# Patient Record
Sex: MALE | Race: WHITE | Employment: OTHER | URBAN - METROPOLITAN AREA
[De-identification: names, ages, dates, MRNs, and addresses within clinical notes are randomized per-mention and may not be internally consistent; named-entity substitution may affect disease eponyms.]

---

## 2021-08-01 ENCOUNTER — HOSPITAL ENCOUNTER (INPATIENT)
Age: 86
LOS: 9 days | Discharge: NURSING, REHAB OR LONG-TERM CARE | DRG: 056 | End: 2021-08-10
Attending: EMERGENCY MEDICINE | Admitting: INTERNAL MEDICINE
Payer: MEDICARE

## 2021-08-01 ENCOUNTER — APPOINTMENT (OUTPATIENT)
Dept: GENERAL RADIOLOGY | Age: 86
DRG: 056 | End: 2021-08-01
Attending: INTERNAL MEDICINE
Payer: MEDICARE

## 2021-08-01 ENCOUNTER — APPOINTMENT (OUTPATIENT)
Dept: GENERAL RADIOLOGY | Age: 86
DRG: 056 | End: 2021-08-01
Attending: EMERGENCY MEDICINE
Payer: MEDICARE

## 2021-08-01 ENCOUNTER — APPOINTMENT (OUTPATIENT)
Dept: CT IMAGING | Age: 86
DRG: 056 | End: 2021-08-01
Attending: EMERGENCY MEDICINE
Payer: MEDICARE

## 2021-08-01 DIAGNOSIS — J96.01 ACUTE RESPIRATORY FAILURE WITH HYPOXIA (HCC): Primary | ICD-10-CM

## 2021-08-01 DIAGNOSIS — R05.9 COUGH: ICD-10-CM

## 2021-08-01 DIAGNOSIS — R63.0 ANOREXIA: ICD-10-CM

## 2021-08-01 DIAGNOSIS — R25.1 TREMOR OF FACE AND HANDS: ICD-10-CM

## 2021-08-01 DIAGNOSIS — G21.0 NMS (NEUROLEPTIC MALIGNANT SYNDROME): ICD-10-CM

## 2021-08-01 DIAGNOSIS — R40.1 STUPOR: ICD-10-CM

## 2021-08-01 DIAGNOSIS — R41.82 ALTERED MENTAL STATUS, UNSPECIFIED ALTERED MENTAL STATUS TYPE: ICD-10-CM

## 2021-08-01 DIAGNOSIS — G20 ATYPICAL PARKINSONISM (HCC): ICD-10-CM

## 2021-08-01 PROBLEM — J96.91 RESPIRATORY FAILURE WITH HYPOXIA (HCC): Status: ACTIVE | Noted: 2021-08-01

## 2021-08-01 PROBLEM — G25.0 BENIGN ESSENTIAL TREMOR: Status: ACTIVE | Noted: 2018-01-30

## 2021-08-01 PROBLEM — Z86.73 HISTORY OF TIA (TRANSIENT ISCHEMIC ATTACK): Status: ACTIVE | Noted: 2017-08-28

## 2021-08-01 LAB
ALBUMIN SERPL-MCNC: 3.3 G/DL (ref 3.2–4.6)
ALBUMIN/GLOB SERPL: 1 {RATIO} (ref 1.2–3.5)
ALP SERPL-CCNC: 73 U/L (ref 50–136)
ALT SERPL-CCNC: 15 U/L (ref 12–65)
ANION GAP SERPL CALC-SCNC: 6 MMOL/L (ref 7–16)
ANION GAP SERPL CALC-SCNC: 7 MMOL/L (ref 7–16)
APPEARANCE UR: CLEAR
ARTERIAL PATENCY WRIST A: POSITIVE
ARTERIAL PATENCY WRIST A: POSITIVE
AST SERPL-CCNC: 83 U/L (ref 15–37)
BASE EXCESS BLD CALC-SCNC: 0.7 MMOL/L
BASE EXCESS BLD CALC-SCNC: 0.9 MMOL/L
BASOPHILS # BLD: 0 K/UL (ref 0–0.2)
BASOPHILS NFR BLD: 0 % (ref 0–2)
BDY SITE: ABNORMAL
BDY SITE: NORMAL
BILIRUB SERPL-MCNC: 0.9 MG/DL (ref 0.2–1.1)
BILIRUB UR QL: ABNORMAL
BUN SERPL-MCNC: 22 MG/DL (ref 8–23)
BUN SERPL-MCNC: 24 MG/DL (ref 8–23)
CALCIUM SERPL-MCNC: 8.5 MG/DL (ref 8.3–10.4)
CALCIUM SERPL-MCNC: 8.6 MG/DL (ref 8.3–10.4)
CHLORIDE SERPL-SCNC: 103 MMOL/L (ref 98–107)
CHLORIDE SERPL-SCNC: 105 MMOL/L (ref 98–107)
CO2 BLD-SCNC: 26 MMOL/L (ref 13–23)
CO2 SERPL-SCNC: 28 MMOL/L (ref 21–32)
CO2 SERPL-SCNC: 29 MMOL/L (ref 21–32)
COLOR UR: ABNORMAL
COVID-19 RAPID TEST, COVR: NOT DETECTED
CREAT SERPL-MCNC: 0.83 MG/DL (ref 0.8–1.5)
CREAT SERPL-MCNC: 0.9 MG/DL (ref 0.8–1.5)
DIFFERENTIAL METHOD BLD: ABNORMAL
EOSINOPHIL # BLD: 0 K/UL (ref 0–0.8)
EOSINOPHIL NFR BLD: 0 % (ref 0.5–7.8)
ERYTHROCYTE [DISTWIDTH] IN BLOOD BY AUTOMATED COUNT: 13.2 % (ref 11.9–14.6)
GAS FLOW.O2 O2 DELIVERY SYS: ABNORMAL L/MIN
GAS FLOW.O2 O2 DELIVERY SYS: NORMAL L/MIN
GLOBULIN SER CALC-MCNC: 3.4 G/DL (ref 2.3–3.5)
GLUCOSE SERPL-MCNC: 122 MG/DL (ref 65–100)
GLUCOSE SERPL-MCNC: 138 MG/DL (ref 65–100)
GLUCOSE UR STRIP.AUTO-MCNC: NEGATIVE MG/DL
HCO3 BLD-SCNC: 25.6 MMOL/L (ref 22–26)
HCO3 BLD-SCNC: 25.8 MMOL/L (ref 22–26)
HCT VFR BLD AUTO: 32.3 % (ref 41.1–50.3)
HGB BLD-MCNC: 11 G/DL (ref 13.6–17.2)
HGB UR QL STRIP: NEGATIVE
IMM GRANULOCYTES # BLD AUTO: 0 K/UL (ref 0–0.5)
IMM GRANULOCYTES NFR BLD AUTO: 1 % (ref 0–5)
INSPIRATION.DURATION SETTING TIME VENT: 0.9 SEC
KETONES UR QL STRIP.AUTO: 15 MG/DL
LACTATE SERPL-SCNC: 1.6 MMOL/L (ref 0.4–2)
LACTATE SERPL-SCNC: 1.6 MMOL/L (ref 0.4–2)
LEUKOCYTE ESTERASE UR QL STRIP.AUTO: NEGATIVE
LYMPHOCYTES # BLD: 0.6 K/UL (ref 0.5–4.6)
LYMPHOCYTES NFR BLD: 9 % (ref 13–44)
MAGNESIUM SERPL-MCNC: 2 MG/DL (ref 1.8–2.4)
MCH RBC QN AUTO: 32.4 PG (ref 26.1–32.9)
MCHC RBC AUTO-ENTMCNC: 34.1 G/DL (ref 31.4–35)
MCV RBC AUTO: 95.3 FL (ref 79.6–97.8)
MONOCYTES # BLD: 0.4 K/UL (ref 0.1–1.3)
MONOCYTES NFR BLD: 6 % (ref 4–12)
NEUTS SEG # BLD: 5.9 K/UL (ref 1.7–8.2)
NEUTS SEG NFR BLD: 85 % (ref 43–78)
NITRITE UR QL STRIP.AUTO: NEGATIVE
NRBC # BLD: 0 K/UL (ref 0–0.2)
O2/TOTAL GAS SETTING VFR VENT: 35 %
O2/TOTAL GAS SETTING VFR VENT: 60 %
PAW @ MEAN EXP FLOW ON VENT: 11 CMH2O
PCO2 BLD: 40.5 MMHG (ref 35–45)
PCO2 BLD: 42.5 MMHG (ref 35–45)
PEEP RESPIRATORY: 8 CMH2O
PEEP RESPIRATORY: 8 CMH2O
PH BLD: 7.39 [PH] (ref 7.35–7.45)
PH BLD: 7.41 [PH] (ref 7.35–7.45)
PH UR STRIP: 6.5 [PH] (ref 5–9)
PIP ISTAT,IPIP: 21
PLATELET # BLD AUTO: 219 K/UL (ref 150–450)
PMV BLD AUTO: 8.9 FL (ref 9.4–12.3)
PO2 BLD: 323 MMHG (ref 75–100)
PO2 BLD: 95 MMHG (ref 75–100)
POTASSIUM SERPL-SCNC: 3.8 MMOL/L (ref 3.5–5.1)
POTASSIUM SERPL-SCNC: 4.3 MMOL/L (ref 3.5–5.1)
PROCALCITONIN SERPL-MCNC: 0.06 NG/ML
PROT SERPL-MCNC: 6.7 G/DL (ref 6.3–8.2)
PROT UR STRIP-MCNC: NEGATIVE MG/DL
RBC # BLD AUTO: 3.39 M/UL (ref 4.23–5.6)
SAO2 % BLD: 97.5 % (ref 95–98)
SAO2 % BLD: 99.9 % (ref 95–98)
SARS-COV-2, COV2: NORMAL
SERVICE CMNT-IMP: ABNORMAL
SERVICE CMNT-IMP: ABNORMAL
SERVICE CMNT-IMP: NORMAL
SODIUM SERPL-SCNC: 139 MMOL/L (ref 138–145)
SODIUM SERPL-SCNC: 139 MMOL/L (ref 138–145)
SOURCE, COVRS: NORMAL
SP GR UR REFRACTOMETRY: 1.02 (ref 1–1.02)
SPECIMEN TYPE: ABNORMAL
SPECIMEN TYPE: NORMAL
TOTAL RESP. RATE, ITRR: 18
TROPONIN-HIGH SENSITIVITY: 39.2 PG/ML (ref 0–14)
TROPONIN-HIGH SENSITIVITY: 42.4 PG/ML (ref 0–14)
UROBILINOGEN UR QL STRIP.AUTO: 1 EU/DL (ref 0.2–1)
VENTILATION MODE VENT: ABNORMAL
VENTILATION MODE VENT: NORMAL
VT SETTING VENT: 450 ML
VT SETTING VENT: 450 ML
WBC # BLD AUTO: 7 K/UL (ref 4.3–11.1)

## 2021-08-01 PROCEDURE — 36600 WITHDRAWAL OF ARTERIAL BLOOD: CPT

## 2021-08-01 PROCEDURE — 83735 ASSAY OF MAGNESIUM: CPT

## 2021-08-01 PROCEDURE — 74011250636 HC RX REV CODE- 250/636: Performed by: NURSE PRACTITIONER

## 2021-08-01 PROCEDURE — 82803 BLOOD GASES ANY COMBINATION: CPT

## 2021-08-01 PROCEDURE — 74011250636 HC RX REV CODE- 250/636: Performed by: EMERGENCY MEDICINE

## 2021-08-01 PROCEDURE — 80053 COMPREHEN METABOLIC PANEL: CPT

## 2021-08-01 PROCEDURE — 84484 ASSAY OF TROPONIN QUANT: CPT

## 2021-08-01 PROCEDURE — 5A1945Z RESPIRATORY VENTILATION, 24-96 CONSECUTIVE HOURS: ICD-10-PCS | Performed by: INTERNAL MEDICINE

## 2021-08-01 PROCEDURE — 65610000001 HC ROOM ICU GENERAL

## 2021-08-01 PROCEDURE — 71045 X-RAY EXAM CHEST 1 VIEW: CPT

## 2021-08-01 PROCEDURE — 74011250636 HC RX REV CODE- 250/636: Performed by: INTERNAL MEDICINE

## 2021-08-01 PROCEDURE — 0DH67UZ INSERTION OF FEEDING DEVICE INTO STOMACH, VIA NATURAL OR ARTIFICIAL OPENING: ICD-10-PCS | Performed by: INTERNAL MEDICINE

## 2021-08-01 PROCEDURE — 51702 INSERT TEMP BLADDER CATH: CPT

## 2021-08-01 PROCEDURE — 83605 ASSAY OF LACTIC ACID: CPT

## 2021-08-01 PROCEDURE — 84145 PROCALCITONIN (PCT): CPT

## 2021-08-01 PROCEDURE — 87040 BLOOD CULTURE FOR BACTERIA: CPT

## 2021-08-01 PROCEDURE — 87635 SARS-COV-2 COVID-19 AMP PRB: CPT

## 2021-08-01 PROCEDURE — 74011000258 HC RX REV CODE- 258: Performed by: INTERNAL MEDICINE

## 2021-08-01 PROCEDURE — 94002 VENT MGMT INPAT INIT DAY: CPT

## 2021-08-01 PROCEDURE — 74018 RADEX ABDOMEN 1 VIEW: CPT

## 2021-08-01 PROCEDURE — 74011000250 HC RX REV CODE- 250: Performed by: NURSE PRACTITIONER

## 2021-08-01 PROCEDURE — 96374 THER/PROPH/DIAG INJ IV PUSH: CPT

## 2021-08-01 PROCEDURE — 99223 1ST HOSP IP/OBS HIGH 75: CPT | Performed by: INTERNAL MEDICINE

## 2021-08-01 PROCEDURE — 85025 COMPLETE CBC W/AUTO DIFF WBC: CPT

## 2021-08-01 PROCEDURE — 99285 EMERGENCY DEPT VISIT HI MDM: CPT

## 2021-08-01 PROCEDURE — 36415 COLL VENOUS BLD VENIPUNCTURE: CPT

## 2021-08-01 PROCEDURE — 70450 CT HEAD/BRAIN W/O DYE: CPT

## 2021-08-01 PROCEDURE — 81003 URINALYSIS AUTO W/O SCOPE: CPT

## 2021-08-01 PROCEDURE — 3E0G76Z INTRODUCTION OF NUTRITIONAL SUBSTANCE INTO UPPER GI, VIA NATURAL OR ARTIFICIAL OPENING: ICD-10-PCS | Performed by: INTERNAL MEDICINE

## 2021-08-01 RX ORDER — SODIUM CHLORIDE 0.9 % (FLUSH) 0.9 %
5-40 SYRINGE (ML) INJECTION AS NEEDED
Status: DISCONTINUED | OUTPATIENT
Start: 2021-08-01 | End: 2021-08-10 | Stop reason: HOSPADM

## 2021-08-01 RX ORDER — ACETAMINOPHEN 325 MG/1
650 TABLET ORAL
Status: DISCONTINUED | OUTPATIENT
Start: 2021-08-01 | End: 2021-08-10 | Stop reason: HOSPADM

## 2021-08-01 RX ORDER — ONDANSETRON 2 MG/ML
4 INJECTION INTRAMUSCULAR; INTRAVENOUS
Status: DISCONTINUED | OUTPATIENT
Start: 2021-08-01 | End: 2021-08-10 | Stop reason: HOSPADM

## 2021-08-01 RX ORDER — SODIUM CHLORIDE 0.9 % (FLUSH) 0.9 %
5-40 SYRINGE (ML) INJECTION EVERY 8 HOURS
Status: DISCONTINUED | OUTPATIENT
Start: 2021-08-01 | End: 2021-08-10 | Stop reason: HOSPADM

## 2021-08-01 RX ORDER — ACETAMINOPHEN 650 MG/1
650 SUPPOSITORY RECTAL
Status: DISCONTINUED | OUTPATIENT
Start: 2021-08-01 | End: 2021-08-10 | Stop reason: HOSPADM

## 2021-08-01 RX ORDER — ONDANSETRON 4 MG/1
4 TABLET, ORALLY DISINTEGRATING ORAL
Status: DISCONTINUED | OUTPATIENT
Start: 2021-08-01 | End: 2021-08-10 | Stop reason: HOSPADM

## 2021-08-01 RX ORDER — POLYETHYLENE GLYCOL 3350 17 G/17G
17 POWDER, FOR SOLUTION ORAL DAILY PRN
Status: DISCONTINUED | OUTPATIENT
Start: 2021-08-01 | End: 2021-08-10 | Stop reason: HOSPADM

## 2021-08-01 RX ORDER — PROPOFOL 10 MG/ML
0-50 INJECTION, EMULSION INTRAVENOUS
Status: DISCONTINUED | OUTPATIENT
Start: 2021-08-01 | End: 2021-08-02

## 2021-08-01 RX ORDER — ENOXAPARIN SODIUM 100 MG/ML
40 INJECTION SUBCUTANEOUS DAILY
Status: DISCONTINUED | OUTPATIENT
Start: 2021-08-02 | End: 2021-08-10 | Stop reason: HOSPADM

## 2021-08-01 RX ORDER — FENTANYL CITRATE-0.9 % NACL/PF 25 MCG/ML
0-200 PLASTIC BAG, INJECTION (ML) INJECTION
Status: DISCONTINUED | OUTPATIENT
Start: 2021-08-01 | End: 2021-08-02

## 2021-08-01 RX ADMIN — PROPOFOL 5 MCG/KG/MIN: 10 INJECTION, EMULSION INTRAVENOUS at 11:21

## 2021-08-01 RX ADMIN — CEFTRIAXONE 1 G: 1 INJECTION, POWDER, FOR SOLUTION INTRAMUSCULAR; INTRAVENOUS at 15:20

## 2021-08-01 RX ADMIN — Medication 10 ML: at 21:08

## 2021-08-01 RX ADMIN — Medication 10 ML: at 16:02

## 2021-08-01 RX ADMIN — AZITHROMYCIN MONOHYDRATE 500 MG: 500 INJECTION, POWDER, LYOPHILIZED, FOR SOLUTION INTRAVENOUS at 16:01

## 2021-08-01 RX ADMIN — PROPOFOL 10 MCG/KG/MIN: 10 INJECTION, EMULSION INTRAVENOUS at 12:38

## 2021-08-01 RX ADMIN — FAMOTIDINE 20 MG: 10 INJECTION INTRAVENOUS at 15:25

## 2021-08-01 RX ADMIN — FENTANYL CITRATE 75 MCG/HR: 0.05 INJECTION, SOLUTION INTRAMUSCULAR; INTRAVENOUS at 13:56

## 2021-08-01 RX ADMIN — FENTANYL CITRATE 50 MCG/HR: 0.05 INJECTION, SOLUTION INTRAMUSCULAR; INTRAVENOUS at 13:29

## 2021-08-01 RX ADMIN — FAMOTIDINE 20 MG: 10 INJECTION INTRAVENOUS at 21:08

## 2021-08-01 NOTE — PROGRESS NOTES
TRANSFER - IN REPORT:    Verbal report received from Jennifer RN (name) on Ricky Echeverria  being received from ED BED 7 (unit) for routine progression of care      Report consisted of patients Situation, Background, Assessment and   Recommendations(SBAR). Information from the following report(s) SBAR, Kardex, ED Summary, Intake/Output, MAR, Recent Results, Med Rec Status, Cardiac Rhythm NSR, Alarm Parameters  and Quality Measures was reviewed with the receiving nurse. Opportunity for questions and clarification was provided. Assessment completed upon patients arrival to unit and care assumed.

## 2021-08-01 NOTE — ED PROVIDER NOTES
Patient is an 71-year-old male who is currently at [facility] with Parkinson's dementia. He was admitted to the facility on 7/27/2021. I spoke with the physician at the facility this morning who saw him yesterday and said he was not conversational but got a call from the staff that he was increasingly combative and agitated this morning. He did not get to see the patient prior to speaking with me. According to EMS when they arrived the patient was having some gurgling respirations and decreased oxygen saturation. This continued to decline and they intubated the patient in route.            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    Murmur 1/18/2013    Nonspecific (abnormal) findings on radiological and other examination of other intrathoracic organs 9/4/2012    Osteopenia 7/11/2013    Peptic ulcer, unspecified site, unspecified as acute or chronic, without mention of hemorrhage, perforation, or obstruction 9/4/2012    Pulmonary infiltrate 1/18/2013    Tremor 7/11/2013    Vertigo 7/11/2013    Vitamin D deficiency 7/11/2013    Weight loss 1/18/2013       Past Surgical History:   Procedure Laterality Date    HX BACK SURGERY  1990    herniated disc    HX CHOLECYSTECTOMY  2000    HX GASTRECTOMY  1960    due to DU    HX HEENT  1994    sinus    HX HERNIA REPAIR  1992    right inguinal    HX ORTHOPAEDIC  rt shoulder    1999         Family History:   Problem Relation Age of Onset    Arthritis-osteo Mother     Heart Disease Mother     Alzheimer Mother     Heart Disease Father     Herminia Gentle Brother     Other Other         General Fam Hx. of aneurysms    Heart Disease Sister         Valve replacement       Social History     Socioeconomic History    Marital status:      Spouse name: Not on file    Number of children: Not on file    Years of education: Not on file    Highest education level: Not on file   Occupational History    Not on file   Tobacco Use    Smoking status: Never Smoker    Smokeless tobacco: Never Used   Substance and Sexual Activity    Alcohol use: No     Alcohol/week: 0.0 standard drinks    Drug use: No    Sexual activity: Not on file   Other Topics Concern    Not on file   Social History Narrative    Not on file     Social Determinants of Health     Financial Resource Strain:     Difficulty of Paying Living Expenses:    Food Insecurity:     Worried About Running Out of Food in the Last Year:     [address] in the Last Year:    Transportation Needs:     Lack of Transportation (Medical):  Lack of Transportation (Non-Medical):    Physical Activity:     Days of Exercise per Week:     Minutes of Exercise per Session:    Stress:     Feeling of Stress :    Social Connections:     Frequency of Communication with Friends and Family:     Frequency of Social Gatherings with Friends and Family:     Attends Sabianism Services:     Active Member of Clubs or Organizations:     Attends Club or Organization Meetings:     Marital Status:    Intimate Partner Violence:     Fear of Current or Ex-Partner:     Emotionally Abused:     Physically Abused:     Sexually Abused:           ALLERGIES: Codeine and Gadolinium-containing contrast media    Review of Systems   Unable to perform ROS: Intubated       Vitals:    08/01/21 1421 08/01/21 1435 08/01/21 1437 08/01/21 1448   BP:  (!) 104/58 106/61 (!) 99/56   Pulse:  71 70 69   Resp:  16 16 15   Temp:       SpO2:  99% 100% 100%   Weight:       Height: 5' 10\" (1.778 m)               Physical Exam     GENERAL:The patient has Body mass index is 17.94 kg/m². Well-hydrated. Intubated  VITAL SIGNS: Heart rate, blood pressure, respiratory rate reviewed as recorded in  nurse's notes  EYES: Pupils reactive. Extraocular motion intact. No conjunctival redness or drainage. EARS: No external masses or lesions. NOSE: No nasal drainage or epistaxis. MOUTH/THROAT: ET tube in place  NECK: Supple, no meningeal signs. Trachea midline. No masses or thyromegaly. LUNGS: No spontaneous respirations or breathing over the vent. Breath sounds noted bilaterally. Scattered rhonchi present. RECTAL: Bright red blood rectally  CHEST: No deformity  CARDIOVASCULAR: Regular rate and rhythm  ABDOMEN: Soft without tenderness. No palpable masses or organomegaly. No  peritoneal signs. No rigidity. EXTREMITIES: No clubbing or cyanosis. No joint swelling. Normal muscle tone. No  restricted range of motion appreciated. NEUROLOGIC: Paralyzed with vecuronium. SKIN: No rash or petechiae. Good skin turgor palpated. MDM  Number of Diagnoses or Management Options  Diagnosis management comments: acute exacerbation asthma, COPD, CHF,     Bronchitis, aspiration pneumonia, pneumonia,    PE, rib fracture, rib dysfunction, pleurisy, pneumothorax, aspiration of foreign body       Amount and/or Complexity of Data Reviewed  Clinical lab tests: ordered and reviewed  Tests in the radiology section of CPT®: ordered and reviewed  Tests in the medicine section of CPT®: reviewed and ordered  Review and summarize past medical records: yes  Independent visualization of images, tracings, or specimens: yes      ED Course as of Aug 01 1545   Sun Aug 01, 2021   1151 Case discussed Dr. Hillary Young the intensivist and he will come and see the patient here in the emergency department.     [KH]      ED Course User Index  [KH] Karyle Maine, DO       EKG    Date/Time: 8/1/2021 11:09 AM  Performed by: Karyle Maine, DO  Authorized by: Karyle Maine, DO     ECG reviewed by ED Physician in the absence of a cardiologist: yes    Comments:      Paced rhythm at a rate of 77 bpm.

## 2021-08-01 NOTE — PROGRESS NOTES
INITIAL SPIRITUAL ASSESSMENT     NOTED:    [employer]    SPOUSE -  SRIDEVI    LIVES IN [facility]    FROM [facility]    FULL CODE    NO ACP ON FILE    ARRIVED INTUBATED      WILL ASSESS HOW WE CAN BEST SERVE THIS FAMILY

## 2021-08-01 NOTE — H&P
[facility] H&P :  8/1/2021    Date of Admission:  8/1/2021      Subjective:   Patient is a 81 y.o.  male presents with s/p treatment for MAC, bronchiectasis, GERD, glaucoma, TIAs, HLD, HTN, s/p pacemaker (2019), PUD, pulmonary nodule, back surgery, PCI x 3 (on xarelto), Doris and Yahr stage 3 parkinsonism (seen by Dr Regi Bernabe), tremor, and partial gastrectomy. Pt is currently in the ED ventilated and sedated. Pt arrived via EMS from [facility] c/o SOB. He began to have decrease LOC and snoring respirations and arrived intubated. Per [facility] employee patient has not been eating/drinking x 2 days and had more AMS than baseline. EMS admisitered etomidate, succ, versed x 2, fentanyl, and vec. Wife at bedside, questions answered. Pt was recently admitted to [facility] r/t worsening aggression, hallucinations, and confusion. Prior to these changes he lived at home with spouse. She denies any recent sick contacts. He did receive the phizer covid 19 vaccine series. She feels he has deteriorated since this. Head CT negative for acute changes. CXR shows some scarring, but no infiltrates suggestive for PNA. LA 1.6. Rapid COVID negative. SARS COVID pending. He is a DNR. Plan to admit to ICU for further management and care.        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    Murmur 1/18/2013    Nonspecific (abnormal) findings on radiological and other examination of other intrathoracic organs 9/4/2012    Osteopenia 7/11/2013    Peptic ulcer, unspecified site, unspecified as acute or chronic, without mention of hemorrhage, perforation, or obstruction 9/4/2012    Pulmonary infiltrate 1/18/2013    Tremor 7/11/2013    Vertigo 7/11/2013    Vitamin D deficiency 7/11/2013    Weight loss 1/18/2013      Past Surgical History:   Procedure Laterality Date    HX BACK SURGERY  1990    herniated disc    HX CHOLECYSTECTOMY  2000    HX GASTRECTOMY  1960    due to DU    HX HEENT  1994    sinus    HX HERNIA REPAIR  1992    right inguinal    HX ORTHOPAEDIC  rt shoulder    1999        Social History     Tobacco Use    Smoking status: Never Smoker    Smokeless tobacco: Never Used   Substance Use Topics    Alcohol use: No     Alcohol/week: 0.0 standard drinks      Family History   Problem Relation Age of Onset   Gibran Reaper Arthritis-osteo Mother     Heart Disease Mother     Alzheimer Mother     Heart Disease Father     Arthritis-osteo Brother     Other Other         General Fam Hx. of aneurysms    Heart Disease Sister         Valve replacement      Allergies   Allergen Reactions    Codeine Nausea and Vomiting    Gadolinium-Containing Contrast Media Nausea and Vomiting      Prior to Admission Medications   Prescriptions Last Dose Informant Patient Reported? Taking? CYANOCOBALAMIN, VITAMIN B-12, (VITAMIN B-12 PO)   Yes No   Sig: Take  by mouth every Monday, Wednesday, Friday. Ferrous Fumarate 325 mg (106 mg iron) tab   Yes No   Sig: Take one Sundays and Wednesdays by mouth   HYDROcodone-acetaminophen (NORCO) 5-325 mg per tablet   Yes No   Sig: Take 1 Tab by mouth every six (6) hours as needed. aspirin delayed-release 81 mg tablet   Yes No   Sig: Take  by mouth daily. atorvastatin (LIPITOR) 80 mg tablet   No No   Sig: Take 1 Tab by mouth daily. azithromycin (ZITHROMAX) 500 mg tab   Yes No   Sig: Take 1,500 mg by mouth.  Three times a week Monday, Wednesday, Friday   celecoxib (CELEBREX) 100 mg capsule   No No   Sig: Take 1 to 2 caps daily as needed for pain   cholecalciferol, vitamin D3, (VITAMIN D3) 2,000 unit tab   Yes No   Sig: Take 2 daily   clopidogrel (PLAVIX) 75 mg tablet   No No   Sig: Take 1 Tab by mouth daily. co-enzyme Q-10 (COQ-10) 100 mg capsule   Yes No   Sig: Take 1-2 capsules daily as needed for muscle cramps from cholesterol medicine   ethambutol (MYAMBUTOL) 400 mg tablet   Yes No   Sig: Take 1,600 mg by mouth. Three times a week   famotidine (PEPCID) 40 mg tablet   Yes No   Sig: Take 40 mg by mouth nightly. fluticasone (FLONASE) 50 mcg/actuation nasal spray   No No   Sig: PLACE 2 SPRAYS IN BOTH NOSTRILS DAILY. latanoprost (XALATAN) 0.005 % ophthalmic solution   Yes No   Sig: Administer 1 Drop to both eyes nightly. lisinopril (PRINIVIL, ZESTRIL) 5 mg tablet   No No   Sig: Take 1 Tab by mouth daily. meclizine (ANTIVERT) 12.5 mg tablet   No No   Sig: Take 1/2 to 1 tablet twice daily as needed for vertigo   mirtazapine (REMERON) 7.5 mg tablet   No No   Sig: TAKE 1/2-1 TABLET BY MOUTH 2 HOURS BEFORE BEDTIME   nitroglycerin (NITROSTAT) 0.4 mg SL tablet   Yes No   Sig: by SubLINGual route every five (5) minutes as needed. ondansetron hcl (ZOFRAN) 4 mg tablet   No No   Sig: TAKE 1 TABLET BY MOUTH EVERY 8 HOURS AS NEEDED FOR NAUSEA   propranolol LA (INDERAL LA) 60 mg SR capsule   Yes No   Sig: Take  by mouth daily. rifampin (RIFADIN) 300 mg capsule   Yes No   Sig: Take 600 mg by mouth. Three times a week   tadalafil (CIALIS) 20 mg tablet   No No   Sig: Take 1 Tab by mouth daily as needed.       Facility-Administered Medications: None       MEDS SCHEDULED:    Current Facility-Administered Medications   Medication Dose Route Frequency    propofoL (DIPRIVAN) 10 mg/mL injection  0-50 mcg/kg/min IntraVENous TITRATE    sodium chloride (NS) flush 5-40 mL  5-40 mL IntraVENous Q8H    sodium chloride (NS) flush 5-40 mL  5-40 mL IntraVENous PRN    acetaminophen (TYLENOL) tablet 650 mg  650 mg Oral Q6H PRN    Or    acetaminophen (TYLENOL) suppository 650 mg  650 mg Rectal Q6H PRN    polyethylene glycol (MIRALAX) packet 17 g  17 g Oral DAILY PRN    ondansetron (ZOFRAN ODT) tablet 4 mg  4 mg Oral Q8H PRN    Or    ondansetron (ZOFRAN) injection 4 mg  4 mg IntraVENous Q6H PRN    [START ON 8/2/2021] enoxaparin (LOVENOX) injection 40 mg  40 mg SubCUTAneous DAILY    famotidine (PF) (PEPCID) 20 mg in 0.9% sodium chloride 10 mL injection  20 mg IntraVENous Q12H    fentaNYL in normal saline (pf) 25 mcg/mL infusion  0-200 mcg/hr IntraVENous TITRATE    cefTRIAXone (ROCEPHIN) 1 g in 0.9% sodium chloride (MBP/ADV) 50 mL MBP  1 g IntraVENous Q12H    azithromycin (ZITHROMAX) 500 mg in 0.9% sodium chloride 250 mL (VIAL-MATE)  500 mg IntraVENous Q24H     Current Outpatient Medications   Medication Sig    ethambutol (MYAMBUTOL) 400 mg tablet Take 1,600 mg by mouth. Three times a week    HYDROcodone-acetaminophen (NORCO) 5-325 mg per tablet Take 1 Tab by mouth every six (6) hours as needed.  rifampin (RIFADIN) 300 mg capsule Take 600 mg by mouth. Three times a week    azithromycin (ZITHROMAX) 500 mg tab Take 1,500 mg by mouth. Three times a week Monday, Wednesday, Friday    ondansetron hcl (ZOFRAN) 4 mg tablet TAKE 1 TABLET BY MOUTH EVERY 8 HOURS AS NEEDED FOR NAUSEA    tadalafil (CIALIS) 20 mg tablet Take 1 Tab by mouth daily as needed.  famotidine (PEPCID) 40 mg tablet Take 40 mg by mouth nightly.  clopidogrel (PLAVIX) 75 mg tablet Take 1 Tab by mouth daily.  celecoxib (CELEBREX) 100 mg capsule Take 1 to 2 caps daily as needed for pain    atorvastatin (LIPITOR) 80 mg tablet Take 1 Tab by mouth daily.  mirtazapine (REMERON) 7.5 mg tablet TAKE 1/2-1 TABLET BY MOUTH 2 HOURS BEFORE BEDTIME    lisinopril (PRINIVIL, ZESTRIL) 5 mg tablet Take 1 Tab by mouth daily.     meclizine (ANTIVERT) 12.5 mg tablet Take 1/2 to 1 tablet twice daily as needed for vertigo    cholecalciferol, vitamin D3, (VITAMIN D3) 2,000 unit tab Take 2 daily    Ferrous Fumarate 325 mg (106 mg iron) tab Take one Sundays and Wednesdays by mouth    fluticasone (FLONASE) 50 mcg/actuation nasal spray PLACE 2 SPRAYS IN BOTH NOSTRILS DAILY.  propranolol LA (INDERAL LA) 60 mg SR capsule Take  by mouth daily.  aspirin delayed-release 81 mg tablet Take  by mouth daily.  co-enzyme Q-10 (COQ-10) 100 mg capsule Take 1-2 capsules daily as needed for muscle cramps from cholesterol medicine    nitroglycerin (NITROSTAT) 0.4 mg SL tablet by SubLINGual route every five (5) minutes as needed.  latanoprost (XALATAN) 0.005 % ophthalmic solution Administer 1 Drop to both eyes nightly.  CYANOCOBALAMIN, VITAMIN B-12, (VITAMIN B-12 PO) Take  by mouth every Monday, Wednesday, Friday. Review of Systems  Review of systems not obtained due to patient factors. Objective:     Vitals:    08/01/21 1231 08/01/21 1247 08/01/21 1301 08/01/21 1318   BP: (!) 97/56 (!) 94/55 (!) 157/80 (!) 157/80   Pulse: 65 64 71 71   Resp: 18 17     Temp:       SpO2: 100% 100% 100% 100%   Weight:       Height:         No intake/output data recorded. No intake/output data recorded. PHYSICAL EXAM     Visit Vitals  BP (!) 157/80   Pulse 71   Temp 98.1 °F (36.7 °C)   Resp 17   Ht 5' 10\" (1.778 m)   Wt 125 lb (56.7 kg)   SpO2 100%   BMI 17.94 kg/m²     Physical Exam  Constitutional:       Appearance: He is underweight. Interventions: He is sedated and intubated. HENT:      Head: Normocephalic and atraumatic. Nose: Nose normal.      Mouth/Throat:      Lips: Pink. Mouth: Mucous membranes are moist.      Comments: Lower partial  Eyes:      Pupils: Pupils are equal, round, and reactive to light. Right eye: Pupil is sluggish. Left eye: Pupil is sluggish. Comments: 1 mm bilaterally, sluggish   Cardiovascular:      Rate and Rhythm: Normal rate and regular rhythm. Pulses: Normal pulses. Heart sounds: S1 normal and S2 normal. Murmur heard. Pulmonary:      Effort: Pulmonary effort is normal. He is intubated. Breath sounds: Normal breath sounds. Abdominal:      General: Abdomen is flat. Bowel sounds are decreased. Palpations: Abdomen is soft. Musculoskeletal:      Right lower leg: No edema. Left lower leg: No edema. Skin:     General: Skin is warm and dry. Capillary Refill: Capillary refill takes 2 to 3 seconds. Neurological:      Mental Status: He is unresponsive. Motor: Tremor present. Comments: Sedated, noted tremor of bilateral upper extremities   Psychiatric:      Comments: LULU         CHEST X-RAYS:  8/1/2021        CT head:   8/1/2021  IMPRESSION  1. No acute intracranial abnormality. 2. 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         CULTURES:   None    LABS    Recent Labs     08/01/21  1048   WBC 7.0   HGB 11.0*   HCT 32.3*        Recent Labs     08/01/21  1048      K 3.8      *   CO2 29   BUN 22   CREA 0.90   MG 2.0     ABG:    Lab Results   Component Value Date/Time    PHI 7.39 08/01/2021 11:49 AM    PCO2I 42.5 08/01/2021 11:49 AM    PO2I 323 (H) 08/01/2021 11:49 AM    HCO3I 25.8 08/01/2021 11:49 AM    FIO2I 60 08/01/2021 11:49 AM           Assessment:     Hospital Problems  Date Reviewed: 5/23/2016        Codes Class Noted POA    * (Principal) Respiratory failure with hypoxia ([facility] ICD-10-CM: J96.91  ICD-9-CM: 518.81  8/1/2021 Unknown        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  ICD-9-CM: 332.0  6/24/2021 Yes        Tremor ICD-10-CM: R25.1  ICD-9-CM: 781.0  7/11/2013 Yes        Murmur ICD-10-CM: R01.1  ICD-9-CM: 785.2  1/18/2013 Yes        Monoclonal paraproteinemia ICD-10-CM: D47.2  ICD-9-CM: 273.1  9/4/2012 Yes        GERD (gastroesophageal reflux disease) ICD-10-CM: K21.9  ICD-9-CM: 530.81  9/4/2012 Yes        PUD (peptic ulcer disease) ICD-10-CM: K27.9  ICD-9-CM: 533.90  9/4/2012 Yes                Plan:   --admit to ICU  --vent support   --rapid COVID 19 negative, SARS pending  --repeat ABG in AM  --CXR in AM  --fentanyl and propfol for sedation at present  --Pepcid PUD   --Lovenox 40 mg daily for DVT prophylaxis   --consult neurology to assist with atypical parkinsons  --DNR         Pritesh Fagan MD  8/1/2021 12:40 PM    More than 50% of time documented was spent in face-to-face contact with the patient and in the care of the patient on the floor/unit where the patient is located. Lungs:  Intubated, CTA B, no w/r/r  Heart:  RRR with no Murmur/Rubs/Gallops    Additional Comments:    Patient with baseline Parkinson's with 2 months of progressive dementia, less responsive and interactive at home. Not eating much in the past few weeks. Wife  was tried on several different medications at home without improvement. Family eventually decided to have him admitted to [facility] to see if any additional treatments would help and to give his wife a break from caring for him. Today was brought in to ER due to being less responsive and was intubated by EMS en route. Described as increased SOB but sounds like he truly had upper respiratory obstruction brought on by his worsening mental status. Labs are unremarkable. CXR with R sided infiltrate, wife states was recently treated for PNA, unsure if these changes are acute or not. Will cont vent support, minimize sedation, start abx to cover possible PNA. Will likely have Neuro evaluate him tomorrow as well. I have spoken with and examined the patient. I agree with the above assessment and plan as documented.     Pritesh Fagan MD

## 2021-08-01 NOTE — CONSULTS
Comprehensive Nutrition Assessment    Type and Reason for Visit: Initial, Consult  Tube Feeding Management (Pulmonary)    Nutrition Recommendations/Plan:   Enteral Nutrition:  Initiate Vital AF via NGT at 15 ml/hour, progress by 10 ml/4 hours to goal rate of 55ml/hour. Water flush 75 ml every 4 hours. At goal will provide 1452 kcal (100% estimated calorie needs), 91 grams protein (107% estimated protein needs) and 1431ml free fluid (~1ml/kcal) calculations based on 22 hours infusion. Vitamin and Mineral Supplement Therapy:  Electrolyte management replacement protocol implemented. Labs:   EN labs: BMP daily active times 3 days, Mg and Phos MWF added. Malnutrition Assessment:  Malnutrition Status: Insufficient data    Nutrition Assessment:   Nutrition History: Reported not to be eating or drinking at [facility] times 2 days. Review of IM office records indicate ongoing struggles with intake and weight since January. Nutrition Background: PMH remarkable for bronchiectasis, GERD, TIAs, HLD, HTN, tremor, partial gastrectomy. Admitted with respiratory failure w hypoxia, intubated by EMS, atypical parkinsonism. Daily Update:  Pt recently admitted to [facility] due to worsening aggression, hallucinations and confusion. He is intubated and sedated at this time. Abdominal Status (last documented):  Last BM unknown . Pertinent Medications: zithromax, rocephin, pepcid, fentanyl, diprivan stopped at 1244  Pertinent Labs:   Lab Results   Component Value Date/Time    Sodium 139 08/01/2021 10:48 AM    Potassium 3.8 08/01/2021 10:48 AM    Chloride 103 08/01/2021 10:48 AM    CO2 29 08/01/2021 10:48 AM    Anion gap 7 08/01/2021 10:48 AM    Glucose 122 (H) 08/01/2021 10:48 AM    BUN 22 08/01/2021 10:48 AM    Creatinine 0.90 08/01/2021 10:48 AM    Calcium 8.5 08/01/2021 10:48 AM    Albumin 3.3 08/01/2021 10:48 AM    Magnesium 2.0 08/01/2021 10:48 AM     Nutrition Related Findings:   NG placed 8/1.  NFPE deferred as pt in ED awaiting ICU bed. Current Nutrition Therapies:  DIET NPO    Current Intake:   Average Meal Intake: NPO        Anthropometric Measures:  Height: 5' 10\" (177.8 cm)  Current Body Wt: 56.7 kg (125 lb), Weight source: Not specified  BMI: 17.9, Underweight (BMI less than 22) age over 72  Admission Body Weight: 125 lb (source not specified)  Ideal Body Weight (lbs) (Calculated): 166 lbs (75 kg), 75.3 %  Usual Body Wt:  (Wt range x 1 year per IM records 54 kg to 56.8 kg), Percent weight change:            Edema: No data recorded   Estimated Daily Nutrient Needs:  Energy (kcal/day): 9841-2534 (Kcal/kg (25-30), Weight Used: Admission)  Protein (g/day): 68-85 (1.2-1.5 g/kg) Weight Used: (Admission)  Fluid (ml/day):   (1 ml/kcal)    Nutrition Diagnosis:   · Inadequate oral intake related to impaired respiratory function as evidenced by NPO or clear liquid status due to medical condition (NGF to begin for primary needs, hx of poor po pta)    Nutrition Interventions:   Food and/or Nutrient Delivery: Start tube feeding     Coordination of Nutrition Care: Continue to monitor while inpatient    Goals: Active Goal: Tolerate goal rate TF within 3 days    Nutrition Monitoring and Evaluation:      Food/Nutrient Intake Outcomes: Enteral nutrition intake/tolerance  Physical Signs/Symptoms Outcomes: Biochemical data, GI status, Nutrition focused physical findings, Weight    Discharge Planning:     Too soon to determine    Howie Christina RD, LDN on 8/1/2021 at 2:39 PM  Contact: [phone]

## 2021-08-01 NOTE — PROGRESS NOTES
Pt admitted from ED     Placed on monitor     Dual skin assessment with Brenton RN    Skin C/D/I  Dry and flaky    Some minor rectal bleeding noted   Allevyn placed     New gown placed     CHG bath preformed    ICU consent signed and placed on chart     MD Alexa Joseph at bedside as well as RT     No command following from pt/ shaking of upper extremities noted     pupils equal and reactive     fent gtt @ 75    Will continue to monitor

## 2021-08-01 NOTE — ED NOTES
TRANSFER - OUT REPORT:    Verbal report given to [facility] RN  on Armand Skiff  being transferred to [facility] for routine progression of care       Report consisted of patients Situation, Background, Assessment and   Recommendations(SBAR). Information from the following report(s) SBAR was reviewed with the receiving nurse. Lines:   Peripheral IV 08/01/21 Right;Left Forearm (Active)   Site Assessment Clean, dry, & intact 08/01/21 1044   Phlebitis Assessment 0 08/01/21 1044   Infiltration Assessment 0 08/01/21 1044   Dressing Status Clean, dry, & intact 08/01/21 1044       Peripheral IV 08/01/21 Right Antecubital (Active)   Site Assessment Clean, dry, & intact 08/01/21 1044   Phlebitis Assessment 0 08/01/21 1044   Infiltration Assessment 0 08/01/21 1044   Dressing Status Clean, dry, & intact 08/01/21 1044        Opportunity for questions and clarification was provided.       Patient transported with:   Registered Nurse

## 2021-08-01 NOTE — ED TRIAGE NOTES
Pt arrived via [facility] from [facility] c/o shob. Pt began to have decreased LOC and snoring respirations. Pt arrived intubated. States that pt has not been eating or drinking X2 days and more AMS.  EMS gave:    Etomidate 20mg @ 1004  Succ 50mg @1005  ETT tube 7.0 placed @1006  versed 5mg given at 1012  Versed 5mg given @1018  Fentanyl 100mcg @1019  Vec 8mg @ 1021

## 2021-08-02 ENCOUNTER — APPOINTMENT (OUTPATIENT)
Dept: GENERAL RADIOLOGY | Age: 86
DRG: 056 | End: 2021-08-02
Attending: NURSE PRACTITIONER
Payer: MEDICARE

## 2021-08-02 LAB
ANION GAP SERPL CALC-SCNC: 5 MMOL/L (ref 7–16)
ARTERIAL PATENCY WRIST A: NORMAL
ATRIAL RATE: 83 BPM
BASE EXCESS BLD CALC-SCNC: 0.8 MMOL/L
BDY SITE: NORMAL
BUN SERPL-MCNC: 29 MG/DL (ref 8–23)
CALCIUM SERPL-MCNC: 8.2 MG/DL (ref 8.3–10.4)
CALCULATED P AXIS, ECG09: 84 DEGREES
CALCULATED R AXIS, ECG10: -73 DEGREES
CALCULATED T AXIS, ECG11: 79 DEGREES
CHLORIDE SERPL-SCNC: 107 MMOL/L (ref 98–107)
CK SERPL-CCNC: 822 U/L (ref 21–215)
CO2 SERPL-SCNC: 29 MMOL/L (ref 21–32)
CREAT SERPL-MCNC: 1.03 MG/DL (ref 0.8–1.5)
DIAGNOSIS, 93000: NORMAL
ERYTHROCYTE [DISTWIDTH] IN BLOOD BY AUTOMATED COUNT: 13.6 % (ref 11.9–14.6)
GAS FLOW.O2 O2 DELIVERY SYS: NORMAL L/MIN
GLUCOSE SERPL-MCNC: 178 MG/DL (ref 65–100)
HCO3 BLD-SCNC: 25.9 MMOL/L (ref 22–26)
HCT VFR BLD AUTO: 30.8 % (ref 41.1–50.3)
HGB BLD-MCNC: 10.4 G/DL (ref 13.6–17.2)
MAGNESIUM SERPL-MCNC: 2.1 MG/DL (ref 1.8–2.4)
MCH RBC QN AUTO: 32.7 PG (ref 26.1–32.9)
MCHC RBC AUTO-ENTMCNC: 33.8 G/DL (ref 31.4–35)
MCV RBC AUTO: 96.9 FL (ref 79.6–97.8)
MYOGLOBIN SERPL-MCNC: 377 NG/ML (ref 16–96)
NRBC # BLD: 0 K/UL (ref 0–0.2)
O2/TOTAL GAS SETTING VFR VENT: 35 %
P-R INTERVAL, ECG05: 222 MS
PCO2 BLD: 42.4 MMHG (ref 35–45)
PEEP RESPIRATORY: 8 CMH2O
PH BLD: 7.39 [PH] (ref 7.35–7.45)
PHOSPHATE SERPL-MCNC: 2.9 MG/DL (ref 2.3–3.7)
PIP ISTAT,IPIP: 20
PLATELET # BLD AUTO: 217 K/UL (ref 150–450)
PMV BLD AUTO: 9.1 FL (ref 9.4–12.3)
PO2 BLD: 95 MMHG (ref 75–100)
POTASSIUM SERPL-SCNC: 3.8 MMOL/L (ref 3.5–5.1)
Q-T INTERVAL, ECG07: 412 MS
QRS DURATION, ECG06: 144 MS
QTC CALCULATION (BEZET), ECG08: 484 MS
RBC # BLD AUTO: 3.18 M/UL (ref 4.23–5.6)
SAO2 % BLD: 97.3 % (ref 95–98)
SERVICE CMNT-IMP: NORMAL
SODIUM SERPL-SCNC: 141 MMOL/L (ref 136–145)
SPECIMEN TYPE: NORMAL
TSH SERPL DL<=0.005 MIU/L-ACNC: 7.77 UIU/ML (ref 0.36–3.74)
VENTILATION MODE VENT: NORMAL
VENTRICULAR RATE, ECG03: 83 BPM
VIT B12 SERPL-MCNC: 450 PG/ML (ref 193–986)
VT SETTING VENT: 450 ML
WBC # BLD AUTO: 7.7 K/UL (ref 4.3–11.1)

## 2021-08-02 PROCEDURE — 94002 VENT MGMT INPAT INIT DAY: CPT

## 2021-08-02 PROCEDURE — 85027 COMPLETE CBC AUTOMATED: CPT

## 2021-08-02 PROCEDURE — 74011250636 HC RX REV CODE- 250/636: Performed by: INTERNAL MEDICINE

## 2021-08-02 PROCEDURE — 74011250637 HC RX REV CODE- 250/637: Performed by: INTERNAL MEDICINE

## 2021-08-02 PROCEDURE — 82803 BLOOD GASES ANY COMBINATION: CPT

## 2021-08-02 PROCEDURE — APPNB45 APP NON BILLABLE 31-45 MINUTES: Performed by: NURSE PRACTITIONER

## 2021-08-02 PROCEDURE — 83874 ASSAY OF MYOGLOBIN: CPT

## 2021-08-02 PROCEDURE — 74011250637 HC RX REV CODE- 250/637: Performed by: PSYCHIATRY & NEUROLOGY

## 2021-08-02 PROCEDURE — 99223 1ST HOSP IP/OBS HIGH 75: CPT | Performed by: PSYCHIATRY & NEUROLOGY

## 2021-08-02 PROCEDURE — 80048 BASIC METABOLIC PNL TOTAL CA: CPT

## 2021-08-02 PROCEDURE — 83735 ASSAY OF MAGNESIUM: CPT

## 2021-08-02 PROCEDURE — 71045 X-RAY EXAM CHEST 1 VIEW: CPT

## 2021-08-02 PROCEDURE — 94003 VENT MGMT INPAT SUBQ DAY: CPT

## 2021-08-02 PROCEDURE — 99233 SBSQ HOSP IP/OBS HIGH 50: CPT | Performed by: INTERNAL MEDICINE

## 2021-08-02 PROCEDURE — 84443 ASSAY THYROID STIM HORMONE: CPT

## 2021-08-02 PROCEDURE — 74011000258 HC RX REV CODE- 258: Performed by: INTERNAL MEDICINE

## 2021-08-02 PROCEDURE — 36592 COLLECT BLOOD FROM PICC: CPT

## 2021-08-02 PROCEDURE — 65610000001 HC ROOM ICU GENERAL

## 2021-08-02 PROCEDURE — 82607 VITAMIN B-12: CPT

## 2021-08-02 PROCEDURE — 93005 ELECTROCARDIOGRAM TRACING: CPT | Performed by: INTERNAL MEDICINE

## 2021-08-02 PROCEDURE — 74011000250 HC RX REV CODE- 250: Performed by: INTERNAL MEDICINE

## 2021-08-02 PROCEDURE — 74011000250 HC RX REV CODE- 250: Performed by: NURSE PRACTITIONER

## 2021-08-02 PROCEDURE — 94760 N-INVAS EAR/PLS OXIMETRY 1: CPT

## 2021-08-02 PROCEDURE — 82550 ASSAY OF CK (CPK): CPT

## 2021-08-02 PROCEDURE — 74011250636 HC RX REV CODE- 250/636: Performed by: NURSE PRACTITIONER

## 2021-08-02 PROCEDURE — 84100 ASSAY OF PHOSPHORUS: CPT

## 2021-08-02 PROCEDURE — 36600 WITHDRAWAL OF ARTERIAL BLOOD: CPT

## 2021-08-02 RX ORDER — CARBIDOPA AND LEVODOPA 10; 100 MG/1; MG/1
1 TABLET ORAL
Status: DISCONTINUED | OUTPATIENT
Start: 2021-08-02 | End: 2021-08-10 | Stop reason: HOSPADM

## 2021-08-02 RX ORDER — SODIUM CHLORIDE 9 MG/ML
125 INJECTION, SOLUTION INTRAVENOUS CONTINUOUS
Status: DISPENSED | OUTPATIENT
Start: 2021-08-02 | End: 2021-08-03

## 2021-08-02 RX ORDER — LORAZEPAM 2 MG/ML
INJECTION INTRAMUSCULAR
Status: ACTIVE
Start: 2021-08-02 | End: 2021-08-03

## 2021-08-02 RX ORDER — LORAZEPAM 2 MG/ML
2 INJECTION INTRAMUSCULAR
Status: DISCONTINUED | OUTPATIENT
Start: 2021-08-02 | End: 2021-08-03

## 2021-08-02 RX ORDER — SODIUM CHLORIDE 9 MG/ML
125 INJECTION, SOLUTION INTRAVENOUS CONTINUOUS
Status: DISCONTINUED | OUTPATIENT
Start: 2021-08-02 | End: 2021-08-02

## 2021-08-02 RX ORDER — CARBIDOPA AND LEVODOPA 25; 100 MG/1; MG/1
1 TABLET ORAL 3 TIMES DAILY
Status: DISCONTINUED | OUTPATIENT
Start: 2021-08-02 | End: 2021-08-02

## 2021-08-02 RX ORDER — BROMOCRIPTINE MESYLATE 2.5 MG/1
2.5 TABLET ORAL 3 TIMES DAILY
Status: DISCONTINUED | OUTPATIENT
Start: 2021-08-02 | End: 2021-08-06

## 2021-08-02 RX ORDER — BROMOCRIPTINE MESYLATE 2.5 MG/1
2.5 TABLET ORAL 3 TIMES DAILY
Status: DISCONTINUED | OUTPATIENT
Start: 2021-08-02 | End: 2021-08-02

## 2021-08-02 RX ORDER — LORAZEPAM 2 MG/ML
2 INJECTION INTRAMUSCULAR ONCE
Status: COMPLETED | OUTPATIENT
Start: 2021-08-02 | End: 2021-08-02

## 2021-08-02 RX ORDER — CARBIDOPA AND LEVODOPA 10; 100 MG/1; MG/1
1 TABLET ORAL
Status: DISCONTINUED | OUTPATIENT
Start: 2021-08-02 | End: 2021-08-02

## 2021-08-02 RX ORDER — DEXMEDETOMIDINE HYDROCHLORIDE 4 UG/ML
.1-1.5 INJECTION, SOLUTION INTRAVENOUS
Status: DISCONTINUED | OUTPATIENT
Start: 2021-08-02 | End: 2021-08-02

## 2021-08-02 RX ORDER — CARBIDOPA AND LEVODOPA 25; 100 MG/1; MG/1
1 TABLET ORAL 3 TIMES DAILY
Status: DISCONTINUED | OUTPATIENT
Start: 2021-08-02 | End: 2021-08-06

## 2021-08-02 RX ADMIN — Medication 10 ML: at 21:30

## 2021-08-02 RX ADMIN — DEXMEDETOMIDINE HYDROCHLORIDE 0.4 MCG/KG/HR: 100 INJECTION, SOLUTION INTRAVENOUS at 13:23

## 2021-08-02 RX ADMIN — SODIUM CHLORIDE 125 ML/HR: 900 INJECTION, SOLUTION INTRAVENOUS at 20:14

## 2021-08-02 RX ADMIN — ENOXAPARIN SODIUM 40 MG: 40 INJECTION SUBCUTANEOUS at 09:20

## 2021-08-02 RX ADMIN — CARBIDOPA AND LEVODOPA 1 TABLET: 10; 100 TABLET ORAL at 21:30

## 2021-08-02 RX ADMIN — CARBIDOPA AND LEVODOPA 1 TABLET: 25; 100 TABLET ORAL at 16:46

## 2021-08-02 RX ADMIN — LORAZEPAM 2 MG: 2 INJECTION INTRAMUSCULAR; INTRAVENOUS at 14:59

## 2021-08-02 RX ADMIN — AZITHROMYCIN MONOHYDRATE 500 MG: 500 INJECTION, POWDER, LYOPHILIZED, FOR SOLUTION INTRAVENOUS at 15:17

## 2021-08-02 RX ADMIN — FAMOTIDINE 20 MG: 10 INJECTION INTRAVENOUS at 09:20

## 2021-08-02 RX ADMIN — CEFTRIAXONE 1 G: 1 INJECTION, POWDER, FOR SOLUTION INTRAMUSCULAR; INTRAVENOUS at 01:53

## 2021-08-02 RX ADMIN — Medication 10 ML: at 15:09

## 2021-08-02 RX ADMIN — LORAZEPAM 2 MG: 2 INJECTION INTRAMUSCULAR; INTRAVENOUS at 20:29

## 2021-08-02 RX ADMIN — SODIUM CHLORIDE 500 ML: 900 INJECTION, SOLUTION INTRAVENOUS at 01:45

## 2021-08-02 RX ADMIN — BROMOCRIPTINE MESYLATE 2.5 MG: 2.5 TABLET ORAL at 21:30

## 2021-08-02 RX ADMIN — SODIUM CHLORIDE 125 ML/HR: 900 INJECTION, SOLUTION INTRAVENOUS at 17:45

## 2021-08-02 RX ADMIN — CARBIDOPA AND LEVODOPA 1 TABLET: 25; 100 TABLET ORAL at 21:30

## 2021-08-02 RX ADMIN — Medication 10 ML: at 05:08

## 2021-08-02 RX ADMIN — CEFTRIAXONE 1 G: 1 INJECTION, POWDER, FOR SOLUTION INTRAMUSCULAR; INTRAVENOUS at 14:29

## 2021-08-02 RX ADMIN — SODIUM CHLORIDE 1000 ML: 900 INJECTION, SOLUTION INTRAVENOUS at 15:35

## 2021-08-02 NOTE — PROGRESS NOTES
This is a follow-up visit to the patient, providing a spiritual presence, emotional support and prayer. The patient appears to be resting.     Marlen Yanes, [address] [facility] [place][note 2], [facility]

## 2021-08-02 NOTE — PROGRESS NOTES
BSN, CM met with pt and pt intubated and unable to complete assessment. Contacted spouse, Debra Pickering, via phone. Spouse verified demographic information. PCP verified as Dr. Anastasia Piedra and pt was last seen by PCP about 2 months ago. Pt lives in 1 story home with spouse, 3 steps to enter into the home. Pt has been admitted to [facility] for past 3 days per spouse. Pt needs assistance with ADLs including dressing per spouse and arcos snot drive. Pt has DMEs such as walker. Pt family able to transport home, to doctor apt,  medications, and get groceries depending on recovery and return to [facility]. Pt primary pharmacy is [facility] in [place]. Pt able to afford medications. Spouse plans for pt to return to [facility] but also open to other options depending on pt recovery and progress. Pt has not been to [facility] but has used [facility] in past. CM to continue to follow and monitor for any needs that may occur. Care Management Interventions  PCP Verified by CM: Yes (Dr. Anastasia Piedra)  Mode of Transport at Discharge:  Other (see comment) (family depending on recovery )  Transition of Care Consult (CM Consult): Discharge Planning  Discharge Durable Medical Equipment: No  Physical Therapy Consult: No  Occupational Therapy Consult: No  Speech Therapy Consult: No  Current Support Network: Lives with Spouse, Own Home, Other (lives iwth spouse but for past 3 days been at [facility])  Confirm Follow Up Transport: Family  The Plan for Transition of Care is Related to the Following Treatment Goals : Return to [employer] Provided?: No  Discharge Location  Discharge Placement: Unable to determine at this time (depends on pt recovery )

## 2021-08-02 NOTE — PROGRESS NOTES
Bedside and verbal shift change report received from  Saeid (offgoing nurse). Report included the following information SBAR, Kardex, ED Summary, Intake/Output, MAR, Recent Results and Med Rec Status. Dual skin assessment completed at bedside: No impairments noted.  Allevyn in place (list pertinent skin assessment findings)    Dual verification of gtts completed (name of gtts verified): Fentanyl @ 75 mcg/hr

## 2021-08-02 NOTE — ROUTINE PROCESS
Ventilator check complete; patient has a #7. 0 ET tube secured at the 26 at the lip. Patient is sedated. Patient is not able to follow commands. Breath sounds are clear. Trachea is midline, Negative for subcutaneous air, and chest excursion is symmetrical. Patient is also Negative for cyanosis and is Negative for pitting edema. All alarms are set and audible. Resuscitation bag and mask are at the head of the bed.       Ventilator Settings  Mode FIO2 Rate Tidal Volume Pressure PEEP I:E Ratio   VC+  35 %   16 450 ml     8 cm H20  1:3.2      Peak airway pressure: 20 cm H2O   Minute ventilation: 7.8 l/min       Tova Sterling

## 2021-08-02 NOTE — PROGRESS NOTES
Creatine kinase and myoglobin are both elevated which may be from neuroleptics, holding PD medications, or systemic illness. PD medications have been restarted. 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

## 2021-08-02 NOTE — INTERDISCIPLINARY ROUNDS
Interdisciplinary team rounds were held 8/2/2021 with the following team members:Care Management, Nursing, Nurse Practitioner, Nutrition, Occupational Therapy, Pastoral Care, Pharmacy, Physical Therapy, Physician, Respiratory Therapy and Clinical Coordinator and the patient. Plan of care discussed. See clinical pathway and/or care plan for interventions and desired outcomes.

## 2021-08-02 NOTE — PROGRESS NOTES
Ventilator check complete; patient has a #7. 0 ET tube secured at the 25 at the teeth. Patient is sedated. Patient is not able to follow commands. Breath sounds are coarse and diminished. Trachea is midline, Negative for subcutaneous air, and chest excursion is symmetric. Patient is also Negative for cyanosis and is Negative for pitting edema. All alarms are set and audible. Resuscitation bag is at the head of the bed.       Ventilator Settings  Mode FIO2 Rate Tidal Volume Pressure PEEP I:E Ratio   VC+, SIMV  35 %   16 450 ml     8 cm H20  1:1:3.17      Peak airway pressure: 17 cm H2O   Minute ventilation: 8.34 l/min       Elvie Hanna, RT

## 2021-08-02 NOTE — PROGRESS NOTES
Problem: Ventilator Management  Goal: *Adequate oxygenation and ventilation  Outcome: Progressing Towards Goal  Goal: *Patient maintains clear airway/free of aspiration  Outcome: Progressing Towards Goal  Goal: *Absence of infection signs and symptoms  Outcome: Progressing Towards Goal  Goal: *Normal spontaneous ventilation  Outcome: Progressing Towards Goal     Problem: Patient Education: Go to Patient Education Activity  Goal: Patient/Family Education  Outcome: Progressing Towards Goal     Problem: Pressure Injury - Risk of  Goal: *Prevention of pressure injury  Description: Document Chandler Scale and appropriate interventions in the flowsheet. Outcome: Progressing Towards Goal  Note: Pressure Injury Interventions:  Sensory Interventions: Assess changes in LOC, Assess need for specialty bed, Minimize linen layers, Keep linens dry and wrinkle-free, Turn and reposition approx.  every two hours (pillows and wedges if needed)    Moisture Interventions: Apply protective barrier, creams and emollients, Check for incontinence Q2 hours and as needed, Internal/External urinary devices, Minimize layers    Activity Interventions: Pressure redistribution bed/mattress(bed type), Assess need for specialty bed    Mobility Interventions: Assess need for specialty bed, Pressure redistribution bed/mattress (bed type)    Nutrition Interventions: Document food/fluid/supplement intake    Friction and Shear Interventions: Apply protective barrier, creams and emollients, Foam dressings/transparent film/skin sealants, Minimize layers, Transferring/repositioning devices                Problem: Patient Education: Go to Patient Education Activity  Goal: Patient/Family Education  Outcome: Progressing Towards Goal     Problem: Delirium Treatment  Goal: *Level of consciousness restored to baseline  Outcome: Progressing Towards Goal  Goal: *Level of environmental perceptions restored to baseline  Outcome: Progressing Towards Goal  Goal: *Sensory perception restored to baseline  Outcome: Progressing Towards Goal  Goal: *Emotional stability restored to baseline  Outcome: Progressing Towards Goal  Goal: *Functional assessment restored to baseline  Outcome: Progressing Towards Goal  Goal: *Absence of falls  Outcome: Progressing Towards Goal  Goal: *Will remain free of delirium, CAM Score negative  Outcome: Progressing Towards Goal  Goal: *Cognitive status will be restored to baseline  Outcome: Progressing Towards Goal  Goal: Interventions  Outcome: Progressing Towards Goal     Problem: Patient Education: Go to Patient Education Activity  Goal: Patient/Family Education  Outcome: Progressing Towards Goal     Problem: Falls - Risk of  Goal: *Absence of Falls  Description: Document Camila Ibarra Fall Risk and appropriate interventions in the flowsheet.   Outcome: Progressing Towards Goal  Note: Fall Risk Interventions:       Mentation Interventions: Bed/chair exit alarm, Door open when patient unattended, More frequent rounding, Toileting rounds    Medication Interventions: Bed/chair exit alarm, Evaluate medications/consider consulting pharmacy    Elimination Interventions: Bed/chair exit alarm, Toileting schedule/hourly rounds              Problem: Patient Education: Go to Patient Education Activity  Goal: Patient/Family Education  Outcome: Progressing Towards Goal     Problem: Non-Violent Restraints  Goal: Removal from restraints as soon as assessed to be safe  Outcome: Progressing Towards Goal  Goal: No harm/injury to patient while restraints in use  Outcome: Progressing Towards Goal  Goal: Patient's dignity will be maintained  Outcome: Progressing Towards Goal  Goal: Patient Interventions  Outcome: Progressing Towards Goal

## 2021-08-02 NOTE — PROGRESS NOTES
Ventilator check complete; patient has a #7. 0 ET tube secured at the 26 at the lip. Patient is sedated. Patient is not able to follow commands. Breath sounds are coarse. Trachea is midline, Negative for subcutaneous air, and chest excursion is symmetric. Patient is also Negative for cyanosis. All alarms are set and audible. Resuscitation bag is at the head of the bed. Ventilator Settings  Mode FIO2 Rate Tidal Volume Pressure PEEP I:E Ratio   VC+  35 %   16 450 ml     8 cm H20  1:3.17      Peak airway pressure: 22 cm H2O   Minute ventilation: 8.17 l/min     ABG: No results for input(s): PH, PCO2, PO2, HCO3 in the last 72 hours.       Tennille Wilson

## 2021-08-02 NOTE — PROGRESS NOTES
Bedside shift change report given to Praneeth Avery RN and Charu Sanchez RN (oncoming nurse) by Elza Glass (offgoing nurse). Report included the following information SBAR, Kardex, Intake/Output, MAR, Recent Results, Med Rec Status and Cardiac Rhythm NSR.

## 2021-08-02 NOTE — PROGRESS NOTES
Medication Review:    [facility] 7/29-8/1    Atorvastatin 40mg qhs  Sinemet 10mg/100mg at 2100  Sinemet 25/100mg 9am, 1400,2100  Flonase daily  Lamictal 200mg bid  Xalatan 0.005% 1 drop OU 2100  Zestril 5mg daily  Claritin 10mg daily  Antivert 12.5 bid  zyprexa 2.5mg 2100   prilosec 40mg daily    Parris Ruiz MD

## 2021-08-02 NOTE — CONSULTS
Consult    Patient: Ten Redding MRN: [id number]     YOB: 1935  Age: 81 y.o. Sex: male      Subjective:      Ten Redding is a 81 y.o. male who is being seen for parkinsonism    The patient has a history of parkinsonism. I reviewed the patient's chart and he has a history of tremor since 2011. Over the last several months the patient has noted worsening bradykinesia and rigidity. He developed a shuffling gait. He also reported poor balance. He has had multiple falls related to his poor balance. He was started on Sinemet 0.5 mg tablet nightly. No benefits were seen. He was also on Keppra for possible seizures in the past.  He was also started on Lamictal and got up to 200 mg twice daily which helped with these seizure-like events. He was also on mirtazapine and propranolol. In March 2021 he started to have visual hallucinations. His wife has noticed significant short-term memory loss. He stopped driving in March 8435. In March the patient also had a brain MRI which showed atrophy and white matter disease. He was also started on Seroquel 12.5 mg nightly which was increased to 25 mg nightly. Patient was recently admitted to [address] [place] [address] for aggression and hallucinations. 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 The patient worsened and was eventually intubated in route to this hospital.  He is now in the ICU.     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    Murmur 1/18/2013    Nonspecific (abnormal) findings on radiological and other examination of other intrathoracic organs 9/4/2012    Osteopenia 7/11/2013    Peptic ulcer, unspecified site, unspecified as acute or chronic, without mention of hemorrhage, perforation, or obstruction 9/4/2012    Pulmonary infiltrate 1/18/2013    Tremor 7/11/2013    Vertigo 7/11/2013    Vitamin D deficiency 7/11/2013    Weight loss 1/18/2013     Past Surgical History:   Procedure Laterality Date    HX BACK SURGERY  1990    herniated disc    HX CHOLECYSTECTOMY  2000    HX GASTRECTOMY  1960    due to DU    HX HEENT  1994    sinus    HX HERNIA REPAIR  1992    right inguinal    HX ORTHOPAEDIC  rt shoulder    1999      Family History   Problem Relation Age of Onset   [facility] Arthritis-osteo Mother     Heart Disease Mother     Alzheimer Mother     Heart Disease Father     Arthritis-osteo Brother     Other Other         General Fam Hx.  of aneurysms    Heart Disease Sister         Valve replacement     Social History     Tobacco Use    Smoking status: Never Smoker    Smokeless tobacco: Never Used   Substance Use Topics    Alcohol use: No     Alcohol/week: 0.0 standard drinks      Current Facility-Administered Medications   Medication Dose Route Frequency Provider Last Rate Last Admin    sodium chloride (NS) flush 5-40 mL  5-40 mL IntraVENous Q8H Cornelious Crutch D, NP   10 mL at 08/02/21 0508    sodium chloride (NS) flush 5-40 mL  5-40 mL IntraVENous PRN Cornelious Crutch D, NP        acetaminophen (TYLENOL) tablet 650 mg  650 mg Oral Q6H PRN Cornelious Crutch D, NP        Or   [facility] acetaminophen (TYLENOL) suppository 650 mg  650 mg Rectal Q6H PRN Cornelious Crutch D, NP        polyethylene glycol (MIRALAX) packet 17 g  17 g Oral DAILY PRN Cornelious Crutch D, NP        ondansetron (ZOFRAN ODT) tablet 4 mg  4 mg Oral Q8H PRN Cornelious Crutch D, NP        Or    ondansetron (ZOFRAN) injection 4 mg  4 mg IntraVENous Q6H PRN Cornelious Crutch D, NP        enoxaparin (LOVENOX) injection 40 mg  40 mg SubCUTAneous DAILY Wyatt Hipps D, NP   40 mg at 08/02/21 0920    famotidine (PF) (PEPCID) 20 mg in 0.9% sodium chloride 10 mL injection  20 mg IntraVENous Q12H Wyatt Hipps D, NP   20 mg at 08/02/21 0920    cefTRIAXone (ROCEPHIN) 1 g in 0.9% sodium chloride (MBP/ADV) 50 mL MBP  1 g IntraVENous Q12H Stephen Groves  mL/hr at 08/02/21 0153 1 g at 08/02/21 0153    azithromycin (ZITHROMAX) 500 mg in 0.9% sodium chloride 250 mL (VIAL-MATE)  500 mg IntraVENous Q24H Stephen Groves  mL/hr at 08/01/21 1601 500 mg at 08/01/21 1601    NUTRITIONAL SUPPORT ELECTROLYTE PRN ORDERS   Does Not Apply PRN Stephen Groves MD            Allergies   Allergen Reactions    Codeine Nausea and Vomiting    Gadolinium-Containing Contrast Media Nausea and Vomiting       Review of Systems:  Could not obtain due to altered mental status        Objective:     Vitals:    08/02/21 0701 08/02/21 0731 08/02/21 0801 08/02/21 0807   BP: (!) 86/50 (!) 102/57 (!) 92/53    Pulse: 78 79 77 91   Resp: 16 17 16 15   Temp:  98.6 °F (37 °C)     SpO2: 100% 100% 100% 100%   Weight:       Height:            Physical Exam:  General -elderly male in moderate distress. HEENT - Normocephalic, atraumatic. Conjunctiva, tympanic membranes, and oropharynx are clear. Neck - Supple without masses, no bruits   Cardiovascular - Regular rate and rhythm. Normal S1, S2 without murmurs, rubs, or gallops. Lungs - Clear to auscultation on the ventilator   Abdomen - Soft, nontender with normal bowel sounds. Extremities - Peripheral pulses intact. No edema and no rashes. Neurological examination -  Comprehension is inconsistently intact to one-step commands. Language and speech cannot be assessed while on the ventilator. On cranial nerve examination pupils are equal round and reactive to light. Could not participate in funduscopic examination, visual acuity, or visual field testing. Face is symmetric. Hearing is intact to conversation. Motor examination -increased muscle tone in all 4 limbs with prominent paratonia and likely some cogwheel rigidity. Superimposed myoclonus. He cannot follow commands for power testing but is able to lift all 4 limbs against gravity. Muscle stretch reflexes are diminished throughout. He could not participate in sensory examination, cerebellar examination, gait and stance due to altered mental status. Lab Results   Component Value Date/Time    Cholesterol, total 106 05/09/2016 09:56 AM    HDL Cholesterol 40 (L) 05/09/2016 09:56 AM    LDL, calculated 58 05/09/2016 09:56 AM    VLDL, calculated 9 05/09/2016 09:56 AM    Triglyceride 44 05/09/2016 09:56 AM    CHOL/HDL Ratio 2.7 05/09/2016 09:56 AM        Lab Results   Component Value Date/Time    Hemoglobin A1c 5.6 02/08/2016 08:30 AM          Results for orders placed or performed during the hospital encounter of 08/01/21   EKG, 12 LEAD, INITIAL   Result Value Ref Range    Ventricular Rate 83 BPM    Atrial Rate 83 BPM    P-R Interval 222 ms    QRS Duration 144 ms    Q-T Interval 412 ms    QTC Calculation (Bezet) 484 ms    Calculated P Axis 84 degrees    Calculated R Axis -73 degrees    Calculated T Axis 79 degrees    Diagnosis       Sinus rhythm with 1st degree A-V block  Possible Left atrial enlargement  Right bundle branch block  Left anterior fascicular block  !!! Bifascicular block !!!   Left ventricular hypertrophy  Cannot rule out Anteroseptal infarct (cited on or before 02-AUG-2021)  Abnormal ECG  When compared with ECG of 02-AUG-2021 09:59,  No significant change was found             Most recent CT Personally Reviewed  Results from Hospital Encounter encounter on 08/01/21    CT HEAD WO CONT    Narrative  CT of the head without contrast.    CLINICAL INDICATION: Altered mental status    PROCEDURE: Serial thin section axial images are obtained from the cranial vertex  through the skull base without the administration of intravenous contrast.  Radiation dose reduction techniques were used for this study. Our CT scanners  use one or all of the following: Automated exposure control, adjusted of the mA  and/or kV according to patient size, iterative reconstruction    COMPARISON: No prior. FINDINGS: There is no acute intracranial hemorrhage, mass, or mass effect. No  abnormal extra-axial fluid collections identified. There is no hydrocephalus. The basilar cisterns are widely patent. Moderate bilateral cerebral atrophy. There is patchy white matter hypoattenuation noted bilaterally. No findings  present to indicate large, cortical-based territorial infarction. No skull  fracture or aggressive osseous lesion noted. The mastoid air cells and included  paranasal sinuses are clear. Impression  1. No acute intracranial abnormality. 2. 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 Assessment:     28-year-old man with worsening parkinsonism over the last 6 months. Neurological examination shows increased muscle tone, but not leadpipe rigidity, and myoclonus. Plan: We will check creatine kinase and myoglobin. It is important to know if the patient received neuroleptics while at [facility]. Neuroleptic malignant syndrome needs to be investigated. An accurate list of the patient's recently prescribed medications needs to be obtained. The patient is on Sinemet then this should be restarted. The last documentation I see is that the patient was taking either 0.5 mg or 1 mg of Sinemet nightly. We will continue to follow.     Signed By: Caitlin Navarro DO     August 2, 2021

## 2021-08-02 NOTE — PROGRESS NOTES
Critical Care Daily Progress Note: 8/2/2021    Dillan Jean   Admission Date: 8/1/2021            Dillan Jean is an 81yoM with history of bronchiectasis status post treatment for MAC, GERD, TIAs, hypertension, hyperlipidemia, coronary artery disease status post PCI x3,  status post pacemaker, PUD status post partial gastrectomy. He was recently admitted at [facility] for aggression, hallucinations, confusion, thought related to Parkinson's dementia which has been diagnosed this year. He has been under the care of Dr. Yu Prior Dr. Ace Haney. He was admitted from [facility] on 8/1/2021 with altered mental status, poor p.o. intake & he was intubated in transit. Subjective:     No major overnight events. Not requiring much support from the ventilator  Tm 100.2. Has some jerking motions. Opens eyes, protrudes tongue, squeezes hands. No fevers. No leukocytosis.     Current Facility-Administered Medications   Medication Dose Route Frequency    propofoL (DIPRIVAN) 10 mg/mL injection  0-50 mcg/kg/min IntraVENous TITRATE    sodium chloride (NS) flush 5-40 mL  5-40 mL IntraVENous Q8H    sodium chloride (NS) flush 5-40 mL  5-40 mL IntraVENous PRN    acetaminophen (TYLENOL) tablet 650 mg  650 mg Oral Q6H PRN    Or    acetaminophen (TYLENOL) suppository 650 mg  650 mg Rectal Q6H PRN    polyethylene glycol (MIRALAX) packet 17 g  17 g Oral DAILY PRN    ondansetron (ZOFRAN ODT) tablet 4 mg  4 mg Oral Q8H PRN    Or    ondansetron (ZOFRAN) injection 4 mg  4 mg IntraVENous Q6H PRN    enoxaparin (LOVENOX) injection 40 mg  40 mg SubCUTAneous DAILY    famotidine (PF) (PEPCID) 20 mg in 0.9% sodium chloride 10 mL injection  20 mg IntraVENous Q12H    fentaNYL in normal saline (pf) 25 mcg/mL infusion  0-200 mcg/hr IntraVENous TITRATE    cefTRIAXone (ROCEPHIN) 1 g in 0.9% sodium chloride (MBP/ADV) 50 mL MBP  1 g IntraVENous Q12H    azithromycin (ZITHROMAX) 500 mg in 0.9% sodium chloride 250 mL (VIAL-MATE) 500 mg IntraVENous Q24H    NUTRITIONAL SUPPORT ELECTROLYTE PRN ORDERS   Does Not Apply PRN       Review of Systems  Unobtainable due to patient status. Objective:     Vitals:    08/02/21 0701 08/02/21 0731 08/02/21 0801 08/02/21 0807   BP: (!) 86/50 (!) 102/57 (!) 92/53    Pulse: 78 79 77 91   Resp: 16 17 16 15   Temp:  98.6 °F (37 °C)     SpO2: 100% 100% 100% 100%   Weight:       Height:             Intake/Output Summary (Last 24 hours) at 8/2/2021 0908  Last data filed at 8/2/2021 0731  Gross per 24 hour   Intake 1303.6 ml   Output 420 ml   Net 883.6 ml       Physical Exam:          Constitutional:  the patient is well developed and in no acute distress  EENMT:  Sclera clear, pupils equal, oral mucosa moist  Respiratory: CTAB  Cardiovascular:  RRR without M,G,R  Gastrointestinal: soft and non-tender; with positive bowel sounds. Musculoskeletal: warm without cyanosis. There is *no lower extremity edema. Skin:  no jaundice or rashes, no wounds   Neurologic: jerking movements diffusely, follows limited commands    LINES:  PIV  NGT    DRIPS:  none    CXR:       LAB  UA negative  procal low  Blood cultures neg  No results for input(s): GLUCPOC in the last 72 hours.     No lab exists for component: [place]   Recent Labs     08/02/21  0305 08/01/21  1048   WBC 7.7 7.0   HGB 10.4* 11.0*   HCT 30.8* 32.3*    219     Recent Labs     08/02/21  0305 08/01/21  1519 08/01/21  1048    139 139   K 3.8 4.3 3.8    105 103   CO2 29 28 29   * 138* 122*   BUN 29* 24* 22   CREA 1.03 0.83 0.90   MG 2.1  --  2.0   PHOS 2.9  --   --    CA 8.2* 8.6 8.5   ALB  --   --  3.3   TBILI  --   --  0.9   ALT  --   --  15     Recent Labs     08/02/21  0402 08/01/21  1730 08/01/21  1149   PHI 7.39 7.41 7.39   PCO2I 42.4 40.5 42.5   PO2I 95 95 323*   HCO3I 25.9 25.6 25.8     Recent Labs     08/01/21  1804 08/01/21  1048   LAC 1.6 1.6     Recent Labs     08/02/21  0402 08/01/21  1730 08/01/21  1149   PHI 7.39 7.41 7.39 PCO2I 42.4 40.5 42.5   PO2I 95 95 323*   HCO3I 25.9 25.6 25.8         Assessment:  (Medical Decision Making)     Hospital Problems  Date Reviewed: 5/23/2016        Codes Class Noted POA    * (Principal) Respiratory failure with hypoxia ([facility].) ICD-10-CM: J96.91  ICD-9-CM: 518.81  8/1/2021 Unknown        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 Wife bringing in home medications. --Will extubate if passes spontaneous breathing trial.  --Check sputum cultures. Day 2 ceftriaxone/azithro with low threshold to discontinue abx if cultures neg.  --appreciate neurology input  --DVT/GI ppx    More than 50% of the time documented was spent in face-to-face contact with the patient and in the care of the patient on the floor/unit where the patient is located.     Marques Allison MD

## 2021-08-03 ENCOUNTER — APPOINTMENT (OUTPATIENT)
Dept: GENERAL RADIOLOGY | Age: 86
DRG: 056 | End: 2021-08-03
Attending: INTERNAL MEDICINE
Payer: MEDICARE

## 2021-08-03 LAB
ANION GAP SERPL CALC-SCNC: 7 MMOL/L (ref 7–16)
ARTERIAL PATENCY WRIST A: POSITIVE
BASE DEFICIT BLD-SCNC: 0.4 MMOL/L
BDY SITE: ABNORMAL
BUN SERPL-MCNC: 26 MG/DL (ref 8–23)
CALCIUM SERPL-MCNC: 8.2 MG/DL (ref 8.3–10.4)
CHLORIDE SERPL-SCNC: 111 MMOL/L (ref 98–107)
CK SERPL-CCNC: 854 U/L (ref 21–215)
CO2 SERPL-SCNC: 25 MMOL/L (ref 21–32)
CREAT SERPL-MCNC: 0.6 MG/DL (ref 0.8–1.5)
ERYTHROCYTE [DISTWIDTH] IN BLOOD BY AUTOMATED COUNT: 13.9 % (ref 11.9–14.6)
GAS FLOW.O2 O2 DELIVERY SYS: ABNORMAL L/MIN
GLUCOSE SERPL-MCNC: 116 MG/DL (ref 65–100)
HCO3 BLD-SCNC: 25.1 MMOL/L (ref 22–26)
HCT VFR BLD AUTO: 32.9 % (ref 41.1–50.3)
HGB BLD-MCNC: 10.3 G/DL (ref 13.6–17.2)
INSPIRATION.DURATION SETTING TIME VENT: 0.9 SEC
MCH RBC QN AUTO: 32.7 PG (ref 26.1–32.9)
MCHC RBC AUTO-ENTMCNC: 31.3 G/DL (ref 31.4–35)
MCV RBC AUTO: 104.4 FL (ref 79.6–97.8)
NRBC # BLD: 0 K/UL (ref 0–0.2)
O2/TOTAL GAS SETTING VFR VENT: 35 %
PAW @ MEAN EXP FLOW ON VENT: 11 CMH2O
PCO2 BLD: 43.8 MMHG (ref 35–45)
PEEP RESPIRATORY: 8 CMH2O
PH BLD: 7.37 [PH] (ref 7.35–7.45)
PIP ISTAT,IPIP: 16
PLATELET # BLD AUTO: 154 K/UL (ref 150–450)
PMV BLD AUTO: 10.4 FL (ref 9.4–12.3)
PO2 BLD: 119 MMHG (ref 75–100)
POTASSIUM SERPL-SCNC: 4.2 MMOL/L (ref 3.5–5.1)
PRESSURE SUPPORT SETTING VENT: 0 CMH2O
RBC # BLD AUTO: 3.15 M/UL (ref 4.23–5.6)
SAO2 % BLD: 98.5 % (ref 95–98)
SERVICE CMNT-IMP: ABNORMAL
SERVICE CMNT-IMP: ABNORMAL
SODIUM SERPL-SCNC: 143 MMOL/L (ref 136–145)
SPECIMEN TYPE: ABNORMAL
TOTAL RESP. RATE, ITRR: 23
VENTILATION MODE VENT: ABNORMAL
VT SETTING VENT: 450 ML
WBC # BLD AUTO: 5.9 K/UL (ref 4.3–11.1)

## 2021-08-03 PROCEDURE — 99233 SBSQ HOSP IP/OBS HIGH 50: CPT | Performed by: INTERNAL MEDICINE

## 2021-08-03 PROCEDURE — 74011250636 HC RX REV CODE- 250/636: Performed by: NURSE PRACTITIONER

## 2021-08-03 PROCEDURE — 74018 RADEX ABDOMEN 1 VIEW: CPT

## 2021-08-03 PROCEDURE — 74011250636 HC RX REV CODE- 250/636: Performed by: INTERNAL MEDICINE

## 2021-08-03 PROCEDURE — 82550 ASSAY OF CK (CPK): CPT

## 2021-08-03 PROCEDURE — 74011250637 HC RX REV CODE- 250/637: Performed by: NURSE PRACTITIONER

## 2021-08-03 PROCEDURE — 74011000250 HC RX REV CODE- 250: Performed by: INTERNAL MEDICINE

## 2021-08-03 PROCEDURE — 74011000258 HC RX REV CODE- 258: Performed by: INTERNAL MEDICINE

## 2021-08-03 PROCEDURE — 74011250637 HC RX REV CODE- 250/637: Performed by: PSYCHIATRY & NEUROLOGY

## 2021-08-03 PROCEDURE — APPSS45 APP SPLIT SHARED TIME 31-45 MINUTES: Performed by: NURSE PRACTITIONER

## 2021-08-03 PROCEDURE — 82803 BLOOD GASES ANY COMBINATION: CPT

## 2021-08-03 PROCEDURE — 85027 COMPLETE CBC AUTOMATED: CPT

## 2021-08-03 PROCEDURE — 36600 WITHDRAWAL OF ARTERIAL BLOOD: CPT

## 2021-08-03 PROCEDURE — 36415 COLL VENOUS BLD VENIPUNCTURE: CPT

## 2021-08-03 PROCEDURE — 71045 X-RAY EXAM CHEST 1 VIEW: CPT

## 2021-08-03 PROCEDURE — 94003 VENT MGMT INPAT SUBQ DAY: CPT

## 2021-08-03 PROCEDURE — 74011250637 HC RX REV CODE- 250/637: Performed by: INTERNAL MEDICINE

## 2021-08-03 PROCEDURE — 99233 SBSQ HOSP IP/OBS HIGH 50: CPT | Performed by: PSYCHIATRY & NEUROLOGY

## 2021-08-03 PROCEDURE — 65610000001 HC ROOM ICU GENERAL

## 2021-08-03 PROCEDURE — 80048 BASIC METABOLIC PNL TOTAL CA: CPT

## 2021-08-03 RX ADMIN — AZITHROMYCIN MONOHYDRATE 500 MG: 500 INJECTION, POWDER, LYOPHILIZED, FOR SOLUTION INTRAVENOUS at 14:55

## 2021-08-03 RX ADMIN — CEFTRIAXONE 1 G: 1 INJECTION, POWDER, FOR SOLUTION INTRAMUSCULAR; INTRAVENOUS at 09:13

## 2021-08-03 RX ADMIN — Medication 10 ML: at 15:05

## 2021-08-03 RX ADMIN — ACETAMINOPHEN 650 MG: 325 TABLET ORAL at 22:02

## 2021-08-03 RX ADMIN — LORAZEPAM 2 MG: 2 INJECTION INTRAMUSCULAR; INTRAVENOUS at 05:34

## 2021-08-03 RX ADMIN — CARBIDOPA AND LEVODOPA 1 TABLET: 25; 100 TABLET ORAL at 09:12

## 2021-08-03 RX ADMIN — CARBIDOPA AND LEVODOPA 1 TABLET: 25; 100 TABLET ORAL at 22:02

## 2021-08-03 RX ADMIN — Medication 10 ML: at 05:08

## 2021-08-03 RX ADMIN — CARBIDOPA AND LEVODOPA 1 TABLET: 10; 100 TABLET ORAL at 22:02

## 2021-08-03 RX ADMIN — Medication 10 ML: at 22:02

## 2021-08-03 RX ADMIN — CARBIDOPA AND LEVODOPA 1 TABLET: 25; 100 TABLET ORAL at 14:55

## 2021-08-03 RX ADMIN — FAMOTIDINE 20 MG: 10 INJECTION INTRAVENOUS at 09:12

## 2021-08-03 RX ADMIN — BROMOCRIPTINE MESYLATE 2.5 MG: 2.5 TABLET ORAL at 16:35

## 2021-08-03 RX ADMIN — BROMOCRIPTINE MESYLATE 2.5 MG: 2.5 TABLET ORAL at 09:12

## 2021-08-03 RX ADMIN — LORAZEPAM 2 MG: 2 INJECTION INTRAMUSCULAR; INTRAVENOUS at 01:28

## 2021-08-03 RX ADMIN — BROMOCRIPTINE MESYLATE 2.5 MG: 2.5 TABLET ORAL at 22:02

## 2021-08-03 RX ADMIN — ENOXAPARIN SODIUM 40 MG: 40 INJECTION SUBCUTANEOUS at 09:12

## 2021-08-03 NOTE — PROGRESS NOTES
Bedside and verbal shift change report received from  [facility], [address] [place] [address] and Fanta Duron RN (offgoing nurse). Report included the following information SBAR, Kardex, Intake/Output, MAR, Recent Results, Med Rec Status, Cardiac Rhythm NSR, Alarm Parameters  and Dual Neuro Assessment.      Dual skin assessment completed at bedside: n/a (list pertinent skin assessment findings)    Dual verification of gtts completed (name of gtts verified): n/a

## 2021-08-03 NOTE — PROGRESS NOTES
Critical Care Daily Progress Note: 8/3/2021    Lisseth Galicia   Admission Date: 8/1/2021            Lisseth Galicia is an 81yoM with history of bronchiectasis status post treatment for MAC, GERD, TIAs, hypertension, hyperlipidemia, coronary artery disease status post PCI x3,  status post pacemaker, PUD status post partial gastrectomy. He was recently admitted at [facility] for aggression, hallucinations, confusion, thought related to Parkinson's dementia which has been diagnosed this year. He has been under the care of Dr. Nato Haddad. He was admitted from [facility] on 8/1/2021 with altered mental status, poor p.o. intake & he was intubated in transit.      Subjective:     Awake and alert and tolerated PSV and is now extubated to NC    Current Facility-Administered Medications   Medication Dose Route Frequency    cefTRIAXone (ROCEPHIN) 1 g in 0.9% sodium chloride (MBP/ADV) 50 mL MBP  1 g IntraVENous Q24H    famotidine (PF) (PEPCID) 20 mg in 0.9% sodium chloride 10 mL injection  20 mg IntraVENous Q24H    LORazepam (ATIVAN) injection 2 mg  2 mg IntraVENous Q4H PRN    carbidopa-levodopa (SINEMET)  mg per tablet 1 Tablet  1 Tablet Per NG tube QHS    carbidopa-levodopa (SINEMET)  mg per tablet 1 Tablet  1 Tablet Per NG tube TID    bromocriptine (PARLODEL) tablet 2.5 mg  2.5 mg Per NG tube TID    sodium chloride (NS) flush 5-40 mL  5-40 mL IntraVENous Q8H    sodium chloride (NS) flush 5-40 mL  5-40 mL IntraVENous PRN    acetaminophen (TYLENOL) tablet 650 mg  650 mg Oral Q6H PRN    Or    acetaminophen (TYLENOL) suppository 650 mg  650 mg Rectal Q6H PRN    polyethylene glycol (MIRALAX) packet 17 g  17 g Oral DAILY PRN    ondansetron (ZOFRAN ODT) tablet 4 mg  4 mg Oral Q8H PRN    Or    ondansetron (ZOFRAN) injection 4 mg  4 mg IntraVENous Q6H PRN    enoxaparin (LOVENOX) injection 40 mg  40 mg SubCUTAneous DAILY    azithromycin (ZITHROMAX) 500 mg in 0.9% sodium chloride 250 mL (VIAL-MATE)  500 mg IntraVENous Q24H    NUTRITIONAL SUPPORT ELECTROLYTE PRN ORDERS   Does Not Apply PRN       Review of Systems  Unobtainable due to patient status. Objective:     Vitals:    08/03/21 1001 08/03/21 1016 08/03/21 1031 08/03/21 1036   BP: (!) 164/72   (!) 164/72   Pulse: 91 89 92 92   Resp: 19 23 22 22   Temp:    97.8 °F (36.6 °C)   SpO2: 98% 100% 100%    Weight:       Height:             Intake/Output Summary (Last 24 hours) at 8/3/2021 1041  Last data filed at 8/3/2021 0701  Gross per 24 hour   Intake 46769.67 ml   Output 1000 ml   Net 54686.67 ml       Physical Exam:          Constitutional:  the patient is well developed and in no acute distress extubated  EENMT:  Sclera clear, pupils equal, oral mucosa moist  Respiratory: CTAB  Cardiovascular:  RRR without M,G,R  Gastrointestinal: soft and non-tender; with positive bowel sounds. Musculoskeletal: warm without cyanosis. There is no lower extremity edema. Skin:  no jaundice or rashes, no wounds   Neurologic: jerking movements diffusely, follows limited commands    LINES:  PIV  NGT    DRIPS:  none    CXR:       LAB  UA negative  procal low  Blood cultures neg  No results for input(s): GLUCPOC in the last 72 hours. No lab exists for component: [place]   Recent Labs     08/03/21  0406 08/02/21  0305 08/01/21  1048   WBC 5.9 7.7 7.0   HGB 10.3* 10.4* 11.0*   HCT 32.9* 30.8* 32.3*    217 219     Recent Labs     08/03/21  0406 08/02/21  0305 08/01/21  1519 08/01/21  1048 08/01/21  1048    141 139   < > 139   K 4.2 3.8 4.3   < > 3.8   * 107 105   < > 103   CO2 25 29 28   < > 29   * 178* 138*   < > 122*   BUN 26* 29* 24*   < > 22   CREA 0.60* 1.03 0.83   < > 0.90   MG  --  2.1  --   --  2.0   PHOS  --  2.9  --   --   --    CA 8.2* 8.2* 8.6   < > 8.5   ALB  --   --   --   --  3.3   TBILI  --   --   --   --  0.9   ALT  --   --   --   --  15    < > = values in this interval not displayed.      Recent Labs     08/03/21  0359 08/02/21  0402 08/01/21  1730   PHI 7.37 7.39 7.41   PCO2I 43.8 42.4 40.5   PO2I 119* 95 95   HCO3I 25.1 25.9 25.6     Recent Labs     08/01/21  1804 08/01/21  1048   LAC 1.6 1.6     Recent Labs     08/03/21  0359 08/02/21  0402 08/01/21  1730   PHI 7.37 7.39 7.41   PCO2I 43.8 42.4 40.5   PO2I 119* 95 95   HCO3I 25.1 25.9 25.6         Assessment:  (Medical Decision Making)     Hospital Problems  Date Reviewed: 5/23/2016        Codes Class Noted POA    * (Principal) Respiratory failure with hypoxia ([facility].) ICD-10-CM: J96.91  ICD-9-CM: 518.81  8/1/2021 Unknown        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 Day 3 ceftriaxone/azithro with low threshold to discontinue abx if cultures neg.  --appreciate neurology input  --DVT/GI ppx  --PT OT in AM once more awake. Avoid CNS depressants  More than 50% of the time documented was spent in face-to-face contact with the patient and in the care of the patient on the floor/unit where the patient is located.     Treasa Heimlich, MD

## 2021-08-03 NOTE — PROGRESS NOTES
Respiratory Mechanics completed and are as follows:  Weaning Parameters  Spontaneous Breathing Trial Complete: No (Comments) (pt acute condition)  Henson Agitation Sedation Scale (RASS): Light sedation  Patient extubated to a 4L NC. Patient is not able to communicate and is negative for stridor. Breath sounds are coarse and diminished. No complications with extubation.      Arcadio Goldberg

## 2021-08-03 NOTE — PROGRESS NOTES
This is a follow-up visit to the patient, providing a spiritual presence, emotional support and prayer. The patient appears to be resting.     Talita Flores, [address] [facility], [facility]

## 2021-08-03 NOTE — PROGRESS NOTES
Neurology Daily Progress Note     Assessment:     71-year-old man with worsening parkinsonism over the last 6 months. Neurological examination shows increased muscle tone, but not leadpipe rigidity, and myoclonus. CK and myoglobin are both elevated. 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 No evidence of potent neuroleptic administration, which could also cause this result. Plan:     Sinemet restarted     Continue bromocriptine 2.5 mg TID    Will trend CK    Subjective: Interval history:    Patient intubated. Becomes agitated with stimulation. Moves all extremities spontaneously, but does not follow commands.  yesterday, 854 today. History:    Diana Herman is a 81 y.o. male who is being seen for PD and rigidity. Unable to obtain ROS due to AMS.         Objective:     Vitals:    08/03/21 1001 08/03/21 1016 08/03/21 1031 08/03/21 1036   BP: (!) 164/72   (!) 164/72   Pulse: 91 89 92 92   Resp: 19 23 22 22   Temp:    97.8 °F (36.6 °C)   SpO2: 98% 100% 100%    Weight:       Height:              Current Facility-Administered Medications:     cefTRIAXone (ROCEPHIN) 1 g in 0.9% sodium chloride (MBP/ADV) 50 mL MBP, 1 g, IntraVENous, Q24H, Jamshid Norman MD, Last Rate: 100 mL/hr at 08/03/21 0913, 1 g at 08/03/21 0913    famotidine (PF) (PEPCID) 20 mg in 0.9% sodium chloride 10 mL injection, 20 mg, IntraVENous, Q24H, Jamshid Norman MD, 20 mg at 08/03/21 0912    carbidopa-levodopa (SINEMET)  mg per tablet 1 Tablet, 1 Tablet, Per NG tube, QHS, Loretta Pinon MD, 1 Tablet at 08/02/21 2130    carbidopa-levodopa (SINEMET)  mg per tablet 1 Tablet, 1 Tablet, Per NG tube, TID, Loretta Pinon MD, 1 Tablet at 08/03/21 0912    bromocriptine (PARLODEL) tablet 2.5 mg, 2.5 mg, Per NG tube, TID, Karlo Myers DO, 2.5 mg at 08/03/21 0912    sodium chloride (NS) flush 5-40 mL, 5-40 mL, IntraVENous, Q8H, Kianna Pettit, NP, 10 mL at 08/03/21 0508    sodium chloride (NS) flush 5-40 mL, 5-40 mL, IntraVENous, PRN, Es WITT, NP    acetaminophen (TYLENOL) tablet 650 mg, 650 mg, Oral, Q6H PRN **OR** acetaminophen (TYLENOL) suppository 650 mg, 650 mg, Rectal, Q6H PRN, Es WITT, NP    polyethylene glycol (MIRALAX) packet 17 g, 17 g, Oral, DAILY PRN, Es WITT, NP    ondansetron (ZOFRAN ODT) tablet 4 mg, 4 mg, Oral, Q8H PRN **OR** ondansetron (ZOFRAN) injection 4 mg, 4 mg, IntraVENous, Q6H PRN, Es WITT, NP    enoxaparin (LOVENOX) injection 40 mg, 40 mg, SubCUTAneous, DAILY, Kianna Puente NP, 40 mg at 08/03/21 0912    azithromycin (ZITHROMAX) 500 mg in 0.9% sodium chloride 250 mL (VIAL-MATE), 500 mg, IntraVENous, Q24H, Arjun Villatoro MD, Last Rate: 250 mL/hr at 08/02/21 1517, 500 mg at 08/02/21 1517    NUTRITIONAL SUPPORT ELECTROLYTE PRN ORDERS, , Does Not Apply, PRN, Arjun Villatoro MD    Recent Results (from the past 12 hour(s))   BLOOD GAS, ARTERIAL POC    Collection Time: 08/03/21  3:59 AM   Result Value Ref Range    Device: ADULT VENT      FIO2 (POC) 35 %    pH (POC) 7.37 7.35 - 7.45      pCO2 (POC) 43.8 35 - 45 MMHG    pO2 (POC) 119 (H) 75 - 100 MMHG    HCO3 (POC) 25.1 22 - 26 MMOL/L    sO2 (POC) 98.5 (H) 95 - 98 %    Base deficit (POC) 0.4 mmol/L    Mode SIMV      Tidal volume 450 ml    PEEP/CPAP (POC) 8 cmH2O    Mean Airway Pressure 11 cmH2O    PIP (POC) 16      Pressure support 0 cmH2O    Allens test (POC) Positive      Inspiratory Time 0.90 sec    Total resp.  rate 23      Site RIGHT BRACHIAL      Specimen type (POC) ARTERIAL      Performed by Khoi Peck     Respiratory comment: 8.44ve    CBC W/O DIFF    Collection Time: 08/03/21  4:06 AM   Result Value Ref Range    WBC 5.9 4.3 - 11.1 K/uL    RBC 3.15 (L) 4.23 - 5.6 M/uL    HGB 10.3 (L) 13.6 - 17.2 g/dL    HCT 32.9 (L) 41.1 - 50.3 %    .4 (H) 79.6 - 97.8 FL    MCH 32.7 26.1 - 32.9 PG    MCHC 31.3 (L) 31.4 - 35.0 g/dL    RDW 13.9 11.9 - 14.6 %    PLATELET 864 096 - 953 K/uL    MPV 10.4 9.4 - 12.3 FL    ABSOLUTE NRBC 0.00 0.0 - 0.2 K/uL   METABOLIC PANEL, BASIC    Collection Time: 08/03/21  4:06 AM   Result Value Ref Range    Sodium 143 136 - 145 mmol/L    Potassium 4.2 3.5 - 5.1 mmol/L    Chloride 111 (H) 98 - 107 mmol/L    CO2 25 21 - 32 mmol/L    Anion gap 7 7 - 16 mmol/L    Glucose 116 (H) 65 - 100 mg/dL    BUN 26 (H) 8 - 23 MG/DL    Creatinine 0.60 (L) 0.8 - 1.5 MG/DL    GFR est AA >60 >60 ml/min/1.73m2    GFR est non-AA >60 >60 ml/min/1.73m2    Calcium 8.2 (L) 8.3 - 10.4 MG/DL   CK    Collection Time: 08/03/21  4:06 AM   Result Value Ref Range     (H) 21 - 215 U/L         Physical Exam:  General -elderly male. Intubated and agitated. HEENT - Normocephalic, atraumatic. Conjunctiva, tympanic membranes, and oropharynx are clear. Neck - Supple without masses, no bruits   Extremities - Peripheral pulses intact. No edema and no rashes.      Neurological examination -  Obtunded. Does not follow commands. Language and speech cannot be assessed while on the ventilator. On cranial nerve examination pupils are equal round and reactive to light. Could not participate in funduscopic examination, visual acuity, or visual field testing. Face is symmetric. Hearing is intact. Motor examination -increased muscle tone in all 4 limbs with prominent paratonia and likely some cogwheel rigidity. Superimposed myoclonus. He cannot follow commands for power testing but is able to lift all 4 limbs against gravity. Muscle stretch reflexes are diminished throughout.   He could not participate in sensory examination, cerebellar examination, gait and stance due to altered mental status.           Signed By: Yasmine Wise NP     August 3, 2021

## 2021-08-03 NOTE — PROGRESS NOTES
Comprehensive Nutrition Assessment    Type and Reason for Visit: Reassess  Tube Feeding Management (Pulmonary)    Nutrition Recommendations/Plan:   Pt's mentation at this time will prevent him from meeting needs orally despite extubation. Pt will benefit form ongoing enteral feedings for primary needs to limit electrolyte derangements associated with poor nutrition. Enteral Nutrition:  Initiate Jevity 1.5 via NGT at 30 ml/hour, progress by 10 ml/4 hours to goal rate of 50 ml/hour when cleared by pulmonary for okay to begin enteral feeds. Water flush 135 every 4 hours. At goal will provide 1650 kcal (100% estimated calorie needs), 78 grams protein (100% estimated protein needs) and 1646ml free fluid (1ml/kcal) calculations based on 22 hours infusion. IV Fluids:  Per MD.   Vitamin and Mineral Supplement Therapy:  Electrolyte management replacement protocol active. Labs:   EN labs: BMP daily, Mg and Phos MWF. Malnutrition Assessment:  Malnutrition Status: Moderate malnutrition  Context: Chronic illness  Findings of clinical characteristics of malnutrition:   Energy Intake:  Unable to assess  Weight Loss:  Mild weight loss (specify amount and time period) (3-8% wt loss over the past yr)     Body Fat Loss:  7 - Severe body fat loss, Buccal region, Triceps   Muscle Mass Loss:  1 - Mild muscle mass loss, Temples (temporalis), Calf (gastrocnemius), Thigh (quadriceps) (moderate)  Fluid Accumulation:  Unable to assess,     Strength:  Not performed   Potentially severe malnutrition based on historical intake however unable to determine po d/t mentation. Nutrition Assessment:   Nutrition History: Reported not to be eating or drinking at [facility] times 2 days. Review of IM office records indicate ongoing struggles with intake and weight since January. Nutrition Background: PMH remarkable for bronchiectasis, GERD, TIAs, HLD, HTN, tremor, partial gastrectomy.    Admitted with respiratory failure w hypoxia, intubated by EMS, atypical parkinsonism. Daily Update:  Pt was extubated prior to my visit, Parkinson's meds resuming per neurology. Needs reassessed with actual body weight s/p extubation. TF goal rate was achieved 8/2 0332. Abdominal Status (last documented):  Last BM 08/03/21. Pertinent Medications: zithromax, sinemet, rocephin, pepcid  Pertinent Labs:   Lab Results   Component Value Date/Time    Sodium 143 08/03/2021 04:06 AM    Potassium 4.2 08/03/2021 04:06 AM    Chloride 111 (H) 08/03/2021 04:06 AM    CO2 25 08/03/2021 04:06 AM    Anion gap 7 08/03/2021 04:06 AM    Glucose 116 (H) 08/03/2021 04:06 AM    BUN 26 (H) 08/03/2021 04:06 AM    Creatinine 0.60 (L) 08/03/2021 04:06 AM    Calcium 8.2 (L) 08/03/2021 04:06 AM    Albumin 3.3 08/01/2021 10:48 AM    Magnesium 2.1 08/02/2021 03:05 AM    Phosphorus 2.9 08/02/2021 03:05 AM       Nutrition Related Findings:   NG placed 8/1. TF started w transfer to ICU 8/1, held this am s/p extubation. NG remains in place. Current Nutrition Therapies:  Current Tube Feeding (TF) Orders:   · Feeding Route: Nasogastric  · Formula: Peptide based  · Schedule:Continuous    · Regimen: 55 ml/hr  · Additives/Modulars:  (None)  · Water Flushes: 75 ml/4 hours  · Current TF & Flush Orders Provides: held with extubation  · Goal TF & Flush Orders Provides: 1452 kcal (100% estimated calorie needs), 91 grams protein (107% estimated protein needs) and 1431ml free fluid (~1ml/kcal) calculations based on 22 hours infusion.       Current Intake:   Average Meal Intake: NPO        Anthropometric Measures:  Height: 5' 10\" (177.8 cm)  Current Body Wt: 52.3 kg (115 lb 4.8 oz) (8/3), Weight source: Bed scale  BMI: 16.5, Underweight (BMI less than 22) age over 72  Admission Body Weight: 125 lb (source not specified)  Ideal Body Weight (lbs) (Calculated): 166 lbs (75 kg), 75.3 %  Usual Body Wt:  (Wt range x 1 year per IM records 54 kg to 56.8 kg), Percent weight change: Edema: No data recorded   Estimated Daily Nutrient Needs:  Energy (kcal/day): 2668-0343 (Kcal/kg (30-35), Weight Used: Current (52.3 kg 8/3))  Protein (g/day): 63-78 (1.2-1.5 g/kg) Weight Used: (Current)  Fluid (ml/day):   (1 ml/kcal)    Nutrition Diagnosis:   · Inadequate oral intake related to cognitive or neurological impairment as evidenced by NPO or clear liquid status due to medical condition (extubated, EN currently on hold)    Nutrition Interventions:   Food and/or Nutrient Delivery: Modify tube feeding     Coordination of Nutrition Care: Continue to monitor while inpatient  Plan of Care discussed with Christiano Marie RN and Dr. Ashia Lowe. Goals:   Previous Goal Met: Goal(s) achieved  Active Goal:  (Tolerate goal rate new TF within 48 hours)    Nutrition Monitoring and Evaluation:      Food/Nutrient Intake Outcomes: Enteral nutrition intake/tolerance  Physical Signs/Symptoms Outcomes: Biochemical data, GI status, Weight    Discharge Planning:     Too soon to determine    Howie Metz RD, LDN on 8/3/2021 at 12:39 PM  Contact: [phone]

## 2021-08-03 NOTE — PROGRESS NOTES
Ventilator check complete; patient has a #7. 0 ET tube secured at the 25 at the teeth. Patient is sedated. Patient is not able to follow commands. Breath sounds are coarse and diminished. Trachea is midline, Negative for subcutaneous air, and chest excursion is symmetric. Patient is also Negative for cyanosis. All alarms are set and audible. Resuscitation bag is at the head of the bed. Ventilator Settings  Mode FIO2 Rate Tidal Volume Pressure PEEP I:E Ratio   VC+  29 %   16 450 ml  0 cm H2O  8 cm H20  1:3.17      Peak airway pressure: 11 cm H2O   Minute ventilation: 15.7 l/min     ABG: No results for input(s): PH, PCO2, PO2, HCO3 in the last 72 hours.       Aric Del Angel

## 2021-08-04 PROBLEM — E43 SEVERE PROTEIN-CALORIE MALNUTRITION (HCC): Status: ACTIVE | Noted: 2021-08-04

## 2021-08-04 PROBLEM — J96.91 RESPIRATORY FAILURE WITH HYPOXIA (HCC): Status: RESOLVED | Noted: 2021-08-01 | Resolved: 2021-08-04

## 2021-08-04 PROBLEM — Z98.890 REQUIRED EMERGENCY INTUBATION: Status: ACTIVE | Noted: 2021-08-04

## 2021-08-04 LAB
ANION GAP SERPL CALC-SCNC: 5 MMOL/L (ref 7–16)
BUN SERPL-MCNC: 23 MG/DL (ref 8–23)
CALCIUM SERPL-MCNC: 8.8 MG/DL (ref 8.3–10.4)
CHLORIDE SERPL-SCNC: 111 MMOL/L (ref 98–107)
CK SERPL-CCNC: 876 U/L (ref 21–215)
CO2 SERPL-SCNC: 29 MMOL/L (ref 21–32)
CREAT SERPL-MCNC: 0.68 MG/DL (ref 0.8–1.5)
ERYTHROCYTE [DISTWIDTH] IN BLOOD BY AUTOMATED COUNT: 13.2 % (ref 11.9–14.6)
GLUCOSE BLD STRIP.AUTO-MCNC: 127 MG/DL (ref 65–100)
GLUCOSE BLD STRIP.AUTO-MCNC: 172 MG/DL (ref 65–100)
GLUCOSE BLD STRIP.AUTO-MCNC: 178 MG/DL (ref 65–100)
GLUCOSE SERPL-MCNC: 153 MG/DL (ref 65–100)
HCT VFR BLD AUTO: 27.9 % (ref 41.1–50.3)
HGB BLD-MCNC: 9.5 G/DL (ref 13.6–17.2)
MAGNESIUM SERPL-MCNC: 2.3 MG/DL (ref 1.8–2.4)
MCH RBC QN AUTO: 32.9 PG (ref 26.1–32.9)
MCHC RBC AUTO-ENTMCNC: 34.1 G/DL (ref 31.4–35)
MCV RBC AUTO: 96.5 FL (ref 79.6–97.8)
NRBC # BLD: 0 K/UL (ref 0–0.2)
PHOSPHATE SERPL-MCNC: 2.1 MG/DL (ref 2.3–3.7)
PLATELET # BLD AUTO: 223 K/UL (ref 150–450)
PMV BLD AUTO: 9 FL (ref 9.4–12.3)
POTASSIUM SERPL-SCNC: 3.6 MMOL/L (ref 3.5–5.1)
RBC # BLD AUTO: 2.89 M/UL (ref 4.23–5.6)
SERVICE CMNT-IMP: ABNORMAL
SODIUM SERPL-SCNC: 145 MMOL/L (ref 136–145)
WBC # BLD AUTO: 6.5 K/UL (ref 4.3–11.1)

## 2021-08-04 PROCEDURE — 80048 BASIC METABOLIC PNL TOTAL CA: CPT

## 2021-08-04 PROCEDURE — 82962 GLUCOSE BLOOD TEST: CPT

## 2021-08-04 PROCEDURE — 36415 COLL VENOUS BLD VENIPUNCTURE: CPT

## 2021-08-04 PROCEDURE — 65270000029 HC RM PRIVATE

## 2021-08-04 PROCEDURE — 74011000258 HC RX REV CODE- 258: Performed by: INTERNAL MEDICINE

## 2021-08-04 PROCEDURE — 83735 ASSAY OF MAGNESIUM: CPT

## 2021-08-04 PROCEDURE — APPSS45 APP SPLIT SHARED TIME 31-45 MINUTES: Performed by: NURSE PRACTITIONER

## 2021-08-04 PROCEDURE — 74011000302 HC RX REV CODE- 302: Performed by: FAMILY MEDICINE

## 2021-08-04 PROCEDURE — 84100 ASSAY OF PHOSPHORUS: CPT

## 2021-08-04 PROCEDURE — 99233 SBSQ HOSP IP/OBS HIGH 50: CPT | Performed by: INTERNAL MEDICINE

## 2021-08-04 PROCEDURE — 74011250637 HC RX REV CODE- 250/637: Performed by: INTERNAL MEDICINE

## 2021-08-04 PROCEDURE — 82550 ASSAY OF CK (CPK): CPT

## 2021-08-04 PROCEDURE — 74011000250 HC RX REV CODE- 250: Performed by: INTERNAL MEDICINE

## 2021-08-04 PROCEDURE — 74011250636 HC RX REV CODE- 250/636: Performed by: NURSE PRACTITIONER

## 2021-08-04 PROCEDURE — 74011250637 HC RX REV CODE- 250/637: Performed by: PSYCHIATRY & NEUROLOGY

## 2021-08-04 PROCEDURE — 85027 COMPLETE CBC AUTOMATED: CPT

## 2021-08-04 PROCEDURE — 99232 SBSQ HOSP IP/OBS MODERATE 35: CPT | Performed by: PSYCHIATRY & NEUROLOGY

## 2021-08-04 PROCEDURE — 74011250636 HC RX REV CODE- 250/636: Performed by: INTERNAL MEDICINE

## 2021-08-04 PROCEDURE — 86580 TB INTRADERMAL TEST: CPT | Performed by: FAMILY MEDICINE

## 2021-08-04 RX ORDER — CARBIDOPA AND LEVODOPA 25; 100 MG/1; MG/1
1 TABLET ORAL 3 TIMES DAILY
COMMUNITY
End: 2021-11-08 | Stop reason: SDUPTHER

## 2021-08-04 RX ORDER — OLANZAPINE 2.5 MG/1
2.5 TABLET ORAL
COMMUNITY
End: 2021-08-10

## 2021-08-04 RX ORDER — TIMOLOL MALEATE 5 MG/ML
1 SOLUTION/ DROPS OPHTHALMIC DAILY
COMMUNITY

## 2021-08-04 RX ORDER — ONDANSETRON 4 MG/1
4 TABLET, FILM COATED ORAL
COMMUNITY
End: 2021-08-10

## 2021-08-04 RX ORDER — PANTOPRAZOLE SODIUM 40 MG/1
40 TABLET, DELAYED RELEASE ORAL
Status: DISCONTINUED | OUTPATIENT
Start: 2021-08-05 | End: 2021-08-10 | Stop reason: HOSPADM

## 2021-08-04 RX ORDER — ATORVASTATIN CALCIUM 40 MG/1
40 TABLET, FILM COATED ORAL
COMMUNITY

## 2021-08-04 RX ORDER — LISINOPRIL 5 MG/1
5 TABLET ORAL DAILY
Status: DISCONTINUED | OUTPATIENT
Start: 2021-08-04 | End: 2021-08-06

## 2021-08-04 RX ORDER — CARBIDOPA AND LEVODOPA 10; 100 MG/1; MG/1
1 TABLET ORAL
COMMUNITY
End: 2021-11-08 | Stop reason: SDUPTHER

## 2021-08-04 RX ORDER — OMEPRAZOLE 40 MG/1
40 CAPSULE, DELAYED RELEASE ORAL DAILY
COMMUNITY
End: 2022-02-09

## 2021-08-04 RX ORDER — LISINOPRIL 5 MG/1
5 TABLET ORAL DAILY
Status: DISCONTINUED | OUTPATIENT
Start: 2021-08-04 | End: 2021-08-04

## 2021-08-04 RX ORDER — LAMOTRIGINE 200 MG/1
200 TABLET ORAL 2 TIMES DAILY
COMMUNITY
End: 2021-08-10

## 2021-08-04 RX ADMIN — CARBIDOPA AND LEVODOPA 1 TABLET: 25; 100 TABLET ORAL at 15:15

## 2021-08-04 RX ADMIN — BROMOCRIPTINE MESYLATE 2.5 MG: 2.5 TABLET ORAL at 09:30

## 2021-08-04 RX ADMIN — FAMOTIDINE 20 MG: 10 INJECTION INTRAVENOUS at 09:30

## 2021-08-04 RX ADMIN — Medication 10 ML: at 06:04

## 2021-08-04 RX ADMIN — Medication 10 ML: at 22:21

## 2021-08-04 RX ADMIN — AZITHROMYCIN MONOHYDRATE 500 MG: 500 INJECTION, POWDER, LYOPHILIZED, FOR SOLUTION INTRAVENOUS at 15:09

## 2021-08-04 RX ADMIN — ENOXAPARIN SODIUM 40 MG: 40 INJECTION SUBCUTANEOUS at 09:30

## 2021-08-04 RX ADMIN — CARBIDOPA AND LEVODOPA 1 TABLET: 10; 100 TABLET ORAL at 21:52

## 2021-08-04 RX ADMIN — CARBIDOPA AND LEVODOPA 1 TABLET: 25; 100 TABLET ORAL at 21:52

## 2021-08-04 RX ADMIN — BROMOCRIPTINE MESYLATE 2.5 MG: 2.5 TABLET ORAL at 15:14

## 2021-08-04 RX ADMIN — LISINOPRIL 5 MG: 5 TABLET ORAL at 09:30

## 2021-08-04 RX ADMIN — CARBIDOPA AND LEVODOPA 1 TABLET: 25; 100 TABLET ORAL at 09:30

## 2021-08-04 RX ADMIN — CEFTRIAXONE 1 G: 1 INJECTION, POWDER, FOR SOLUTION INTRAMUSCULAR; INTRAVENOUS at 09:30

## 2021-08-04 RX ADMIN — TUBERCULIN PURIFIED PROTEIN DERIVATIVE 5 UNITS: 5 INJECTION, SOLUTION INTRADERMAL at 21:53

## 2021-08-04 RX ADMIN — Medication 10 ML: at 15:09

## 2021-08-04 RX ADMIN — BROMOCRIPTINE MESYLATE 2.5 MG: 2.5 TABLET ORAL at 21:52

## 2021-08-04 NOTE — PROGRESS NOTES
08/04/21 1037   Dual Skin Pressure Injury Assessment   Dual Skin Pressure Injury Assessment WDL   Second Care Provider (Based on Facility Policy) Isai Avendano, RN   Skin Integumentary   Skin Integumentary (WDL) X    Pressure  Injury Documentation No Pressure Injury Noted-Pressure Ulcer Prevention Initiated   Skin Color Ecchymosis (comment)   Skin Condition/Temp Fragile;Flaky;Dry   Skin Integrity Tear;Excoriation  (skin tear to left elbow and left wrist.)   Turgor Epidermis thin w/ loss of subcut tissue   Wound Prevention and Protection Methods   Orientation of Wound Prevention Posterior   Location of Wound Prevention Sacrum/Coccyx   Dressing Present  Yes   Dressing Status Intact   Wound Offloading (Prevention Methods) Bed, pressure reduction mattress;Turning;Repositioning

## 2021-08-04 NOTE — CONSULTS
[facility] Hospitalist Consult   Admit Date:  2021 10:36 AM   Name:  Su Sumner   Age:  81 y.o. Sex:  male  :  1935   MRN:  [id number]     Presenting Complaint: Unresponsive    Reason(s) for Admission: Respiratory failure with hypoxia [facility]) [J96.91]     Hospitalists consulted by Arlene Townsend MD for: \"assumption of care\"    History of Presenting Illness:   Su Sumner is a 81 y.o. male with history of parkinson dementia, parkinson disease, peptic ulcer disease, anorexia who was admitted for acute metabolic encephalopathy. He presented as a transfer from facility. He had become acutely altered and was intubated. He was briefly in the ICU from -. On  he was extubated and stable for transfer to the floor where we resume care. Of note he has a DNR order. He he remains obtunded and neurology is consulted. Neurology is working under the diagnosis of rapidly progressive Parkinson disease and is ruling out other possible etiologies. He has chronic anemia and mild glucose impairment. He is requiring minimal medical support for these derangements. Nutrition has been consulted due to a BMI of 16 and the fact that he is currently on tube feeds through an NG tube. Review of Systems:  10 systems reviewed and negative except as noted in HPI. Assessment & Plan:   * 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[address]  Altered mental status since transfer from facility.  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  -neurology consult: considering EEG, MRI  -minimize sedating meds  -delirium precautions    Monoclonal paraproteinemia  -consider hematology/oncology consult    Tremor  -Carbidopa levodopa    Required emergency intubation  -Empiric antibiotics azithromycin, ceftriaxone    PUD (peptic ulcer disease)  -pantoprazole for omeprazole    GERD (gastroesophageal reflux disease)  -Famotidine    Severe protein-calorie malnutrition (HCC)  BMI 16.2  -nutrition consult   -on NG tube feed    Weight loss  -nutrition consult  -PT, OT, PPD, CM  -consider palliative consult      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    Murmur 1/18/2013    Nonspecific (abnormal) findings on radiological and other examination of other intrathoracic organs 9/4/2012    Osteopenia 7/11/2013    Peptic ulcer, unspecified site, unspecified as acute or chronic, without mention of hemorrhage, perforation, or obstruction 9/4/2012    Pulmonary infiltrate 1/18/2013    Respiratory failure with hypoxia ([facility] [address] 8/1/2021    Tremor 7/11/2013    Vertigo 7/11/2013    Vitamin D deficiency 7/11/2013    Weight loss 1/18/2013      Past Surgical History:   Procedure Laterality Date    HX BACK SURGERY  1990    herniated disc    HX CHOLECYSTECTOMY  2000    HX GASTRECTOMY  1960    due to DU    HX HEENT  1994    sinus    HX HERNIA REPAIR  1992    right inguinal    HX ORTHOPAEDIC  rt shoulder    1999      Allergies   Allergen Reactions    Codeine Nausea and Vomiting    Gadolinium-Containing Contrast Media Nausea and Vomiting      Social History     Tobacco Use    Smoking status: Never Smoker    Smokeless tobacco: Never Used   Substance Use Topics    Alcohol use: No     Alcohol/week: 0.0 standard drinks      Family History   Problem Relation Age of Onset    Arthritis-osteo Mother     Heart Disease Mother     Alzheimer Mother     Heart Disease Father     Arthritis-osteo Brother     Other Other         General Fam Hx.  of aneurysms    Heart Disease Sister         Valve replacement      Family history reviewed and noncontributory to patient's acute condition; no relevant family history unless otherwise noted above.   Immunization History   Administered Date(s) Administered    COVID-19, PFIZER, MRNA, LNP-S, PF, 30MCG/0.3ML DOSE 02/11/2021    Influenza High Dose Vaccine PF 09/01/2015    Influenza Vaccine 10/17/2013, 11/06/2014    Influenza Vaccine Split 10/11/2012    Pneumococcal Conjugate (PCV-13) 07/02/2015    Pneumococcal Polysaccharide (PPSV-23) 12/05/2013    TB Skin Test (PPD) Intradermal 01/18/2013    TDAP Vaccine 10/11/2012    Zoster Vaccine, Live 12/10/2014       Objective:     Patient Vitals for the past 24 hrs:   Temp Pulse Resp BP SpO2   08/04/21 1608 98.6 °F (37 °C) 87 28 135/82 (!) 89 %   08/04/21 1134 98.6 °F (37 °C) 79 20 (!) 143/75 91 %   08/04/21 1012 98.7 °F (37.1 °C) 90 20 (!) 148/71 94 %   08/04/21 0931  90 (!) 36 (!) 166/80 94 %   08/04/21 0902  81 26 (!) 159/74 93 %   08/04/21 0831  83 30 (!) 166/77 95 %   08/04/21 0802  80 21 (!) 165/81 95 %   08/04/21 0731  96 (!) 47 (!) 176/86 94 %   08/04/21 0702 98.4 °F (36.9 °C) 82 18 (!) 166/78 98 %   08/04/21 0637  77 (!) 44 (!) 163/69 96 %   08/04/21 0602  81 21 (!) 151/71 100 %   08/04/21 0535  77 22 (!) 157/73 98 %   08/04/21 0503  85 (!) 44 (!) 165/70 (!) 72 %   08/04/21 0431  81 (!) 35 (!) 151/76 91 %   08/04/21 0402  79 25 129/69 97 %   08/04/21 0400  97      08/04/21 0331  79 23 (!) 142/90 99 %   08/04/21 0303 99.1 °F (37.3 °C) 83 (!) 59 (!) 142/69 100 %   08/04/21 0232  81 (!) 40 (!) 148/69 90 %   08/04/21 0203  85 (!) 49 133/74 99 %   08/04/21 0131  (!) 122 (!) 32 (!) 147/69 94 %   08/04/21 0102  86 24 (!) 141/65 100 %   08/04/21 0031  86 25 (!) 141/70 97 %   08/04/21 0003  83 24 (!) 140/68 98 %   08/04/21 0000  83      08/03/21 2331  84 (!) 31 (!) 153/76 95 %   08/03/21 2302 98.1 °F (36.7 °C) 99 (!) 32 123/69 98 %   08/03/21 2231  97 30 134/70 93 %   08/03/21 2202  98 (!) 31 (!) 149/70 96 %   08/03/21 2131  98 28 (!) 154/85 95 %   08/03/21 2102  (!) 103 28 139/81 97 %   08/03/21 2031  100 (!) 37 137/80 99 %   08/03/21 2016  100 25 (!) 152/76 100 %   08/03/21 2002  (!) 102 23 (!) 171/83 100 %   08/03/21 2000  99      08/03/21 1931  99 21 (!) 163/101 98 %   08/03/21 1902 100.4 °F (38 °C) 98 26 (!) 159/70 100 %   08/03/21 1831  97 (!) 37 (!) 161/79 96 %   08/03/21 1801  97 (!) 32 (!) 154/74 97 %   08/03/21 1746  97 30  100 %     Oxygen Therapy  O2 Sat (%): (!) 89 % (08/04/21 1608)  Pulse via Oximetry: 77 beats per minute (08/04/21 0931)  O2 Device: Nasal cannula (08/04/21 0702)  Skin Assessment: Clean, dry, & intact (08/04/21 0390)  Skin Protection for O2 Device: No (08/04/21 4899)  Orientation: Bilateral (08/03/21 0701)  Location: Cheek (08/03/21 0701)  Interventions: Mouth Care (08/03/21 0701)  O2 Flow Rate (L/min): 2 l/min (08/04/21 0702)  FIO2 (%): 29 % (08/03/21 1001)    Estimated body mass index is 16.23 kg/m² as calculated from the following:    Height as of this encounter: 5' 10\" (1.778 m). Weight as of this encounter: 51.3 kg (113 lb 1.5 oz). Intake/Output Summary (Last 24 hours) at 8/4/2021 1737  Last data filed at 8/4/2021 1500  Gross per 24 hour   Intake 1011 ml   Output 600 ml   Net 411 ml       Physical Exam  Vitals and nursing note reviewed. Exam conducted with a chaperone present. Constitutional:       Appearance: Normal appearance. He is cachectic. Interventions: He is restrained. Nasal cannula in place. Comments: NG tube   HENT:      Head: Normocephalic. Mouth/Throat:      Mouth: Mucous membranes are dry. Eyes:      Extraocular Movements: Extraocular movements intact. Cardiovascular:      Rate and Rhythm: Normal rate and regular rhythm. Pulses:           Radial pulses are 2+ on the left side. Pulmonary:      Effort: Pulmonary effort is normal. No respiratory distress.       Comments: Transmitted upper airway sounds  Abdominal:      General: There is no distension. Tenderness: There is no abdominal tenderness. Musculoskeletal:         General: No deformity. Cervical back: No rigidity. Skin:     General: Skin is warm and dry. Neurological:      General: No focal deficit present. Mental Status: He is lethargic and confused. GCS: GCS eye subscore is 1. GCS verbal subscore is 1. GCS motor subscore is 5. Comments: tremor   Psychiatric:         Attention and Perception: He is inattentive. Behavior: Behavior is uncooperative and agitated. Cognition and Memory: Cognition is impaired.            Data Ordered and Personally Reviewed:    Last 24hr Labs:  Recent Results (from the past 24 hour(s))   MAGNESIUM    Collection Time: 08/04/21  3:46 AM   Result Value Ref Range    Magnesium 2.3 1.8 - 2.4 mg/dL   PHOSPHORUS    Collection Time: 08/04/21  3:46 AM   Result Value Ref Range    Phosphorus 2.1 (L) 2.3 - 3.7 MG/DL   CBC W/O DIFF    Collection Time: 08/04/21  3:46 AM   Result Value Ref Range    WBC 6.5 4.3 - 11.1 K/uL    RBC 2.89 (L) 4.23 - 5.6 M/uL    HGB 9.5 (L) 13.6 - 17.2 g/dL    HCT 27.9 (L) 41.1 - 50.3 %    MCV 96.5 79.6 - 97.8 FL    MCH 32.9 26.1 - 32.9 PG    MCHC 34.1 31.4 - 35.0 g/dL    RDW 13.2 11.9 - 14.6 %    PLATELET 940 253 - 754 K/uL    MPV 9.0 (L) 9.4 - 12.3 FL    ABSOLUTE NRBC 0.00 0.0 - 0.2 K/uL   METABOLIC PANEL, BASIC    Collection Time: 08/04/21  3:46 AM   Result Value Ref Range    Sodium 145 136 - 145 mmol/L    Potassium 3.6 3.5 - 5.1 mmol/L    Chloride 111 (H) 98 - 107 mmol/L    CO2 29 21 - 32 mmol/L    Anion gap 5 (L) 7 - 16 mmol/L    Glucose 153 (H) 65 - 100 mg/dL    BUN 23 8 - 23 MG/DL    Creatinine 0.68 (L) 0.8 - 1.5 MG/DL    GFR est AA >60 >60 ml/min/1.73m2    GFR est non-AA >60 >60 ml/min/1.73m2    Calcium 8.8 8.3 - 10.4 MG/DL   CK    Collection Time: 08/04/21  3:46 AM   Result Value Ref Range     (H) 21 - 215 U/L   GLUCOSE, POC    Collection Time: 08/04/21 11:36 AM   Result Value Ref Range Glucose (POC) 127 (H) 65 - 100 mg/dL    Performed by JoseCT        All Micro Results     Procedure Component Value Units Date/Time    CULTURE, BLOOD [[id number]] Collected: 08/01/21 1519    Order Status: Completed Specimen: Blood Updated: 08/04/21 0732     Special Requests: --        RIGHT  Antecubital       Culture result: NO GROWTH 3 DAYS       CULTURE, BLOOD [[id number]] Collected: 08/01/21 1622    Order Status: Completed Specimen: Blood Updated: 08/04/21 0732     Special Requests: --        RIGHT  ARM       Culture result: NO GROWTH 3 DAYS       COVID-19 RAPID TEST [[id number]] Collected: 08/01/21 1056    Order Status: Completed Specimen: Nasopharyngeal Updated: 08/01/21 1258     Specimen source NASAL        COVID-19 rapid test Not detected        Comment:      The specimen is NEGATIVE for SARS-CoV-2, the novel coronavirus associated with COVID-19. A negative result does not rule out COVID-19. This test has been authorized by the FDA under an Emergency Use Authorization (EUA) for use by authorized laboratories. Fact sheet for Healthcare Providers: Rowld[employer]co[employer]nz  Fact sheet for Patients: Row[employer]       Methodology: Isothermal Nucleic Acid Amplification               Other Studies:  XR ABD (KUB)    Result Date: 8/3/2021  Clinical history: NG tube placement verification. TECHNIQUE: AP supine abdominal x-ray. FINDINGS: Enteric tube courses below the diaphragm, with the tip in the mid stomach region. Bowel gas pattern appears within normal limits. Cholecystectomy clips are noted. Cardiac pacer wires are partially seen. 1. NG tube tip is in the stomach. XR ABD (KUB)    Result Date: 8/3/2021  Abdominal film for feeding GI tube placement. History: GI tube placement. Technique: Single AP view of the abdomen. NG tube tip is below the diaphragm.         Current Meds:  Current Facility-Administered Medications   Medication Dose Route Frequency    lisinopriL (PRINIVIL, ZESTRIL) tablet 5 mg  5 mg Per NG tube DAILY    cefTRIAXone (ROCEPHIN) 1 g in 0.9% sodium chloride (MBP/ADV) 50 mL MBP  1 g IntraVENous Q24H    famotidine (PF) (PEPCID) 20 mg in 0.9% sodium chloride 10 mL injection  20 mg IntraVENous Q24H    carbidopa-levodopa (SINEMET)  mg per tablet 1 Tablet  1 Tablet Per NG tube QHS    carbidopa-levodopa (SINEMET)  mg per tablet 1 Tablet  1 Tablet Per NG tube TID    bromocriptine (PARLODEL) tablet 2.5 mg  2.5 mg Per NG tube TID    sodium chloride (NS) flush 5-40 mL  5-40 mL IntraVENous Q8H    sodium chloride (NS) flush 5-40 mL  5-40 mL IntraVENous PRN    acetaminophen (TYLENOL) tablet 650 mg  650 mg Oral Q6H PRN    Or    acetaminophen (TYLENOL) suppository 650 mg  650 mg Rectal Q6H PRN    polyethylene glycol (MIRALAX) packet 17 g  17 g Oral DAILY PRN    ondansetron (ZOFRAN ODT) tablet 4 mg  4 mg Oral Q8H PRN    Or    ondansetron (ZOFRAN) injection 4 mg  4 mg IntraVENous Q6H PRN    enoxaparin (LOVENOX) injection 40 mg  40 mg SubCUTAneous DAILY    azithromycin (ZITHROMAX) 500 mg in 0.9% sodium chloride 250 mL (VIAL-MATE)  500 mg IntraVENous Q24H    NUTRITIONAL SUPPORT ELECTROLYTE PRN ORDERS   Does Not Apply PRN       Signed:  Sabina Oconnor MD

## 2021-08-04 NOTE — PROGRESS NOTES
Patient pulled out NG tube while being cleaned after incontinent episode. New NG tube inserted, placement verified via KUB. Tube feedings started.

## 2021-08-04 NOTE — PROGRESS NOTES
Occupational Therapy Note:    Participated in interdisciplinary rounds in ICU/CCU and chart reviewed. Patient is experiencing decrease in function from baseline. Patient would benefit from skilled acute therapy to increase independence with self care/ADLs, strength, endurance, and functional mobility. Recommend OT/PT consult when medically stable and MD agrees.     Thank you for your consideration,  Christiano Salas, OTR/L

## 2021-08-04 NOTE — PROGRESS NOTES
TRANSFER - IN REPORT:    Verbal report received from Harrison RN(name) on Armand Skiff  being received from ICU(unit) for routine progression of care      Report consisted of patients Situation, Background, Assessment and   Recommendations(SBAR). Information from the following report(s) SBAR was reviewed with the receiving nurse. Opportunity for questions and clarification was provided. Assessment completed upon patients arrival to unit and care assumed.

## 2021-08-04 NOTE — PROGRESS NOTES
Neurology Daily Progress Note     Assessment:     44-year-old man with worsening parkinsonism over the last 6 months. Neurological examination shows increased muscle tone, but not leadpipe rigidity, and myoclonus. CK and myoglobin are both elevated. 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 No evidence of potent neuroleptic administration, which could also cause this result. The patient remains poorly responsive. Will obtain EEG. If the patient becomes burton cooperative, we would like to repeat MRI. Plan:     Continue Sinemet     Continue bromocriptine 2.5 mg TID    Will trend CK    Management of Delirium     Non-pharmacological intervention  · Reorient the patient throughout the day  · Window open and lights on during the day. Lights off, television off, noises down during the night. If able, decrease nursing checks at night  · Therapies as often as possible  · Avoid restraints to the best of your ability   · Avoid sensory deprivation by using glasses and hearing aids, if applicable       Pharmacological intervention  · Replete electrolyte abnormalities and correct metabolic abnormalities  · Limit benzodiazepines, antihistamines, narcotics, anticholinergics. Preference towards Precedex for sedation over fentanyl and benzodiazepines/GABAa agonists. · For dangerous behavior/aggression to self or others can consider low dose Zyprexa or Seroquel if benefits outweigh risk. The patient should not be given typical antipsychotics such as Haldol or Geodon. · For persistent insomnia can use melatonin four hours prior to bedtime       Subjective: Interval history:    Patient is obtunded. Does not open eyes to voice or follow commands. Moves all extremities spontaneously. In restraints. History:    Lilian Sanchez is a 81 y.o. male who is being seen for PD and rigidity. Unable to obtain ROS due to AMS.         Objective:     Vitals:    08/04/21 0902 08/04/21 5702 08/04/21 1012 08/04/21 1134   BP: (!) 159/74 (!) 166/80 (!) 148/71 (!) 143/75   Pulse: 81 90 90 79   Resp: 26 (!) 36 20 20   Temp:   98.7 °F (37.1 °C) 98.6 °F (37 °C)   SpO2: 93% 94% 94% 91%   Weight:       Height:              Current Facility-Administered Medications:     lisinopriL (PRINIVIL, ZESTRIL) tablet 5 mg, 5 mg, Per NG tube, DAILY, Vignesh Yan MD, 5 mg at 08/04/21 0930    cefTRIAXone (ROCEPHIN) 1 g in 0.9% sodium chloride (MBP/ADV) 50 mL MBP, 1 g, IntraVENous, Q24H, Elias Saucedo MD, Last Rate: 100 mL/hr at 08/04/21 0930, 1 g at 08/04/21 0930    famotidine (PF) (PEPCID) 20 mg in 0.9% sodium chloride 10 mL injection, 20 mg, IntraVENous, Q24H, Elias Saucedo MD, 20 mg at 08/04/21 0930    carbidopa-levodopa (SINEMET)  mg per tablet 1 Tablet, 1 Tablet, Per NG tube, QHS, Too Stanford MD, 1 Tablet at 08/03/21 2202    carbidopa-levodopa (SINEMET)  mg per tablet 1 Tablet, 1 Tablet, Per NG tube, TID, Too Stanford MD, 1 Tablet at 08/04/21 0930    bromocriptine (PARLODEL) tablet 2.5 mg, 2.5 mg, Per NG tube, TID, Karlo Myers DO, 2.5 mg at 08/04/21 0930    sodium chloride (NS) flush 5-40 mL, 5-40 mL, IntraVENous, Q8H, Kianna Pettit NP, 10 mL at 08/04/21 0604    sodium chloride (NS) flush 5-40 mL, 5-40 mL, IntraVENous, PRN, Clare WITT NP    acetaminophen (TYLENOL) tablet 650 mg, 650 mg, Oral, Q6H PRN, 650 mg at 08/03/21 2202 **OR** acetaminophen (TYLENOL) suppository 650 mg, 650 mg, Rectal, Q6H PRN, Clare WITT, NP    polyethylene glycol (MIRALAX) packet 17 g, 17 g, Oral, DAILY PRN, Clare WITT, NP    ondansetron (ZOFRAN ODT) tablet 4 mg, 4 mg, Oral, Q8H PRN **OR** ondansetron (ZOFRAN) injection 4 mg, 4 mg, IntraVENous, Q6H PRN, Clare WITT, NP    enoxaparin (LOVENOX) injection 40 mg, 40 mg, SubCUTAneous, DAILY, Kianna Pettit, NP, 40 mg at 08/04/21 0930    azithromycin (ZITHROMAX) 500 mg in 0.9% sodium chloride 250 mL (VIAL-MATE), 500 mg, IntraVENous, Q24H, Brent Hatchet, MD, Last Rate: 250 mL/hr at 08/03/21 1455, 500 mg at 08/03/21 1455    NUTRITIONAL SUPPORT ELECTROLYTE PRN ORDERS, , Does Not Apply, PRN, Brent Hatchet, MD    Recent Results (from the past 12 hour(s))   MAGNESIUM    Collection Time: 08/04/21  3:46 AM   Result Value Ref Range    Magnesium 2.3 1.8 - 2.4 mg/dL   PHOSPHORUS    Collection Time: 08/04/21  3:46 AM   Result Value Ref Range    Phosphorus 2.1 (L) 2.3 - 3.7 MG/DL   CBC W/O DIFF    Collection Time: 08/04/21  3:46 AM   Result Value Ref Range    WBC 6.5 4.3 - 11.1 K/uL    RBC 2.89 (L) 4.23 - 5.6 M/uL    HGB 9.5 (L) 13.6 - 17.2 g/dL    HCT 27.9 (L) 41.1 - 50.3 %    MCV 96.5 79.6 - 97.8 FL    MCH 32.9 26.1 - 32.9 PG    MCHC 34.1 31.4 - 35.0 g/dL    RDW 13.2 11.9 - 14.6 %    PLATELET 708 794 - 605 K/uL    MPV 9.0 (L) 9.4 - 12.3 FL    ABSOLUTE NRBC 0.00 0.0 - 0.2 K/uL   METABOLIC PANEL, BASIC    Collection Time: 08/04/21  3:46 AM   Result Value Ref Range    Sodium 145 136 - 145 mmol/L    Potassium 3.6 3.5 - 5.1 mmol/L    Chloride 111 (H) 98 - 107 mmol/L    CO2 29 21 - 32 mmol/L    Anion gap 5 (L) 7 - 16 mmol/L    Glucose 153 (H) 65 - 100 mg/dL    BUN 23 8 - 23 MG/DL    Creatinine 0.68 (L) 0.8 - 1.5 MG/DL    GFR est AA >60 >60 ml/min/1.73m2    GFR est non-AA >60 >60 ml/min/1.73m2    Calcium 8.8 8.3 - 10.4 MG/DL   CK    Collection Time: 08/04/21  3:46 AM   Result Value Ref Range     (H) 21 - 215 U/L   GLUCOSE, POC    Collection Time: 08/04/21 11:36 AM   Result Value Ref Range    Glucose (POC) 127 (H) 65 - 100 mg/dL    Performed by Misael          Physical Exam:  General -elderly male. Intubated and agitated. HEENT - Normocephalic, atraumatic. Conjunctiva, tympanic membranes, and oropharynx are clear. Neck - Supple without masses, no bruits   Extremities - Peripheral pulses intact. No edema and no rashes.      Neurological examination -  Obtunded. Does not follow commands.   Language and speech cannot be assessed. On cranial nerve examination pupils are equal round and reactive to light. Could not participate in funduscopic examination, visual acuity, or visual field testing. Face is symmetric. Hearing is intact. Motor examination -increased muscle tone in all 4 limbs with prominent paratonia and likely some cogwheel rigidity. Superimposed myoclonus. He cannot follow commands for power testing but is able to lift all 4 limbs against gravity. Muscle stretch reflexes are diminished throughout.   He could not participate in sensory examination, cerebellar examination, gait and stance due to altered mental status.           Signed By: Tatiana Moyer NP     August 4, 2021

## 2021-08-04 NOTE — PROGRESS NOTES
Bedside and verbal shift change report received from  [address] [place] [address] (offgoing nurse). Report included the following information SBAR, Kardex, ED Summary, Intake/Output, Recent Results, Cardiac Rhythm V Paced and Dual Neuro Assessment.      Dual skin assessment completed at bedside: C/D/I (list pertinent skin assessment findings)    Dual verification of gtts completed (name of gtts verified): N/A

## 2021-08-04 NOTE — PROGRESS NOTES
TRANSFER - OUT REPORT:    Verbal report given to Richar Christy RN(name) on Diana Herman  being transferred to 619(unit) for routine progression of care       Report consisted of patients Situation, Background, Assessment and   Recommendations(SBAR). Information from the following report(s) SBAR, Kardex, ED Summary, Intake/Output, Recent Results and Cardiac Rhythm V Paced was reviewed with the receiving nurse. Lines:   Peripheral IV 08/01/21 Right;Upper Arm (Active)   Site Assessment Clean, dry, & intact 08/04/21 0702   Phlebitis Assessment 0 08/04/21 0702   Infiltration Assessment 0 08/04/21 0702   Dressing Status Clean, dry, & intact 08/04/21 0702   Dressing Type Transparent;Tape 08/04/21 0702   Hub Color/Line Status Patent; Flushed;Capped 08/04/21 0702   Alcohol Cap Used No 08/04/21 1778        Opportunity for questions and clarification was provided.       Patient transported with:   O2 @ 2 liters  Registered Nurse

## 2021-08-04 NOTE — PROGRESS NOTES
Critical Care Daily Progress Note: 8/4/2021    Diana Herman   Admission Date: 8/1/2021            Diana Herman is an 81yoM with history of bronchiectasis status post treatment for MAC, GERD, TIAs, hypertension, hyperlipidemia, coronary artery disease status post PCI x3,  status post pacemaker, PUD status post partial gastrectomy. 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 He has been under the care of Dr. Anjelica Schultz. He was admitted from [facility] on 8/1/2021 with altered mental status, poor p.o. intake & he was intubated in transit. Subjective:      Tolerated extubation yesterday, confused, non verbal    Current Facility-Administered Medications   Medication Dose Route Frequency    lisinopriL (PRINIVIL, ZESTRIL) tablet 5 mg  5 mg Oral DAILY    cefTRIAXone (ROCEPHIN) 1 g in 0.9% sodium chloride (MBP/ADV) 50 mL MBP  1 g IntraVENous Q24H    famotidine (PF) (PEPCID) 20 mg in 0.9% sodium chloride 10 mL injection  20 mg IntraVENous Q24H    carbidopa-levodopa (SINEMET)  mg per tablet 1 Tablet  1 Tablet Per NG tube QHS    carbidopa-levodopa (SINEMET)  mg per tablet 1 Tablet  1 Tablet Per NG tube TID    bromocriptine (PARLODEL) tablet 2.5 mg  2.5 mg Per NG tube TID    sodium chloride (NS) flush 5-40 mL  5-40 mL IntraVENous Q8H    sodium chloride (NS) flush 5-40 mL  5-40 mL IntraVENous PRN    acetaminophen (TYLENOL) tablet 650 mg  650 mg Oral Q6H PRN    Or    acetaminophen (TYLENOL) suppository 650 mg  650 mg Rectal Q6H PRN    polyethylene glycol (MIRALAX) packet 17 g  17 g Oral DAILY PRN    ondansetron (ZOFRAN ODT) tablet 4 mg  4 mg Oral Q8H PRN    Or    ondansetron (ZOFRAN) injection 4 mg  4 mg IntraVENous Q6H PRN    enoxaparin (LOVENOX) injection 40 mg  40 mg SubCUTAneous DAILY    azithromycin (ZITHROMAX) 500 mg in 0.9% sodium chloride 250 mL (VIAL-MATE)  500 mg IntraVENous Q24H    NUTRITIONAL SUPPORT ELECTROLYTE PRN ORDERS   Does Not Apply PRN       Review of Systems  Unobtainable due to patient status. Objective:     Vitals:    08/04/21 0702 08/04/21 0731 08/04/21 0802 08/04/21 0831   BP: (!) 166/78 (!) 176/86 (!) 165/81 (!) 166/77   Pulse: 82 96 80 83   Resp: 18 (!) 47 21 30   Temp: 98.4 °F (36.9 °C)      SpO2: 98% 94% 95% 95%   Weight:       Height:             Intake/Output Summary (Last 24 hours) at 8/4/2021 4885  Last data filed at 8/4/2021 0603  Gross per 24 hour   Intake 741 ml   Output 600 ml   Net 141 ml       Physical Exam:          Constitutional:  the patient is well developed and in no acute distress extubated  EENMT:  Sclera clear, pupils equal, oral mucosa moist  Respiratory: CTAB  Cardiovascular:  RRR without M,G,R  Gastrointestinal: soft and non-tender; with positive bowel sounds. Musculoskeletal: warm without cyanosis. There is no lower extremity edema. Skin:  no jaundice or rashes, no wounds   Neurologic: jerking movements diffusely, follows limited commands    LINES:  PIV  NGT    DRIPS:  none    CXR:     None today        LAB  UA negative  procal low  Blood cultures neg  No results for input(s): GLUCPOC in the last 72 hours. No lab exists for component: [place]   Recent Labs     08/04/21  0346 08/03/21  0406 08/02/21  0305   WBC 6.5 5.9 7.7   HGB 9.5* 10.3* 10.4*   HCT 27.9* 32.9* 30.8*    154 217     Recent Labs     08/04/21  0346 08/03/21  0406 08/02/21  0305 08/01/21  1519 08/01/21  1048    143 141   < > 139   K 3.6 4.2 3.8   < > 3.8   * 111* 107   < > 103   CO2 29 25 29   < > 29   * 116* 178*   < > 122*   BUN 23 26* 29*   < > 22   CREA 0.68* 0.60* 1.03   < > 0.90   MG 2.3  --  2.1  --  2.0   PHOS 2.1*  --  2.9  --   --    CA 8.8 8.2* 8.2*   < > 8.5   ALB  --   --   --   --  3.3   TBILI  --   --   --   --  0.9   ALT  --   --   --   --  15    < > = values in this interval not displayed. Recent Labs     08/03/21  0359 08/02/21  0402 08/01/21  1730   PHI 7.37 7.39 7.41   PCO2I 43.8 42.4 40.5   PO2I 119* 95 95   HCO3I 25.1 25.9 25.6     Recent Labs     08/01/21  1804 08/01/21  1048   LAC 1.6 1.6     Recent Labs     08/03/21  0359 08/02/21  0402 08/01/21  1730   PHI 7.37 7.39 7.41   PCO2I 43.8 42.4 40.5   PO2I 119* 95 95   HCO3I 25.1 25.9 25.6         Assessment:  (Medical Decision Making)     Hospital Problems  Date Reviewed: 5/23/2016        Codes Class Noted POA    * (Principal) Respiratory failure with hypoxia ([facility] ICD-10-CM: J96.91  ICD-9-CM: 518.81  8/1/2021 Unknown        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[facility] ICD-10-CM: 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 Day 4 ceftriaxone/azithro with low threshold to discontinue abx if cultures neg.  --appreciate neurology input  --DVT/GI ppx  --PT OT if able  Avoid CNS depressants    transfer to floor and engage hospitalist to assume care    More than 50% of the time documented was spent in face-to-face contact with the patient and in the care of the patient on the floor/unit where the patient is located.     Fide Summers MD no

## 2021-08-04 NOTE — PROGRESS NOTES
Raymundo Cristobal RN from [facility] called to get an update on Mr. Delgado. Verbal report was given to nurse.

## 2021-08-04 NOTE — PROGRESS NOTES
Interdisciplinary team rounds were held 8/4/2021 with the following team members:Care Management, Nursing, Nurse Practitioner, Occupational Therapy, Palliative Care, Pastoral Care, Pharmacy, Physician, Respiratory Therapy and Clinical Coordinator and the patient. Plan of care discussed. See clinical pathway and/or care plan for interventions and desired outcomes.

## 2021-08-04 NOTE — PROGRESS NOTES
This is a follow-up visit to the patient, providing a spiritual presence, emotional support and prayer. The patient appears to be resting.     Shimon Rios, [address] [facility], [facility]

## 2021-08-05 ENCOUNTER — APPOINTMENT (OUTPATIENT)
Dept: GENERAL RADIOLOGY | Age: 86
DRG: 056 | End: 2021-08-05
Attending: INTERNAL MEDICINE
Payer: MEDICARE

## 2021-08-05 LAB
ANION GAP SERPL CALC-SCNC: 4 MMOL/L (ref 7–16)
BUN SERPL-MCNC: 21 MG/DL (ref 8–23)
CALCIUM SERPL-MCNC: 8.6 MG/DL (ref 8.3–10.4)
CHLORIDE SERPL-SCNC: 111 MMOL/L (ref 98–107)
CK SERPL-CCNC: 494 U/L (ref 21–215)
CO2 SERPL-SCNC: 28 MMOL/L (ref 21–32)
CREAT SERPL-MCNC: 0.6 MG/DL (ref 0.8–1.5)
ERYTHROCYTE [DISTWIDTH] IN BLOOD BY AUTOMATED COUNT: 13.3 % (ref 11.9–14.6)
GLUCOSE BLD STRIP.AUTO-MCNC: 138 MG/DL (ref 65–100)
GLUCOSE BLD STRIP.AUTO-MCNC: 160 MG/DL (ref 65–100)
GLUCOSE BLD STRIP.AUTO-MCNC: 163 MG/DL (ref 65–100)
GLUCOSE SERPL-MCNC: 138 MG/DL (ref 65–100)
HCT VFR BLD AUTO: 31.6 % (ref 41.1–50.3)
HGB BLD-MCNC: 10.8 G/DL (ref 13.6–17.2)
MCH RBC QN AUTO: 32.5 PG (ref 26.1–32.9)
MCHC RBC AUTO-ENTMCNC: 34.2 G/DL (ref 31.4–35)
MCV RBC AUTO: 95.2 FL (ref 79.6–97.8)
NRBC # BLD: 0 K/UL (ref 0–0.2)
PHOSPHATE SERPL-MCNC: 1.8 MG/DL (ref 2.3–3.7)
PLATELET # BLD AUTO: 181 K/UL (ref 150–450)
PMV BLD AUTO: 10.6 FL (ref 9.4–12.3)
POTASSIUM SERPL-SCNC: 4 MMOL/L (ref 3.5–5.1)
RBC # BLD AUTO: 3.32 M/UL (ref 4.23–5.6)
SERVICE CMNT-IMP: ABNORMAL
SODIUM SERPL-SCNC: 143 MMOL/L (ref 138–145)
WBC # BLD AUTO: 7.1 K/UL (ref 4.3–11.1)

## 2021-08-05 PROCEDURE — 95816 EEG AWAKE AND DROWSY: CPT | Performed by: PSYCHIATRY & NEUROLOGY

## 2021-08-05 PROCEDURE — 99232 SBSQ HOSP IP/OBS MODERATE 35: CPT | Performed by: PSYCHIATRY & NEUROLOGY

## 2021-08-05 PROCEDURE — 84100 ASSAY OF PHOSPHORUS: CPT

## 2021-08-05 PROCEDURE — 74011250637 HC RX REV CODE- 250/637: Performed by: PSYCHIATRY & NEUROLOGY

## 2021-08-05 PROCEDURE — 74011250636 HC RX REV CODE- 250/636: Performed by: INTERNAL MEDICINE

## 2021-08-05 PROCEDURE — 82550 ASSAY OF CK (CPK): CPT

## 2021-08-05 PROCEDURE — 74018 RADEX ABDOMEN 1 VIEW: CPT

## 2021-08-05 PROCEDURE — 74011250637 HC RX REV CODE- 250/637: Performed by: FAMILY MEDICINE

## 2021-08-05 PROCEDURE — 82962 GLUCOSE BLOOD TEST: CPT

## 2021-08-05 PROCEDURE — 97167 OT EVAL HIGH COMPLEX 60 MIN: CPT

## 2021-08-05 PROCEDURE — 97163 PT EVAL HIGH COMPLEX 45 MIN: CPT

## 2021-08-05 PROCEDURE — APPSS45 APP SPLIT SHARED TIME 31-45 MINUTES: Performed by: NURSE PRACTITIONER

## 2021-08-05 PROCEDURE — 74011000250 HC RX REV CODE- 250: Performed by: INTERNAL MEDICINE

## 2021-08-05 PROCEDURE — 97530 THERAPEUTIC ACTIVITIES: CPT

## 2021-08-05 PROCEDURE — 36415 COLL VENOUS BLD VENIPUNCTURE: CPT

## 2021-08-05 PROCEDURE — 74011000258 HC RX REV CODE- 258: Performed by: INTERNAL MEDICINE

## 2021-08-05 PROCEDURE — 74011250637 HC RX REV CODE- 250/637: Performed by: INTERNAL MEDICINE

## 2021-08-05 PROCEDURE — 65270000029 HC RM PRIVATE

## 2021-08-05 PROCEDURE — 95816 EEG AWAKE AND DROWSY: CPT | Performed by: NURSE PRACTITIONER

## 2021-08-05 PROCEDURE — 85027 COMPLETE CBC AUTOMATED: CPT

## 2021-08-05 PROCEDURE — 80048 BASIC METABOLIC PNL TOTAL CA: CPT

## 2021-08-05 PROCEDURE — 97112 NEUROMUSCULAR REEDUCATION: CPT

## 2021-08-05 PROCEDURE — 74011250636 HC RX REV CODE- 250/636: Performed by: NURSE PRACTITIONER

## 2021-08-05 RX ADMIN — PANTOPRAZOLE SODIUM 40 MG: 40 TABLET, DELAYED RELEASE ORAL at 06:43

## 2021-08-05 RX ADMIN — CARBIDOPA AND LEVODOPA 1 TABLET: 25; 100 TABLET ORAL at 14:30

## 2021-08-05 RX ADMIN — Medication 10 ML: at 14:51

## 2021-08-05 RX ADMIN — BROMOCRIPTINE MESYLATE 2.5 MG: 2.5 TABLET ORAL at 15:43

## 2021-08-05 RX ADMIN — ENOXAPARIN SODIUM 40 MG: 40 INJECTION SUBCUTANEOUS at 09:29

## 2021-08-05 RX ADMIN — SODIUM PHOSPHATE, MONOBASIC, MONOHYDRATE: 276; 142 INJECTION, SOLUTION INTRAVENOUS at 15:42

## 2021-08-05 RX ADMIN — AZITHROMYCIN MONOHYDRATE 500 MG: 500 INJECTION, POWDER, LYOPHILIZED, FOR SOLUTION INTRAVENOUS at 14:30

## 2021-08-05 RX ADMIN — Medication 10 ML: at 05:59

## 2021-08-05 RX ADMIN — BROMOCRIPTINE MESYLATE 2.5 MG: 2.5 TABLET ORAL at 09:30

## 2021-08-05 RX ADMIN — CARBIDOPA AND LEVODOPA 1 TABLET: 25; 100 TABLET ORAL at 09:30

## 2021-08-05 RX ADMIN — CEFTRIAXONE 1 G: 1 INJECTION, POWDER, FOR SOLUTION INTRAMUSCULAR; INTRAVENOUS at 09:29

## 2021-08-05 RX ADMIN — LISINOPRIL 5 MG: 5 TABLET ORAL at 09:30

## 2021-08-05 NOTE — PROGRESS NOTES
Hourly rounds complete this shift. Patient alert and oriented to person. No new complaints at this time. Bed in low, locked position, call light and bedside table within reach. Patient in bed with bilateral wrist restraints and mitts. Patient has tube feeding going at 50mL/hr. Patient complained of lower abdominal pain. Patient was bladder scanned and got a reading of 498. Notified the doctor and received orders to straight cath patient. Preformed straight cath in sterile field and noted a void of 400mL. Prescribed medications crushced and given through NG tube. NG tube dislodged in AM, was reinserted and KUB confirmed placement. IV clean, dry, and intact. IV infusing. Will continue to monitor. Report given to night shift nurse.

## 2021-08-05 NOTE — PROGRESS NOTES
CM following patient's chart. Per OT notes, patient decreasing in function significantly. PT/OT consults recommended when patient medically stable and MD agrees. CM will continue to follow. Possible chance for STR instead of home. PPD already placed.

## 2021-08-05 NOTE — PROGRESS NOTES
Spoke with [address] [place] [address] from the [facility], [phone], ext. [phone]. Gave updates on patient status, diagnosis, and recent results.

## 2021-08-05 NOTE — PROGRESS NOTES
ACUTE OT GOALS:  (Developed with and agreed upon by patient and/or caregiver.)  1. Patient will be alert and follow simple one step commands 75% of the time to improve participation with ADL. 2. Patient will complete bed mobility and sitting edge of bed with supervision to improve positioning for ADL. 3. Patient will tolerate 23 minutes of OT treatment with 2-3 rest breaks to increase activity tolerance for ADLs. 4. Patient will complete grooming tasks with minimal assistance to improve independence with self-care. 5. Patient will attempt transfers to the chair/BSC within 3 visits to demonstrate advancement with functional transfers. Timeframe: 7 visits       OCCUPATIONAL THERAPY ASSESSMENT: Initial Assessment and Daily Note OT Treatment Day # 1    Vira Niño is a 81 y.o. male   PRIMARY DIAGNOSIS: 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[facility]  Respiratory failure with hypoxia ([facility] [J96.91]       Reason for Referral:    ICD-10: Treatment Diagnosis: Generalized Muscle Weakness (M62.81)  Other lack of cordination (R27.8)  [place] / [place] / Product Type: Medicare /   ASSESSMENT:     REHAB RECOMMENDATIONS:   Recommendation to date pending progress:  Setting:   Short-term Rehab  Equipment:    To Be Determined     PRIOR LEVEL OF FUNCTION:  (Prior to Hospitalization)  INITIAL/CURRENT LEVEL OF FUNCTION:  (Based on today's evaluation)   Bathing:   Unknown  Dressing:   Unknown  Feeding/Grooming:   Unknown  Toileting:   Unknown  Functional Mobility:   Unknown Bathing:   Total Assistance  Dressing:   Total Assistance  Feeding/Grooming:   Total Assistance  Toileting:   Total Assistance  Functional Mobility:   Unable to perform     ASSESSMENT:  Mr. Ligia Cameron presents from [facility] with respiratory failure with an intubation and brief stay in the ICU. Pt's hx is significant for atypical parkinsonism with recent increase in aggression and hallucinations.  Pt presents confused, restless, and agitated. Pt able to state name but doesn't communicate much other than stating, \"I need to get to the hospital!\" Pt re-oriented multiple times. Pt somewhat resistive with movement needing maximal-total assistance x 2 for bed mobility. Pt c/o lower abdominal pain in sitting. Later pt again restless and was able to almost sit himself up on the edge of the bed with CGA. Pt remains agitated in seated position and was assisted back to supine at end of session. It is currently unknown his prior level of functioning but pt is currently limited with all ADL/functional transfers. Pt will benefit from OT services to address stated goals and plan of care. SUBJECTIVE:   Mr. Rafael Zhu states, \"I need to get to the hospital.\"    SOCIAL HISTORY/LIVING ENVIRONMENT: Unknown.  Pt previously at [facility]; per chart pt had previously lived with spouse       OBJECTIVE:     PAIN: VITAL SIGNS: LINES/DRAINS:   Pre Treatment: Pain Screen  Pain Intervention(s) 1: Repositioned  Post Treatment: repositioned   IV and Nasogastric Tube  O2 Device: Nasal cannula     GROSS EVALUATION:  B UE Within Functional Limits Abnormal/ Functional Abnormal/ Non-Functional (see comments) Not Tested Comments:   AROM [] [x] [] []    PROM [] [x] [] []    Strength [] [x] [] [] Functional with observed activity   Balance [] [x] [] [] Poor in sitting    Posture [] [x] [] []    Sensation [] [] [] [x]    Coordination [] [x] [] []    Tone [] [x] [] []    Edema [] [] [] [x]    Activity Tolerance [] [x] [] []     [] [] [] []      COGNITION/  PERCEPTION: Intact Impaired   (see comments) Comments:   Orientation [] [x]    Vision [] []    Hearing [] []    Judgment/ Insight [] [x]    Attention [] [x]    Memory [] [x]    Command Following [] [x]    Emotional Regulation [] [x]     [] []      ACTIVITIES OF DAILY LIVING: I Mod I S SBA CGA Min Mod Max Total NT Comments   BASIC ADLs:              Bathing/ Showering [] [] [] [] [] [] [] [] [] [x]    Toileting [] [] [] [] [] [] [] [] [] [x]    Dressing [] [] [] [] [] [] [] [] [] [x]    Feeding [] [] [] [] [] [] [] [] [] [x]    Grooming [] [] [] [] [] [] [] [] [] [x]    Personal Device Care [] [] [] [] [] [] [] [] [] [x]    Functional Mobility [] [] [] [] [] [] [] [] [] [x]    I=Independent, Mod I=Modified Independent, S=Supervision, SBA=Standby Assistance, CGA=Contact Guard Assistance,   Min=Minimal Assistance, Mod=Moderate Assistance, Max=Maximal Assistance, Total=Total Assistance, NT=Not Tested    MOBILITY: I Mod I S SBA CGA Min Mod Max Total  NT x2 Comments:   Supine to sit [] [] [] [] [] [] [] [x] [] [] [x]    Sit to supine [] [] [] [] [] [] [] [x] [x] [] [x]    Sit to stand [] [] [] [] [] [] [] [] [] [x] []    Bed to chair [] [] [] [] [] [] [] [] [] [x] []    I=Independent, Mod I=Modified Independent, S=Supervision, SBA=Standby Assistance, CGA=Contact Guard Assistance,   Min=Minimal Assistance, Mod=Moderate Assistance, Max=Maximal Assistance, Total=Total Assistance, NT=Not Tested    [address] [facility] [address] [address] AM-PAC 6 Clicks   Daily Activity Inpatient Short Form        How much help from another person does the patient currently need. .. Total A Lot A Little None   1. Putting on and taking off regular lower body clothing? [x] 1   [] 2   [] 3   [] 4   2. Bathing (including washing, rinsing, drying)? [x] 1   [] 2   [] 3   [] 4   3. Toileting, which includes using toilet, bedpan or urinal?   [x] 1   [] 2   [] 3   [] 4   4. Putting on and taking off regular upper body clothing? [x] 1   [] 2   [] 3   [] 4   5. Taking care of personal grooming such as brushing teeth? [x] 1   [] 2   [] 3   [] 4   6. Eating meals? [x] 1   [] 2   [] 3   [] 4   © 2007, Trustees of [address] [place] [address] [address] [id number], under license to [employer]. All rights reserved     Score:  Initial: 6 Most Recent: X (Date: -- )   Interpretation of Tool:  Represents activities that are increasingly more difficult (i.e. Bed mobility, Transfers, Gait).     PLAN: FREQUENCY/DURATION: OT Plan of Care: 3 times/week for duration of hospital stay or until stated goals are met, whichever comes first.    PROBLEM LIST:   (Skilled intervention is medically necessary to address:)  1. Decreased ADL/Functional Activities  2. Decreased Activity Tolerance  3. Decreased AROM/PROM  4. Decreased Balance  5. Decreased Cognition  6. Decreased Coordination  7. Decreased Gait Ability  8. Decreased Strength  9. Decreased Transfer Abilities  10. Increased Pain   INTERVENTIONS PLANNED:   (Benefits and precautions of occupational therapy have been discussed with the patient.)  1. Self Care Training  2. Therapeutic Activity  3. Therapeutic Exercise/HEP  4. Neuromuscular Re-education  5. Education     TREATMENT:     EVALUATION: High Complexity : (Untimed Charge)    TREATMENT:   ( $$ Neuromuscular Re-Education: 8-22 mins   )  Co-Treatment PT/OT necessary due to patient's decreased overall endurance/tolerance levels, as well as need for high level skilled assistance to complete functional transfers/mobility and functional tasks  Neuromuscular Re-education (8 Minutes): Neuromuscular Re-education included Balance Training, Coordination training, Postural training and Sitting balance training to improve Balance, Coordination and Postural Control.     TREATMENT GRID:  N/A    AFTER TREATMENT POSITION/PRECAUTIONS:  Alarm Activated, Bed, Needs within reach, Restraints  and RN notified    INTERDISCIPLINARY COLLABORATION:  RN/PCT, PT/PTA and OT/NORRIS    TOTAL TREATMENT DURATION:  OT Patient Time In/Time Out  Time In: 1508  Time Out: [address] [facility], OT

## 2021-08-05 NOTE — PROGRESS NOTES
Patient rested well throughout this shift. No signs or symptoms of distress. No changes in status. Patient denies any further needs or pain at this time. Call light and persopnal belonings within reach. Will give bedside shift report to oncoming Nurse.

## 2021-08-05 NOTE — PROCEDURES
Routine EEG Report    Reason for EEG: Altered mental status      Current Facility-Administered Medications:     sodium phosphate 30 mmol in 0.9% sodium chloride 250 mL infusion, , IntraVENous, ONCE, Odilon Garcia DO, Last Rate: 43.3 mL/hr at 08/05/21 1542, New Bag at 08/05/21 1542    lisinopriL (PRINIVIL, ZESTRIL) tablet 5 mg, 5 mg, Per NG tube, DAILY, Vignesh Yan MD, 5 mg at 08/05/21 0930    pantoprazole (PROTONIX) tablet 40 mg, 40 mg, Oral, ACB, Mode Barry MD, 40 mg at 08/05/21 5798    tuberculin injection 5 Units, 5 Units, IntraDERMal, ONCE, Mode Barry MD, 5 Units at 08/04/21 2153    cefTRIAXone (ROCEPHIN) 1 g in 0.9% sodium chloride (MBP/ADV) 50 mL MBP, 1 g, IntraVENous, Q24H, Malinda Brower MD, Last Rate: 100 mL/hr at 08/05/21 0929, 1 g at 08/05/21 0929    carbidopa-levodopa (SINEMET)  mg per tablet 1 Tablet, 1 Tablet, Per NG tube, QHS, Coy Rico MD, 1 Tablet at 08/04/21 2152    carbidopa-levodopa (SINEMET)  mg per tablet 1 Tablet, 1 Tablet, Per NG tube, TID, Coy Rico MD, 1 Tablet at 08/05/21 1430    bromocriptine (PARLODEL) tablet 2.5 mg, 2.5 mg, Per NG tube, TID, Karlo Myers DO, 2.5 mg at 08/05/21 1543    sodium chloride (NS) flush 5-40 mL, 5-40 mL, IntraVENous, Q8H, Kianna Pettit NP, 10 mL at 08/05/21 1451    sodium chloride (NS) flush 5-40 mL, 5-40 mL, IntraVENous, PRN, Guanaco WITT, NP    acetaminophen (TYLENOL) tablet 650 mg, 650 mg, Oral, Q6H PRN, 650 mg at 08/03/21 2202 **OR** acetaminophen (TYLENOL) suppository 650 mg, 650 mg, Rectal, Q6H PRN, Guanaco Horner D, NP    polyethylene glycol (MIRALAX) packet 17 g, 17 g, Oral, DAILY PRN, Guanaco Horner D, NP    ondansetron (ZOFRAN ODT) tablet 4 mg, 4 mg, Oral, Q8H PRN **OR** ondansetron (ZOFRAN) injection 4 mg, 4 mg, IntraVENous, Q6H PRN, Guanaco Horner D, NP    enoxaparin (LOVENOX) injection 40 mg, 40 mg, SubCUTAneous, DAILY, Kianna Pettit, NP, 40 mg at 08/05/21 0929    NUTRITIONAL SUPPORT ELECTROLYTE PRN ORDERS, , Does Not Apply, PRN, Toyin Sanford MD      Technical Summary: This EEG was performed with a 32-channel digital EEG machine with electrodes placed according to the international 10-20 system of placement. Background:   During the maximal awake state no posterior dominant rhythm was seen. Anterior background consisted of low amplitude activity in the primarily in the delta range with occasional intermixed theta frequencies. Hyperventilation: Hyperventilation was not performed due to medical condition    Photic Stimulation: Photic stimulation was not performed due to medical condition    Sleep: None    Impression: The results of this EEG are abnormal.  There is slowing of the background consistent with a moderate to severe degree of encephalopathy, nonspecific in origin, but representing global CNS dysfunction. There are no lateralizing features or epileptiform discharges.

## 2021-08-05 NOTE — PROGRESS NOTES
Neurology Daily Progress Note     Assessment:     43-year-old man with worsening parkinsonism over the last 6 months. Neurological examination shows increased muscle tone, but not leadpipe rigidity, and myoclonus. CK and myoglobin were both elevated, CK is now improved. 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 No evidence of potent neuroleptic administration, which could also cause this result. The patient remains poorly responsive. Will obtain EEG. If the patient becomes burton cooperative, we would like to repeat MRI. Plan:     Continue Sinemet     Continue bromocriptine 2.5 mg TID    Management of Delirium     Non-pharmacological intervention  · Reorient the patient throughout the day  · Window open and lights on during the day. Lights off, television off, noises down during the night. If able, decrease nursing checks at night  · Therapies as often as possible  · Avoid restraints to the best of your ability   · Avoid sensory deprivation by using glasses and hearing aids, if applicable       Pharmacological intervention  · Replete electrolyte abnormalities and correct metabolic abnormalities  · Limit benzodiazepines, antihistamines, narcotics, anticholinergics. Preference towards Precedex for sedation over fentanyl and benzodiazepines/GABAa agonists. · For dangerous behavior/aggression to self or others can consider low dose Zyprexa or Seroquel if benefits outweigh risk. The patient should not be given typical antipsychotics such as Haldol or Geodon. · For persistent insomnia can use melatonin four hours prior to bedtime       Subjective: Interval history:    Patient is unchanged clinically. CK improved, 494. History:    Armand Skiff is a 81 y.o. male who is being seen for PD and rigidity. Unable to obtain ROS due to AMS.         Objective:     Vitals:    08/04/21 2245 08/05/21 0252 08/05/21 0400 08/05/21 0734   BP: 129/77 (!) 162/48  (!) 156/80   Pulse: 85 90  83   Resp: 28 28  20   Temp: 98.5 °F (36.9 °C) 97.7 °F (36.5 °C)  98.6 °F (37 °C)   SpO2: 93% 93%  91%   Weight:   122 lb 8 oz (55.6 kg)    Height:              Current Facility-Administered Medications:     lisinopriL (PRINIVIL, ZESTRIL) tablet 5 mg, 5 mg, Per NG tube, DAILY, Vignesh Yan MD, 5 mg at 08/05/21 0930    pantoprazole (PROTONIX) tablet 40 mg, 40 mg, Oral, ACB, Jenn Barry MD, 40 mg at 08/05/21 8340    tuberculin injection 5 Units, 5 Units, IntraDERMal, ONCE, Jenn Barry MD, 5 Units at 08/04/21 2153    cefTRIAXone (ROCEPHIN) 1 g in 0.9% sodium chloride (MBP/ADV) 50 mL MBP, 1 g, IntraVENous, Q24H, Vee Reed MD, Last Rate: 100 mL/hr at 08/05/21 0929, 1 g at 08/05/21 0929    carbidopa-levodopa (SINEMET)  mg per tablet 1 Tablet, 1 Tablet, Per NG tube, QHS, Mandeep Nolasco MD, 1 Tablet at 08/04/21 2152    carbidopa-levodopa (SINEMET)  mg per tablet 1 Tablet, 1 Tablet, Per NG tube, TID, Mandeep Nolasco MD, 1 Tablet at 08/05/21 0930    bromocriptine (PARLODEL) tablet 2.5 mg, 2.5 mg, Per NG tube, TID, Karlo Myers DO, 2.5 mg at 08/05/21 0930    sodium chloride (NS) flush 5-40 mL, 5-40 mL, IntraVENous, Q8H, Kianna Pettit NP, 10 mL at 08/05/21 0559    sodium chloride (NS) flush 5-40 mL, 5-40 mL, IntraVENous, PRN, Prachi WITT, NP    acetaminophen (TYLENOL) tablet 650 mg, 650 mg, Oral, Q6H PRN, 650 mg at 08/03/21 2202 **OR** acetaminophen (TYLENOL) suppository 650 mg, 650 mg, Rectal, Q6H PRN, Prachi Pencil D, NP    polyethylene glycol (MIRALAX) packet 17 g, 17 g, Oral, DAILY PRN, Prachi Pencil D, NP    ondansetron (ZOFRAN ODT) tablet 4 mg, 4 mg, Oral, Q8H PRN **OR** ondansetron (ZOFRAN) injection 4 mg, 4 mg, IntraVENous, Q6H PRN, Parchi Mcdermott D, NP    enoxaparin (LOVENOX) injection 40 mg, 40 mg, SubCUTAneous, DAILY, Kianna Pettit, NP, 40 mg at 08/05/21 0929    azithromycin (ZITHROMAX) 500 mg in 0.9% sodium chloride 250 mL (VIAL-MATE), 500 mg, IntraVENous, Q24H, Angelo Wallace MD, Last Rate: 250 mL/hr at 08/04/21 1509, 500 mg at 08/04/21 1509    NUTRITIONAL SUPPORT ELECTROLYTE PRN ORDERS, , Does Not Apply, PRN, Angelo Wallace MD    Recent Results (from the past 12 hour(s))   GLUCOSE, POC    Collection Time: 08/04/21 11:28 PM   Result Value Ref Range    Glucose (POC) 172 (H) 65 - 100 mg/dL    Performed by Norma    CBC W/O DIFF    Collection Time: 08/05/21  5:14 AM   Result Value Ref Range    WBC 7.1 4.3 - 11.1 K/uL    RBC 3.32 (L) 4.23 - 5.6 M/uL    HGB 10.8 (L) 13.6 - 17.2 g/dL    HCT 31.6 (L) 41.1 - 50.3 %    MCV 95.2 79.6 - 97.8 FL    MCH 32.5 26.1 - 32.9 PG    MCHC 34.2 31.4 - 35.0 g/dL    RDW 13.3 11.9 - 14.6 %    PLATELET 070 484 - 648 K/uL    MPV 10.6 9.4 - 12.3 FL    ABSOLUTE NRBC 0.00 0.0 - 0.2 K/uL   METABOLIC PANEL, BASIC    Collection Time: 08/05/21  5:14 AM   Result Value Ref Range    Sodium 143 138 - 145 mmol/L    Potassium 4.0 3.5 - 5.1 mmol/L    Chloride 111 (H) 98 - 107 mmol/L    CO2 28 21 - 32 mmol/L    Anion gap 4 (L) 7 - 16 mmol/L    Glucose 138 (H) 65 - 100 mg/dL    BUN 21 8 - 23 MG/DL    Creatinine 0.60 (L) 0.8 - 1.5 MG/DL    GFR est AA >60 >60 ml/min/1.73m2    GFR est non-AA >60 >60 ml/min/1.73m2    Calcium 8.6 8.3 - 10.4 MG/DL   GLUCOSE, POC    Collection Time: 08/05/21  5:28 AM   Result Value Ref Range    Glucose (POC) 138 (H) 65 - 100 mg/dL    Performed by [facility]    CK    Collection Time: 08/05/21 10:04 AM   Result Value Ref Range     (H) 21 - 215 U/L         Physical Exam:  General -elderly male. Obtunded. HEENT - Normocephalic, atraumatic. Conjunctiva, tympanic membranes, and oropharynx are clear. Neck - Supple without masses, no bruits   Extremities - Peripheral pulses intact. No edema and no rashes.      Neurological examination -  Obtunded. Does not follow commands. Language and speech cannot be assessed.   On cranial nerve examination pupils are equal round and reactive to light. Could not participate in funduscopic examination, visual acuity, or visual field testing. Face is symmetric. Hearing is intact. Motor examination -muscle tone is normal.  Superimposed myoclonus. He cannot follow commands for power testing but is able to lift all 4 limbs against gravity. Muscle stretch reflexes are diminished throughout.   He could not participate in sensory examination, cerebellar examination, gait and stance due to altered mental status.           Signed By: Macarena Maria NP     August 5, 2021

## 2021-08-05 NOTE — PROGRESS NOTES
Problem: Ventilator Management  Goal: *Adequate oxygenation and ventilation  Outcome: Progressing Towards Goal  Goal: *Patient maintains clear airway/free of aspiration  Outcome: Progressing Towards Goal  Goal: *Absence of infection signs and symptoms  Outcome: Progressing Towards Goal  Goal: *Normal spontaneous ventilation  Outcome: Progressing Towards Goal     Problem: Patient Education: Go to Patient Education Activity  Goal: Patient/Family Education  Outcome: Progressing Towards Goal     Problem: Pressure Injury - Risk of  Goal: *Prevention of pressure injury  Description: Document Chandler Scale and appropriate interventions in the flowsheet. Outcome: Progressing Towards Goal  Note: Pressure Injury Interventions:  Sensory Interventions: Assess changes in LOC    Moisture Interventions: Absorbent underpads    Activity Interventions: Assess need for specialty bed    Mobility Interventions: Pressure redistribution bed/mattress (bed type), Assess need for specialty bed, Turn and reposition approx.  every two hours(pillow and wedges)    Nutrition Interventions: Document food/fluid/supplement intake    Friction and Shear Interventions: Apply protective barrier, creams and emollients, HOB 30 degrees or less, Foam dressings/transparent film/skin sealants                Problem: Patient Education: Go to Patient Education Activity  Goal: Patient/Family Education  Outcome: Progressing Towards Goal     Problem: Delirium Treatment  Goal: *Level of consciousness restored to baseline  Outcome: Progressing Towards Goal  Goal: *Level of environmental perceptions restored to baseline  Outcome: Progressing Towards Goal  Goal: *Sensory perception restored to baseline  Outcome: Progressing Towards Goal  Goal: *Emotional stability restored to baseline  Outcome: Progressing Towards Goal  Goal: *Functional assessment restored to baseline  Outcome: Progressing Towards Goal  Goal: *Absence of falls  Outcome: Progressing Towards Goal  Goal: *Will remain free of delirium, CAM Score negative  Outcome: Progressing Towards Goal  Goal: *Cognitive status will be restored to baseline  Outcome: Progressing Towards Goal  Goal: Interventions  Outcome: Progressing Towards Goal     Problem: Patient Education: Go to Patient Education Activity  Goal: Patient/Family Education  Outcome: Progressing Towards Goal     Problem: Falls - Risk of  Goal: *Absence of Falls  Description: Document Beck Sprague Fall Risk and appropriate interventions in the flowsheet.   Outcome: Progressing Towards Goal  Note: Fall Risk Interventions:       Mentation Interventions: Adequate sleep, hydration, pain control, Room close to nurse's station    Medication Interventions: Bed/chair exit alarm    Elimination Interventions: Bed/chair exit alarm, Call light in reach              Problem: Patient Education: Go to Patient Education Activity  Goal: Patient/Family Education  Outcome: Progressing Towards Goal     Problem: Non-Violent Restraints  Goal: Removal from restraints as soon as assessed to be safe  Outcome: Progressing Towards Goal  Goal: No harm/injury to patient while restraints in use  Outcome: Progressing Towards Goal  Goal: Patient's dignity will be maintained  Outcome: Progressing Towards Goal  Goal: Patient Interventions  Outcome: Progressing Towards Goal

## 2021-08-05 NOTE — PROGRESS NOTES
RN messaged patient complaints of lower abd pain, bedside bladder scan shows 498.  Ordered IN and OUT cath  Renuka Elizondo MD

## 2021-08-05 NOTE — PROGRESS NOTES
Problem: Ventilator Management  Goal: *Adequate oxygenation and ventilation  Outcome: Progressing Towards Goal  Goal: *Patient maintains clear airway/free of aspiration  Outcome: Progressing Towards Goal  Goal: *Absence of infection signs and symptoms  Outcome: Progressing Towards Goal  Goal: *Normal spontaneous ventilation  Outcome: Progressing Towards Goal     Problem: Pressure Injury - Risk of  Goal: *Prevention of pressure injury  Description: Document Chandler Scale and appropriate interventions in the flowsheet.   Outcome: Progressing Towards Goal  Note: Pressure Injury Interventions:  Sensory Interventions: Assess changes in LOC    Moisture Interventions: Absorbent underpads    Activity Interventions: Assess need for specialty bed    Mobility Interventions: Pressure redistribution bed/mattress (bed type), PT/OT evaluation    Nutrition Interventions: Document food/fluid/supplement intake    Friction and Shear Interventions: Apply protective barrier, creams and emollients, HOB 30 degrees or less, Foam dressings/transparent film/skin sealants                Problem: Delirium Treatment  Goal: *Level of consciousness restored to baseline  Outcome: Progressing Towards Goal  Goal: *Level of environmental perceptions restored to baseline  Outcome: Progressing Towards Goal  Goal: *Sensory perception restored to baseline  Outcome: Progressing Towards Goal  Goal: *Emotional stability restored to baseline  Outcome: Progressing Towards Goal  Goal: *Functional assessment restored to baseline  Outcome: Progressing Towards Goal  Goal: *Absence of falls  Outcome: Progressing Towards Goal  Goal: *Will remain free of delirium, CAM Score negative  Outcome: Progressing Towards Goal  Goal: *Cognitive status will be restored to baseline  Outcome: Progressing Towards Goal  Goal: Interventions  Outcome: Progressing Towards Goal     Problem: Falls - Risk of  Goal: *Absence of Falls  Description: Document Gio Fall Risk and appropriate interventions in the flowsheet.   Outcome: Progressing Towards Goal     Problem: Non-Violent Restraints  Goal: Removal from restraints as soon as assessed to be safe  Outcome: Progressing Towards Goal  Goal: No harm/injury to patient while restraints in use  Outcome: Progressing Towards Goal  Goal: Patient's dignity will be maintained  Outcome: Progressing Towards Goal  Goal: Patient Interventions  Outcome: Progressing Towards Goal

## 2021-08-05 NOTE — PROGRESS NOTES
ACUTE PHYSICAL THERAPY GOALS:  (Developed with and agreed upon by patient and/or caregiver.)    (1.) Kenyatta Brunner  will move from supine to sit and sit to supine , scoot up and down and roll side to side with CONTACT GUARD ASSIST within 7 treatment day(s). (2.) Kenyatta Brunner will transfer from bed to chair and chair to bed with MINIMAL ASSIST using the least restrictive device within 7 treatment day(s). (3.) Kenyatta Brunner will ambulate with MINIMAL ASSIST for 50 feet with the least restrictive device within 7 treatment day(s). (4.) Kenyatta Brunner will perform standing static and dynamic balance activities x 15 minutes with CONTACT GUARD ASSIST to improve safety within 7 treatment day(s). (5.) Kenyatta Brunner will perform bilateral lower extremity exercises x 15 min for HEP with SUPERVISION to improve strength, endurance, and functional mobility within 7 treatment day(s).      PHYSICAL THERAPY ASSESSMENT: Initial Assessment and PM PT Treatment Day # 1      Kenyatta Brunner is a 81 y.o. male   PRIMARY DIAGNOSIS: 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[facility] [address]  Respiratory failure with hypoxia ([facility] [address] [J96.91]       Reason for Referral:    ICD-10: Treatment Diagnosis: Generalized Muscle Weakness (M62.81)  Other lack of cordination (R27.8)  Difficulty in walking, Not elsewhere classified (R26.2)  Other abnormalities of gait and mobility (R26.89)  [place]PAT[place] / [place] / Product Type: Medicare /     ASSESSMENT:     REHAB RECOMMENDATIONS:   Recommendation to date pending progress:  Setting:   Short-term Rehab  Equipment:    To Be Determined     PRIOR LEVEL OF FUNCTION:  (Prior to Hospitalization) INITIAL/CURRENT LEVEL OF FUNCTION:  (Most Recently Demonstrated)   Bed Mobility:   Modified Independent  Sit to Stand:   Modified Independent  Transfers:   Modified Independent  Gait/Mobility:   Modified Independent Bed Mobility:   Maximal Assistance x 2  Sit to Stand:   Not tested  Transfers:   Not tested  Gait/Mobility:   Not tested     ASSESSMENT:  Mr. Rafael Zhu is a 81year old M who presents w/ atypical parkinsonism and respiratory failure w/ hypoxia. This date pt performs mobility including bed mobility, supine to sit, sitting balance, and sit to supine with overall max a x 2. Pt was unable to provide a history of PLOF and had recently been admitted to [address] [place] [address] 3 days prior to hospital admission. Pt w/ fluctuating performance and required assistance levels with mobility. At times, pt required max a x 2, and at other times, he required min a x 2. Pt demonstrates AROM WFL and intact strength. Seems to have deficits in coordination, motor planning, and task sequencing. Pt presents as functioning below his baseline, with deficits in mobility including transfers, gait, balance, and activity tolerance. Pt will benefit from skilled therapy services to address stated deficits to promote return to highest level of function, independence, and safety. Will continue to follow. SUBJECTIVE:   Mr. Rafael Zhu presents restless. Requesting to go to hospital. Unable to reorient. SOCIAL HISTORY/LIVING ENVIRONMENT: From [address] [place] [address] as of 3 days ago. Per chart, lives w/ wife. 1 story home. [address]. Uses RW.      OBJECTIVE:     PAIN: VITAL SIGNS: LINES/DRAINS:   Pre Treatment: Pain Screen  Pain Scale 1: FLACC  Pain Intensity 1: 6  Post Treatment: 6/10 in lower abdominal region; RN notified   IV and Nasogastric Tube  O2 Device: Nasal cannula     GROSS EVALUATION:  B LE Within Functional Limits Abnormal/ Functional Abnormal/ Non-Functional (see comments) Not Tested Comments:   AROM [x] [] [] []    PROM [x] [] [] []    Strength [] [x] [] []    Balance [] [] [x] [] Sitting balance; unable to safely stand   Posture [] [x] [] []    Sensation [] [] [] [x]    Coordination [] [] [x] [] Tremors, decreased initiation, poor motor planning   Tone [] [] [] [x]    Edema [] [] [] [x]    Activity Tolerance [] [] [x] [] Only able to sit up in bed    [] [] [] []      COGNITION/  PERCEPTION: Intact Impaired   (see comments) Comments:   Orientation [] [x] Only able to say first name   Vision [] [x] Glasses    Hearing [] [x] Hard of hearing   Command Following [] [x] Unable to follow commands   Safety Awareness [] [x] Impaired     [] []      MOBILITY: I Mod I S SBA CGA Min Mod Max Total  NT x2 Comments:   Bed Mobility    Rolling [] [] [] [] [] [] [] [x] [] [] [x]    Supine to Sit [] [] [] [] [] [] [] [x] [] [] [x]    Scooting [] [] [] [] [] [] [] [x] [] [] [x]    Sit to Supine [] [] [] [] [] [] [] [x] [] [] [x]    Transfers    Sit to Stand [] [] [] [] [] [] [] [] [] [x] []    Bed to Chair [] [] [] [] [] [] [] [] [] [x] []    Stand to Sit [] [] [] [] [] [] [] [] [] [x] []    I=Independent, Mod I=Modified Independent, S=Supervision, SBA=Standby Assistance, CGA=Contact Guard Assistance,   Min=Minimal Assistance, Mod=Moderate Assistance, Max=Maximal Assistance, Total=Total Assistance, NT=Not Tested  GAIT: I Mod I S SBA CGA Min Mod Max Total  NT x2 Comments:   Level of Assistance [] [] [] [] [] [] [] [] [] [x] []    Distance N/A    DME N/A    Gait Quality N/A    Weightbearing Status N/A     I=Independent, Mod I=Modified Independent, S=Supervision, SBA=Standby Assistance, CGA=Contact Guard Assistance,   Min=Minimal Assistance, Mod=Moderate Assistance, Max=Maximal Assistance, Total=Total Assistance, NT=Not Tested    [facility] Form       How much difficulty does the patient currently have. .. Unable A Lot A Little None   1. Turning over in bed (including adjusting bedclothes, sheets and blankets)? [] 1   [x] 2   [] 3   [] 4   2. Sitting down on and standing up from a chair with arms ( e.g., wheelchair, bedside commode, etc.)   [] 1   [x] 2   [] 3   [] 4   3. Moving from lying on back to sitting on the side of the bed?    [] 1   [x] 2   [] 3   [] 4   How much help from another person does the patient currently need. .. Total A Lot A Little None   4. Moving to and from a bed to a chair (including a wheelchair)? [] 1   [x] 2   [] 3   [] 4   5. Need to walk in hospital room? [] 1   [x] 2   [] 3   [] 4   6. Climbing 3-5 steps with a railing? [x] 1   [] 2   [] 3   [] 4   © 2007, Trustees of [address] [place] [address] [address] [id number], under license to [employer]. All rights reserved     Score:  Initial: 11 Most Recent: X (Date: -- )    Interpretation of Tool:  Represents activities that are increasingly more difficult (i.e. Bed mobility, Transfers, Gait). PLAN:   FREQUENCY/DURATION: PT Plan of Care: 3 times/week for duration of hospital stay or until stated goals are met, whichever comes first.    PROBLEM LIST:   (Skilled intervention is medically necessary to address:)  1. Decreased ADL/Functional Activities  2. Decreased Activity Tolerance  3. Decreased Balance  4. Decreased Cognition  5. Decreased Coordination  6. Decreased Gait Ability  7. Decreased Strength  8. Decreased Transfer Abilities   INTERVENTIONS PLANNED:   (Benefits and precautions of physical therapy have been discussed with the patient.)  1. Therapeutic Activity  2. Therapeutic Exercise/HEP  3. Neuromuscular Re-education  4. Gait Training  5. Manual Therapy  6. Education     TREATMENT:     EVALUATION: High Complexity : (Untimed Charge)    TREATMENT:   (     )  Co-Treatment PT/OT necessary due to patient's decreased overall endurance/tolerance levels, as well as need for high level skilled assistance to complete functional transfers/mobility and functional tasks  Therapeutic Activity (12 Minutes): Therapeutic activity included Rolling, Supine to Sit, Sit to Supine, Scooting and Sitting balance  to improve functional Mobility, Strength, ROM and Activity tolerance.     TREATMENT GRID:  N/A    AFTER TREATMENT POSITION/PRECAUTIONS:  Alarm Activated, Bed, Needs within reach, Restraints  and RN notified    INTERDISCIPLINARY COLLABORATION:  RN/PCT, PT/PTA and OT/NORRIS    TOTAL TREATMENT DURATION:  PT Patient Time In/Time Out  Time In: 1507  Time Out: [facility], [place]

## 2021-08-05 NOTE — PROGRESS NOTES
Comprehensive Nutrition Assessment    Type and Reason for Visit: Reassess  Tube Feeding Management (Hospitalist)    Nutrition Recommendations/Plan:    Pt's mentation continues to preclude oral intake at this time. Parkinson's meds started late 8/2. · Enteral Nutrition:  · Contiue Jevity 1.5 via NGT at goal rate of 50 ml/hour. · Water flush 135 every 4 hours. · Vitamin and Mineral Supplement Therapy:  · Electrolyte management replacement protocol active. · No replacement indicated per BMP today. Adding on Phos to determine if replacement indicated. · Labs:   · EN labs: BMP daily, Mg and Phos MWF. · If mentation improves will consult SLP 8/6 to determine appropriateness for oral intake. Discussed with Mrs. Roberson plan for SLP involvement and PEG potential consideration in future if mentation continues to preclude oral intake. · Addendum:  · Phos resulted 1.8. Current K level exceeds enteral replacement guidelines. Discussed with Dr. Chago Pal, NaPhos replacement implemented per replacement protocol. Malnutrition Assessment:  Malnutrition Status: Moderate malnutrition  Context: Chronic illness  Findings of clinical characteristics of malnutrition:   Energy Intake:  Unable to assess  Weight Loss:  Mild weight loss (specify amount and time period) (3-8% wt loss over the past yr)     Body Fat Loss:  7 - Severe body fat loss, Buccal region, Triceps   Muscle Mass Loss:  1 - Mild muscle mass loss, Temples (temporalis), Calf (gastrocnemius), Thigh (quadriceps) (moderate)  Fluid Accumulation:  Unable to assess,     Strength:  Not performed     Nutrition Assessment:   Nutrition History: Per initial assessmnet: Reported not to be eating or drinking at [facility] times 2 days. Review of IM office records indicate ongoing struggles with intake and weight since January. Mrs. Roberson's reports his intake was his baseline low prior to admission at [facility]. Ate all meals.         Nutrition Background: [facility] remarkable for bronchiectasis, GERD, TIAs, HLD, HTN, tremor, partial gastrectomy. Admitted with respiratory failure w hypoxia, intubated by EMS, atypical parkinsonism. Daily Update:  Nursing working with pt for new IV access at RD visit. RN assisting had pt yesterday. Visit primarily with Mrs. Roberson at bedside. Pt briefly opens his eyes. She reports he was more awake earlier and talking to her. Discussed with Mr. Odalis Butler, roll of NGT for meds and feeding at this time. Abdominal Status (last documented): Flat abdomen with Active  bowel sounds. Last BM 08/05/21. Pertinent Medications: zithromax, perlodel, sinemet, rocephin, protonix  Pertinent Labs:   Lab Results   Component Value Date/Time    Sodium 143 08/05/2021 05:14 AM    Potassium 4.0 08/05/2021 05:14 AM    Chloride 111 (H) 08/05/2021 05:14 AM    CO2 28 08/05/2021 05:14 AM    Anion gap 4 (L) 08/05/2021 05:14 AM    Glucose 138 (H) 08/05/2021 05:14 AM    BUN 21 08/05/2021 05:14 AM    Creatinine 0.60 (L) 08/05/2021 05:14 AM    Calcium 8.6 08/05/2021 05:14 AM    Albumin 3.3 08/01/2021 10:48 AM    Magnesium 2.3 08/04/2021 03:46 AM    Phosphorus 2.1 (L) 08/04/2021 03:46 AM    Labs are relatively stable. Pt did not receive a replacement for phos 8/4. Decrease in phos is potentially reflective of refeeding syndrome, K and Mg without significant decline. Nutrition Related Findings:   NG placed 8/1. TF started w transfer to ICU 8/1, extubated 8/3. TF changed and restarted 8/3, progressed to goal 8/4 at 0603. Pt transferred from ICU 8/4, TF was resumed at initation rate s/p transfer.        Current Nutrition Therapies:  DIET NPO    Current Tube Feeding (TF) Orders:   · Feeding Route: Nasogastric  · Formula: Standard with fiber  · Schedule:Continuous    · Regimen: 50 ml/hr  · Additives/Modulars:  (None)  · Water Flushes: 135 ml/4 hours  · Current TF & Flush Orders Provides: increased to goal prior to IDT rounds  · Goal TF & Flush Orders Provides: 1650 kcal (100% estimated calorie needs), 78 grams protein (100% estimated protein needs) and 1646ml free fluid (1ml/kcal). Current Intake:   Average Meal Intake: NPO        Anthropometric Measures:  Height: 5' 10\" (177.8 cm)  Current Body Wt: 55.6 kg (122 lb 9.2 oz) (8/5), Weight source: Bed scale (new bed)  BMI: 17.6, Underweight (BMI less than 22) age over 72  Admission Body Weight: 125 lb (source not specified)  Ideal Body Weight (lbs) (Calculated): 166 lbs (75 kg), 75.3 %  Usual Body Wt:  (Wt range x 1 year per IM records 54 kg to 56.8 kg)        Edema: No data recorded   Estimated Daily Nutrient Needs:  Energy (kcal/day): 3579-1607 (Kcal/kg (30-35), Weight Used: Current (52.3 kg 8/3))  Protein (g/day): 63-78 (1.2-1.5 g/kg) Weight Used: (Current)  Fluid (ml/day):   (1 ml/kcal)    Nutrition Diagnosis:   · Inadequate oral intake related to cognitive or neurological impairment as evidenced by NPO or clear liquid status due to medical condition (NG for primary nutrition)    · Moderate malnutrition, In context of chronic illness related to cognitive or neurological impairment as evidenced by  (criteria identified in malnutrition assessment above)    Nutrition Interventions:   Food and/or Nutrient Delivery: Continue tube feeding     Coordination of Nutrition Care: Continue to monitor while inpatient, Interdisciplinary rounds  Plan of Care discussed with Katie Wilkerson and Hiawatha Sever, RNs, Dr. Dirk Everett. Goals:   Previous Goal Met: Goal(s) achieved  Active Goal: Maintain TF via NG at goal rate     Nutrition Monitoring and Evaluation:      Food/Nutrient Intake Outcomes: Enteral nutrition intake/tolerance  Physical Signs/Symptoms Outcomes: Biochemical data, GI status, Weight (Alertness for SLP eval )    Discharge Planning:     Too soon to determine    Howie Guzman RD, LDN on 8/5/2021 at 11:26 AM  Contact: [phone]

## 2021-08-05 NOTE — PROGRESS NOTES
Pt has not urinated all night bladder scan at 0400 showed 418 ml, notified hospitalist at [phone] and received order for straight cath, preformed cath at 0420 and got 440 ml out.

## 2021-08-05 NOTE — ASSESSMENT & PLAN NOTE
Altered mental status since transfer from facility.  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  -neurology consult: considering EEG, MRI  -minimize sedating meds  -delirium precautions

## 2021-08-06 LAB
ALBUMIN SERPL-MCNC: 2.4 G/DL (ref 3.2–4.6)
ALBUMIN/GLOB SERPL: 0.8 {RATIO} (ref 1.2–3.5)
ALP SERPL-CCNC: 68 U/L (ref 50–136)
ALT SERPL-CCNC: 17 U/L (ref 12–65)
ANION GAP SERPL CALC-SCNC: 5 MMOL/L (ref 7–16)
AST SERPL-CCNC: 54 U/L (ref 15–37)
BACTERIA SPEC CULT: NORMAL
BACTERIA SPEC CULT: NORMAL
BASOPHILS # BLD: 0 K/UL (ref 0–0.2)
BASOPHILS NFR BLD: 0 % (ref 0–2)
BILIRUB SERPL-MCNC: 0.5 MG/DL (ref 0.2–1.1)
BUN SERPL-MCNC: 21 MG/DL (ref 8–23)
CALCIUM SERPL-MCNC: 8.4 MG/DL (ref 8.3–10.4)
CHLORIDE SERPL-SCNC: 109 MMOL/L (ref 98–107)
CK SERPL-CCNC: 522 U/L (ref 21–215)
CO2 SERPL-SCNC: 30 MMOL/L (ref 21–32)
CREAT SERPL-MCNC: 0.53 MG/DL (ref 0.8–1.5)
DIFFERENTIAL METHOD BLD: ABNORMAL
EOSINOPHIL # BLD: 0 K/UL (ref 0–0.8)
EOSINOPHIL NFR BLD: 0 % (ref 0.5–7.8)
ERYTHROCYTE [DISTWIDTH] IN BLOOD BY AUTOMATED COUNT: 13.1 % (ref 11.9–14.6)
GLOBULIN SER CALC-MCNC: 3.2 G/DL (ref 2.3–3.5)
GLUCOSE BLD STRIP.AUTO-MCNC: 118 MG/DL (ref 65–100)
GLUCOSE BLD STRIP.AUTO-MCNC: 139 MG/DL (ref 65–100)
GLUCOSE SERPL-MCNC: 137 MG/DL (ref 65–100)
HCT VFR BLD AUTO: 27.4 % (ref 41.1–50.3)
HGB BLD-MCNC: 9.5 G/DL (ref 13.6–17.2)
IMM GRANULOCYTES # BLD AUTO: 0.1 K/UL (ref 0–0.5)
IMM GRANULOCYTES NFR BLD AUTO: 1 % (ref 0–5)
LYMPHOCYTES # BLD: 0.8 K/UL (ref 0.5–4.6)
LYMPHOCYTES NFR BLD: 12 % (ref 13–44)
MAGNESIUM SERPL-MCNC: 2.2 MG/DL (ref 1.8–2.4)
MCH RBC QN AUTO: 32.3 PG (ref 26.1–32.9)
MCHC RBC AUTO-ENTMCNC: 34.7 G/DL (ref 31.4–35)
MCV RBC AUTO: 93.2 FL (ref 79.6–97.8)
MM INDURATION POC: 0 MM (ref 0–5)
MM INDURATION POC: 0 MM (ref 0–5)
MONOCYTES # BLD: 0.5 K/UL (ref 0.1–1.3)
MONOCYTES NFR BLD: 8 % (ref 4–12)
NEUTS SEG # BLD: 5.2 K/UL (ref 1.7–8.2)
NEUTS SEG NFR BLD: 79 % (ref 43–78)
NRBC # BLD: 0 K/UL (ref 0–0.2)
PHOSPHATE SERPL-MCNC: 3.5 MG/DL (ref 2.3–3.7)
PLATELET # BLD AUTO: 237 K/UL (ref 150–450)
PMV BLD AUTO: 9.2 FL (ref 9.4–12.3)
POTASSIUM SERPL-SCNC: 3.3 MMOL/L (ref 3.5–5.1)
PPD POC: NEGATIVE NEGATIVE
PPD POC: NEGATIVE NEGATIVE
PROT SERPL-MCNC: 5.6 G/DL (ref 6.3–8.2)
RBC # BLD AUTO: 2.94 M/UL (ref 4.23–5.6)
SERVICE CMNT-IMP: ABNORMAL
SERVICE CMNT-IMP: ABNORMAL
SERVICE CMNT-IMP: NORMAL
SERVICE CMNT-IMP: NORMAL
SODIUM SERPL-SCNC: 144 MMOL/L (ref 138–145)
WBC # BLD AUTO: 6.6 K/UL (ref 4.3–11.1)

## 2021-08-06 PROCEDURE — 83735 ASSAY OF MAGNESIUM: CPT

## 2021-08-06 PROCEDURE — 74011250636 HC RX REV CODE- 250/636: Performed by: INTERNAL MEDICINE

## 2021-08-06 PROCEDURE — 82550 ASSAY OF CK (CPK): CPT

## 2021-08-06 PROCEDURE — 74011000258 HC RX REV CODE- 258: Performed by: INTERNAL MEDICINE

## 2021-08-06 PROCEDURE — 80053 COMPREHEN METABOLIC PANEL: CPT

## 2021-08-06 PROCEDURE — 74011250637 HC RX REV CODE- 250/637: Performed by: INTERNAL MEDICINE

## 2021-08-06 PROCEDURE — 74011250637 HC RX REV CODE- 250/637: Performed by: FAMILY MEDICINE

## 2021-08-06 PROCEDURE — 36415 COLL VENOUS BLD VENIPUNCTURE: CPT

## 2021-08-06 PROCEDURE — APPSS45 APP SPLIT SHARED TIME 31-45 MINUTES: Performed by: NURSE PRACTITIONER

## 2021-08-06 PROCEDURE — 82962 GLUCOSE BLOOD TEST: CPT

## 2021-08-06 PROCEDURE — 85025 COMPLETE CBC W/AUTO DIFF WBC: CPT

## 2021-08-06 PROCEDURE — 92610 EVALUATE SWALLOWING FUNCTION: CPT

## 2021-08-06 PROCEDURE — 74011250637 HC RX REV CODE- 250/637: Performed by: NURSE PRACTITIONER

## 2021-08-06 PROCEDURE — 74011250636 HC RX REV CODE- 250/636: Performed by: NURSE PRACTITIONER

## 2021-08-06 PROCEDURE — 99232 SBSQ HOSP IP/OBS MODERATE 35: CPT | Performed by: PSYCHIATRY & NEUROLOGY

## 2021-08-06 PROCEDURE — 84100 ASSAY OF PHOSPHORUS: CPT

## 2021-08-06 PROCEDURE — 65270000029 HC RM PRIVATE

## 2021-08-06 PROCEDURE — 51798 US URINE CAPACITY MEASURE: CPT

## 2021-08-06 PROCEDURE — 74011250637 HC RX REV CODE- 250/637: Performed by: PSYCHIATRY & NEUROLOGY

## 2021-08-06 RX ORDER — LISINOPRIL 5 MG/1
5 TABLET ORAL DAILY
Status: DISCONTINUED | OUTPATIENT
Start: 2021-08-07 | End: 2021-08-10 | Stop reason: HOSPADM

## 2021-08-06 RX ORDER — BROMOCRIPTINE MESYLATE 2.5 MG/1
2.5 TABLET ORAL 3 TIMES DAILY
Status: DISCONTINUED | OUTPATIENT
Start: 2021-08-06 | End: 2021-08-07

## 2021-08-06 RX ORDER — CARBIDOPA AND LEVODOPA 25; 100 MG/1; MG/1
1 TABLET ORAL 3 TIMES DAILY
Status: DISCONTINUED | OUTPATIENT
Start: 2021-08-06 | End: 2021-08-10 | Stop reason: HOSPADM

## 2021-08-06 RX ADMIN — BROMOCRIPTINE MESYLATE 2.5 MG: 2.5 TABLET ORAL at 16:58

## 2021-08-06 RX ADMIN — CEFTRIAXONE 1 G: 1 INJECTION, POWDER, FOR SOLUTION INTRAMUSCULAR; INTRAVENOUS at 08:52

## 2021-08-06 RX ADMIN — Medication 10 ML: at 22:00

## 2021-08-06 RX ADMIN — POTASSIUM BICARBONATE 40 MEQ: 782 TABLET, EFFERVESCENT ORAL at 11:39

## 2021-08-06 RX ADMIN — CARBIDOPA AND LEVODOPA 1 TABLET: 10; 100 TABLET ORAL at 01:28

## 2021-08-06 RX ADMIN — BROMOCRIPTINE MESYLATE 2.5 MG: 2.5 TABLET ORAL at 01:28

## 2021-08-06 RX ADMIN — PANTOPRAZOLE SODIUM 40 MG: 40 TABLET, DELAYED RELEASE ORAL at 06:38

## 2021-08-06 RX ADMIN — ACETAMINOPHEN 650 MG: 325 TABLET ORAL at 01:28

## 2021-08-06 RX ADMIN — CARBIDOPA AND LEVODOPA 1 TABLET: 25; 100 TABLET ORAL at 08:52

## 2021-08-06 RX ADMIN — Medication 10 ML: at 01:29

## 2021-08-06 RX ADMIN — ACETAMINOPHEN 650 MG: 325 TABLET ORAL at 14:25

## 2021-08-06 RX ADMIN — POTASSIUM BICARBONATE 40 MEQ: 782 TABLET, EFFERVESCENT ORAL at 17:10

## 2021-08-06 RX ADMIN — CARBIDOPA AND LEVODOPA 1 TABLET: 25; 100 TABLET ORAL at 01:28

## 2021-08-06 RX ADMIN — BROMOCRIPTINE MESYLATE 2.5 MG: 2.5 TABLET ORAL at 08:52

## 2021-08-06 RX ADMIN — ENOXAPARIN SODIUM 40 MG: 40 INJECTION SUBCUTANEOUS at 08:53

## 2021-08-06 RX ADMIN — Medication 10 ML: at 14:56

## 2021-08-06 RX ADMIN — BROMOCRIPTINE MESYLATE 2.5 MG: 2.5 TABLET ORAL at 21:27

## 2021-08-06 RX ADMIN — Medication 10 ML: at 06:01

## 2021-08-06 RX ADMIN — CARBIDOPA AND LEVODOPA 1 TABLET: 25; 100 TABLET ORAL at 14:00

## 2021-08-06 RX ADMIN — CARBIDOPA AND LEVODOPA 1 TABLET: 25; 100 TABLET ORAL at 21:28

## 2021-08-06 RX ADMIN — CARBIDOPA AND LEVODOPA 1 TABLET: 10; 100 TABLET ORAL at 21:28

## 2021-08-06 RX ADMIN — LISINOPRIL 5 MG: 5 TABLET ORAL at 08:52

## 2021-08-06 NOTE — PROGRESS NOTES
Comprehensive Nutrition Assessment    Type and Reason for Visit: Reassess  Tube Feeding Management (Hospitalist)    Nutrition Recommendations/Plan:   · Enteral Nutrition:  · Contiue Jevity 1.5 via NGT at goal rate of 50 ml/hour. · Water flush 135 ml every 4 hours.    · Vitamin and Mineral Supplement Therapy:  · Electrolyte management replacement protocol active.   · K replacement per protocol today. · Labs:   · EN labs: BMP daily, Mg and Phos MWF.   · Labs are indicative of refeeding syndrome. Meals and Snacks:  Continue current diet. NPO. SLP consulted d/t improved mentation to determine if pt is appropriate for oral intake. · Given refeeding syndrome, moderate malnutrition would continue feedings via enteral feedings until electrolyte replacement and pt able to meet 50% needs orally. · Will add Ensure Enlive TID with active diet per SLP. Malnutrition Assessment:  Malnutrition Status: Moderate malnutrition  Context: Chronic illness  Findings of clinical characteristics of malnutrition:   Energy Intake:  Unable to assess  Weight Loss:  Mild weight loss (specify amount and time period) (3-8% wt loss over the past yr)     Body Fat Loss:  7 - Severe body fat loss, Buccal region, Triceps   Muscle Mass Loss:  1 - Mild muscle mass loss, Temples (temporalis), Calf (gastrocnemius), Thigh (quadriceps) (moderate)  Fluid Accumulation:  Unable to assess     Strength:  Not performed     Nutrition Assessment:   Nutrition History: Per initial assessmnet: Reported not to be eating or drinking at [facility] times 2 days. Review of IM office records indicate ongoing struggles with intake and weight since January. Mrs. Duff reports his intake was his baseline low prior to admission at [facility]. Ate all meals. Nutrition Background: [facility] remarkable for bronchiectasis, GERD, TIAs, HLD, HTN, tremor, partial gastrectomy. Admitted with respiratory failure w hypoxia, intubated by EMS, atypical parkinsonism. Daily Update:  Pt is alert oriented to self and wife at RD visit. When asked how he's doing he states he's had better days. TF infusing without difficulty. Abdominal Status (last documented): Flat, Intact abdomen with Active  bowel sounds. Last BM 08/05/21. Pertinent Medications: sinemet, rocephin, protonix  Electrolyte Replacement: NaPhos IV replacement per protocol 8/5  Pertinent Labs:   Lab Results   Component Value Date/Time    Sodium 144 08/06/2021 06:18 AM    Potassium 3.3 (L) 08/06/2021 06:18 AM    Chloride 109 (H) 08/06/2021 06:18 AM    CO2 30 08/06/2021 06:18 AM    Anion gap 5 (L) 08/06/2021 06:18 AM    Glucose 137 (H) 08/06/2021 06:18 AM    BUN 21 08/06/2021 06:18 AM    Creatinine 0.53 (L) 08/06/2021 06:18 AM    Calcium 8.4 08/06/2021 06:18 AM    Albumin 2.4 (L) 08/06/2021 06:18 AM    Magnesium 2.2 08/06/2021 06:18 AM    Phosphorus 3.5 08/06/2021 06:18 AM   Labs have been remarkable for refeeding syndrome with prior depleted phos and current K depletion. Nutrition Related Findings:   NG placed 8/1. TF started w transfer to ICU 8/1, extubated 8/3. TF changed and restarted 8/3, progressed to goal 8/4 at 0603. Pt transferred from ICU 8/4, TF was resumed at initation rate s/p transfer. FT replaced 8/5, TF resumed. Current Nutrition Therapies:  DIET NPO  ADULT TUBE FEEDING Nasoenteric; Standard with Fiber; Delivery Method: Continuous; Continuous Initial Rate (mL/hr): 30; Continuous Advance Tube Feeding: Yes; Advancement Volume (mL/hr): 10; Advancement Frequency: Q 4 hours; Continuous Goal Rate (mL. ..   Current Tube Feeding (TF) Orders:   · Feeding Route: Nasogastric  · Formula: Standard with fiber  · Schedule:Continuous    · Regimen: 50 ml/hr  · Additives/Modulars:  (None)  · Water Flushes: 135 ml/4 hours  · Current TF & Flush Orders Provides: infusing at goal rate  · Goal TF & Flush Orders Provides: 1650 kcal (100% estimated calorie needs), 78 grams protein (100% estimated protein needs) and 1646ml free fluid (1ml/kcal). Current Intake:   Average Meal Intake: NPO        Anthropometric Measures:  Height: 5' 10\" (177.8 cm)  Current Body Wt: 55.1 kg (121 lb 7.6 oz) (8/6), Weight source: Bed scale  BMI: 17.4, Underweight (BMI less than 22) age over 72  Admission Body Weight: 125 lb (source not specified)  Ideal Body Weight (lbs) (Calculated): 166 lbs (75 kg), 75.3 %  Usual Body Wt:  (Wt range x 1 year per IM records 54 kg to 56.8 kg)        Edema: No data recorded   Estimated Daily Nutrient Needs:  Energy (kcal/day): 2804-2303 (Kcal/kg (30-35), Weight Used: Current (52.3 kg 8/3))  Protein (g/day): 63-78 (1.2-1.5 g/kg) Weight Used: (Current)  Fluid (ml/day):   (1 ml/kcal)    Nutrition Diagnosis:   · Inadequate oral intake related to cognitive or neurological impairment as evidenced by NPO or clear liquid status due to medical condition (NG for primary nutrition)    · Moderate malnutrition, In context of chronic illness related to cognitive or neurological impairment as evidenced by  (criteria identified in malnutrition assessment above)    Nutrition Interventions:   Food and/or Nutrient Delivery: Continue NPO, Continue tube feeding     Coordination of Nutrition Care: Continue to monitor while inpatient, Interdisciplinary rounds  Plan of Care discussed with Dr. Fay Arias, RN and Levell Maty, [address] Goals:   Previous Goal Met: Goal(s) achieved  Active Goal: Maintain TF via NG at goal rate until pt able to meet 50% of needs orally    Nutrition Monitoring and Evaluation:      Food/Nutrient Intake Outcomes: Enteral nutrition intake/tolerance, Diet advancement/tolerance  Physical Signs/Symptoms Outcomes: Biochemical data, GI status, Fluid status or edema, Weight    Discharge Planning:     Too soon to determine    Howie Christina RD, LDN on 8/6/2021 at 10:30 AM  Contact: [phone]

## 2021-08-06 NOTE — PROGRESS NOTES
Pt used restraints to pull himself down far enough to pull out NG tube. Notified Dr hsultz to replace.

## 2021-08-06 NOTE — PROGRESS NOTES
Problem: Ventilator Management  Goal: *Adequate oxygenation and ventilation  Outcome: Not Progressing Towards Goal  Goal: *Patient maintains clear airway/free of aspiration  Outcome: Not Progressing Towards Goal  Goal: *Absence of infection signs and symptoms  Outcome: Not Progressing Towards Goal  Goal: *Normal spontaneous ventilation  Outcome: Not Progressing Towards Goal     Problem: Patient Education: Go to Patient Education Activity  Goal: Patient/Family Education  Outcome: Not Progressing Towards Goal     Problem: Pressure Injury - Risk of  Goal: *Prevention of pressure injury  Description: Document Chandler Scale and appropriate interventions in the flowsheet.   Outcome: Not Progressing Towards Goal  Note: Pressure Injury Interventions:  Sensory Interventions: Assess changes in LOC    Moisture Interventions: Absorbent underpads    Activity Interventions: Assess need for specialty bed    Mobility Interventions: Pressure redistribution bed/mattress (bed type), PT/OT evaluation    Nutrition Interventions: Document food/fluid/supplement intake    Friction and Shear Interventions: Apply protective barrier, creams and emollients, HOB 30 degrees or less, Foam dressings/transparent film/skin sealants                Problem: Patient Education: Go to Patient Education Activity  Goal: Patient/Family Education  Outcome: Not Progressing Towards Goal     Problem: Delirium Treatment  Goal: *Level of consciousness restored to baseline  Outcome: Not Progressing Towards Goal  Goal: *Level of environmental perceptions restored to baseline  Outcome: Not Progressing Towards Goal  Goal: *Sensory perception restored to baseline  Outcome: Not Progressing Towards Goal  Goal: *Emotional stability restored to baseline  Outcome: Not Progressing Towards Goal  Goal: *Functional assessment restored to baseline  Outcome: Not Progressing Towards Goal  Goal: *Absence of falls  Outcome: Not Progressing Towards Goal  Goal: *Will remain free of delirium, CAM Score negative  Outcome: Not Progressing Towards Goal  Goal: *Cognitive status will be restored to baseline  Outcome: Not Progressing Towards Goal  Goal: Interventions  Outcome: Not Progressing Towards Goal     Problem: Patient Education: Go to Patient Education Activity  Goal: Patient/Family Education  Outcome: Not Progressing Towards Goal     Problem: Falls - Risk of  Goal: *Absence of Falls  Description: Document Froylan Haney Fall Risk and appropriate interventions in the flowsheet.   Outcome: Not Progressing Towards Goal  Note: Fall Risk Interventions:       Mentation Interventions: Adequate sleep, hydration, pain control    Medication Interventions: Bed/chair exit alarm, Patient to call before getting OOB, Teach patient to arise slowly    Elimination Interventions: Bed/chair exit alarm              Problem: Patient Education: Go to Patient Education Activity  Goal: Patient/Family Education  Outcome: Not Progressing Towards Goal     Problem: Non-Violent Restraints  Goal: Removal from restraints as soon as assessed to be safe  Outcome: Not Progressing Towards Goal  Goal: No harm/injury to patient while restraints in use  Outcome: Not Progressing Towards Goal  Goal: Patient's dignity will be maintained  Outcome: Not Progressing Towards Goal  Goal: Patient Interventions  Outcome: Not Progressing Towards Goal     Problem: Patient Education: Go to Patient Education Activity  Goal: Patient/Family Education  Outcome: Not Progressing Towards Goal     Problem: Patient Education: Go to Patient Education Activity  Goal: Patient/Family Education  Outcome: Not Progressing Towards Goal

## 2021-08-06 NOTE — PROGRESS NOTES
LTG: Patient will tolerate least restrictive diet without overt signs or symptoms of airway compromise. STG: Patient will tolerate minced and moist diet with thin liquids without overt signs or symptoms of airway compromise. STG: Patient will ongoing po trials to assess for diet advancement. SPEECH LANGUAGE PATHOLOGY: DYSPHAGIA- Initial Assessment    NAME/AGE/GENDER: Joesph Burns is a 81 y.o. male  DATE: 8/6/2021  PRIMARY DIAGNOSIS: Respiratory failure with hypoxia ([facility] [address] [J96.91]      ICD-10: Treatment Diagnosis: R13.12 Dysphagia, Oropharyngeal Phase    RECOMMENDATIONS   DIET:    Minced and Moist   Thin Liquids    MEDICATIONS: With liquid     ASPIRATION PRECAUTIONS  · Slow rate of intake  · Small bites/sips  · Upright at 90 degrees during meal     COMPENSATORY STRATEGIES/MODIFICATIONS  · Assistance with po intake     RECOMMENDATIONS for CONTINUED SPEECH THERAPY:   YES: Anticipate need for ongoing speech therapy during this hospitalization. ASSESSMENT   Patient presents with mild oropharyngeal dysphagia. Thin liquids consumed without s/sx of airway compromise. Slow, but functional oral prep with soft chewable textures. On one instance he did exhibit a cough and c/o food \"sticking in throat\" which is likely related to presence of NG tube. Recommend initiate minced and moist diet with thin liquids. Anticipate diet advancement once he is meeting nutritional needs and able to have NG tube removed. Will follow along for diet tolerance and po trials for potential diet advancement. EDUCATION:  · Recommendations discussed with Patient, Family, MD and RN  CONTINUATION OF SKILLED SERVICES/MEDICAL NECESSITY:   Patient is expected to demonstrate progress in  swallow strength, swallow timeliness, swallow function, diet tolerance and swallow safety in order to  improve swallow safety, work toward diet advancement and decrease aspiration risk.   REHABILITATION POTENTIAL FOR STATED GOALS: Good    PLAN FREQUENCY/DURATION: Continue to follow patient 3 times a week for duration of hospital stay to address above goals. - Recommendations for next treatment session: Next treatment session will address diet tolerance and po trials    SUBJECTIVE   Patient alert and asking for water. Wife at bedside. Oxygen Device: room air  Pain: Pain Scale 1: Numeric (0 - 10)  Pain Intensity 1: 0    History of Present Injury/Illness: Mr. 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 Marilu Chevy He also  has a past surgical history that includes hx orthopaedic (rt shoulder); hx heent (1994); hx gastrectomy (1960); hx cholecystectomy (2000); hx hernia repair (1992); and hx back surgery (1990). PRECAUTIONS/ALLERGIES: Codeine and Gadolinium-containing contrast media     Problem List:  (Impairments causing functional limitations):  1. Oropharyngeal dysphagia  2.      Previous Dysphagia: NONE REPORTED  Diet Prior to Evaluation: NPO with NG tube    Orientation:  Person  \"hospital\" with minimal assistance      OBJECTIVE   Oral Motor:   · Labial: No impairment  · Dentition: Intact  · Oral Hygiene: Adequate  · Lingual: No impairment  · COMMENTS: Eye droop on R side. Wife reports droop at baseline but feels it is more significant at this time. Dysphagia evaluation:   Patient consumed trials of thin liquids by tsp/straw, puree, and mixed consistencies. Intermittent double swallow after thin liquids which is likely related to sensation of NG tube. Voice remained clear. No cough or throat clear noted. No difficult with puree observed. Mildly prolonged oral prep with chewable textures. Single swallow palpated on first 2 trials, likely indicative of adequate pharyngeal clearance. After 3rd trial, delayed coughing and patient complaining of food \"stuck in my throat\". Suspect NG tube contributing to decreased clearance. Liquid wash and throat clearing effecting in resolving symptoms/senstation. Tool Used: Dysphagia Outcome and Severity Scale (TUYET)    Score Comments   Normal Diet  [] 7 With no strategies or extra time needed   Functional Swallow  [] 6 May have mild oral or pharyngeal delay   Mild Dysphagia  [] 5 Which may require one diet consistency restricted    Mild-Moderate Dysphagia  [] 4 With 1-2 diet consistencies restricted   Moderate Dysphagia  [] 3 With 2 or more diet consistencies restricted   Moderate-Severe Dysphagia  [] 2 With partial PO strategies (trials with ST only)   Severe Dysphagia  [] 1 With inability to tolerate any PO safely      Score:  Initial: 4 Most Recent: x (Date 08/06/21 )   Interpretation of Tool: The Dysphagia Outcome and Severity Scale (TUYET) is a simple, easy-to-use, 7-point scale developed to systematically rate the functional severity of dysphagia based on objective assessment and make recommendations for diet level, independence level, and type of nutrition. Current Medications:   No current facility-administered medications on file prior to encounter.      Current Outpatient Medications on File Prior to Encounter   Medication Sig Dispense Refill    atorvastatin (LIPITOR) 40 mg tablet Take 40 mg by mouth nightly.  carbidopa-levodopa (Sinemet)  mg per tablet Take 1 Tablet by mouth nightly.  carbidopa-levodopa (Sinemet)  mg per tablet Take 1 Tablet by mouth three (3) times daily.  lamoTRIgine (LaMICtal) 200 mg tablet Take 200 mg by mouth two (2) times a day.  loratadine 10 mg cap Take 10 mg by mouth daily.  omeprazole (PRILOSEC) 40 mg capsule Take 40 mg by mouth daily.  timolol (TIMOPTIC) 0.5 % ophthalmic solution Administer 1 Drop to both eyes daily.  rivaroxaban (XARELTO) 2.5 mg tablet Take 2.5 mg by mouth two (2) times daily (with meals).  OLANZapine (ZyPREXA) 2.5 mg tablet Take 2.5 mg by mouth every six (6) hours as needed (psychosis or agitation).  ondansetron hcl (ZOFRAN) 4 mg tablet Take 4 mg by mouth every eight (8) hours as needed for Nausea or Nausea or Vomiting.  lisinopril (PRINIVIL, ZESTRIL) 5 mg tablet Take 1 Tab by mouth daily. 90 Tab 3    meclizine (ANTIVERT) 12.5 mg tablet Take 1/2 to 1 tablet twice daily as needed for vertigo (Patient taking differently: Take 12.5 mg by mouth two (2) times a day. Take 1/2 to 1 tablet twice daily as needed for vertigo) 60 Tab 3    fluticasone (FLONASE) 50 mcg/actuation nasal spray PLACE 2 SPRAYS IN BOTH NOSTRILS DAILY. 1 Bottle 3    propranoloL (INDERAL) 10 mg tablet Take 30 mg by mouth two (2) times a day.  latanoprost (XALATAN) 0.005 % ophthalmic solution Administer 1 Drop to both eyes nightly.             INTERDISCIPLINARY COLLABORATION: MD YAQUELIN and RD    After treatment position/precautions:  · Upright in bed  · wife and Neurology at bedside  · RN, MD, and RD notified    Total Treatment Duration:   Time In: 1027  Time Out: [address] [facility], [facility], [facility] Center[facility]EN, [facility]-SLP  Speech Language Pathologist  Acute Rehabilitation Services  Contact: Gil

## 2021-08-06 NOTE — PROGRESS NOTES
Pt noted to void very little this shift bladder scan showed greater than 511. Notified Dr. Caryl Franco new orders received to in and out cath 500 ml obtained.

## 2021-08-06 NOTE — PROGRESS NOTES
Hourly rounds performed throughout shift, pt denies needs at this time. Pt is alert and oriented to self bed locked and call light/personal items within reach. Will continue to monitor and report day shift nurse.

## 2021-08-06 NOTE — PROGRESS NOTES
Neurology Daily Progress Note     Assessment:     26-year-old man with worsening parkinsonism over the last 6 months. Presented with increased muscle tone, but not leadpipe rigidity, and myoclonus. CK and myoglobin were both elevated, CK improving. 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 No evidence of potent neuroleptic administration, which could also cause this result. EEG showed slowing consistent with a moderate to severe degree of encephalopathy. He is more alert and oriented this morning, following commands, normal muscle tone. Continue Sinemet and bromocriptine. Plan:     Continue Sinemet     Continue bromocriptine 2.5 mg TID    Management of Delirium     Non-pharmacological intervention  · Reorient the patient throughout the day  · Window open and lights on during the day. Lights off, television off, noises down during the night. If able, decrease nursing checks at night  · Therapies as often as possible  · Avoid restraints to the best of your ability   · Avoid sensory deprivation by using glasses and hearing aids, if applicable       Pharmacological intervention  · Replete electrolyte abnormalities and correct metabolic abnormalities  · Limit benzodiazepines, antihistamines, narcotics, anticholinergics. Preference towards Precedex for sedation over fentanyl and benzodiazepines/GABAa agonists. · For dangerous behavior/aggression to self or others can consider low dose Zyprexa or Seroquel if benefits outweigh risk. The patient should not be given typical antipsychotics such as Haldol or Geodon. · For persistent insomnia can use melatonin four hours prior to bedtime       Subjective: Interval history:    Alert and oriented to person and place. Able to follow commands, moving all extremities against gravity. Bradyphrenic and bradykinetic. . Continue Sinemet and Bromocriptine.      History:    Ten Redding is a 81 y.o. male who is being seen for PD and rigidity. Unable to obtain ROS due to AMS.         Objective:     Vitals:    08/06/21 0408 08/06/21 0722 08/06/21 0758 08/06/21 1059   BP:  120/71  129/72   Pulse:  77 78 79   Resp:  20  20   Temp:  97.9 °F (36.6 °C)  97.9 °F (36.6 °C)   SpO2:  92%  92%   Weight: 121 lb 8 oz (55.1 kg)      Height:              Current Facility-Administered Medications:     potassium bicarb-citric acid (EFFER-K) tablet 40 mEq, 40 mEq, Per NG tube, BID, Estefani Garcia DO    lisinopriL (PRINIVIL, ZESTRIL) tablet 5 mg, 5 mg, Per NG tube, DAILY, Vignesh Yan MD, 5 mg at 08/06/21 0852    pantoprazole (PROTONIX) tablet 40 mg, 40 mg, Oral, ACB, Asha, Fide Adhikari MD, 40 mg at 08/06/21 3851    cefTRIAXone (ROCEPHIN) 1 g in 0.9% sodium chloride (MBP/ADV) 50 mL MBP, 1 g, IntraVENous, Q24H, Stephen Groves MD, Last Rate: 100 mL/hr at 08/06/21 0852, 1 g at 08/06/21 0852    carbidopa-levodopa (SINEMET)  mg per tablet 1 Tablet, 1 Tablet, Per NG tube, QHS, Marylee Son, MD, 1 Tablet at 08/06/21 0128    carbidopa-levodopa (SINEMET)  mg per tablet 1 Tablet, 1 Tablet, Per NG tube, TID, Marylee Son, MD, 1 Tablet at 08/06/21 1834    bromocriptine (PARLODEL) tablet 2.5 mg, 2.5 mg, Per NG tube, TID, Karlo Myers DO, 2.5 mg at 08/06/21 0852    sodium chloride (NS) flush 5-40 mL, 5-40 mL, IntraVENous, Q8H, Kianna Pettit, NP, 10 mL at 08/06/21 0601    sodium chloride (NS) flush 5-40 mL, 5-40 mL, IntraVENous, PRN, Wyatt Hipps D, NP    acetaminophen (TYLENOL) tablet 650 mg, 650 mg, Oral, Q6H PRN, 650 mg at 08/06/21 0128 **OR** acetaminophen (TYLENOL) suppository 650 mg, 650 mg, Rectal, Q6H PRN, Wyatt Hipps D, NP    polyethylene glycol (MIRALAX) packet 17 g, 17 g, Oral, DAILY PRN, Wyatt Hipps D, NP    ondansetron (ZOFRAN ODT) tablet 4 mg, 4 mg, Oral, Q8H PRN **OR** ondansetron (ZOFRAN) injection 4 mg, 4 mg, IntraVENous, Q6H PRN, Wyatt Hipps D, NP    enoxaparin (LOVENOX) injection 40 mg, 40 mg, SubCUTAneous, DAILY, Wyatt Hipps D, NP, 40 mg at 08/06/21 0853    NUTRITIONAL SUPPORT ELECTROLYTE PRN ORDERS, , Does Not Apply, PRN, Stephen Groves MD    Recent Results (from the past 12 hour(s))   GLUCOSE, POC    Collection Time: 08/06/21  1:37 AM   Result Value Ref Range    Glucose (POC) 118 (H) 65 - 100 mg/dL    Performed by [facility]    MAGNESIUM    Collection Time: 08/06/21  6:18 AM   Result Value Ref Range    Magnesium 2.2 1.8 - 2.4 mg/dL   PHOSPHORUS    Collection Time: 08/06/21  6:18 AM   Result Value Ref Range    Phosphorus 3.5 2.3 - 3.7 MG/DL   CBC WITH AUTOMATED DIFF    Collection Time: 08/06/21  6:18 AM   Result Value Ref Range    WBC 6.6 4.3 - 11.1 K/uL    RBC 2.94 (L) 4.23 - 5.6 M/uL    HGB 9.5 (L) 13.6 - 17.2 g/dL    HCT 27.4 (L) 41.1 - 50.3 %    MCV 93.2 79.6 - 97.8 FL    MCH 32.3 26.1 - 32.9 PG    MCHC 34.7 31.4 - 35.0 g/dL    RDW 13.1 11.9 - 14.6 %    PLATELET 427 750 - 345 K/uL    MPV 9.2 (L) 9.4 - 12.3 FL    ABSOLUTE NRBC 0.00 0.0 - 0.2 K/uL    DF AUTOMATED      NEUTROPHILS 79 (H) 43 - 78 %    LYMPHOCYTES 12 (L) 13 - 44 %    MONOCYTES 8 4.0 - 12.0 %    EOSINOPHILS 0 (L) 0.5 - 7.8 %    BASOPHILS 0 0.0 - 2.0 %    IMMATURE GRANULOCYTES 1 0.0 - 5.0 %    ABS. NEUTROPHILS 5.2 1.7 - 8.2 K/UL    ABS. LYMPHOCYTES 0.8 0.5 - 4.6 K/UL    ABS. MONOCYTES 0.5 0.1 - 1.3 K/UL    ABS. EOSINOPHILS 0.0 0.0 - 0.8 K/UL    ABS. BASOPHILS 0.0 0.0 - 0.2 K/UL    ABS. IMM.  GRANS. 0.1 0.0 - 0.5 K/UL   METABOLIC PANEL, COMPREHENSIVE    Collection Time: 08/06/21  6:18 AM   Result Value Ref Range    Sodium 144 138 - 145 mmol/L    Potassium 3.3 (L) 3.5 - 5.1 mmol/L    Chloride 109 (H) 98 - 107 mmol/L    CO2 30 21 - 32 mmol/L    Anion gap 5 (L) 7 - 16 mmol/L    Glucose 137 (H) 65 - 100 mg/dL    BUN 21 8 - 23 MG/DL    Creatinine 0.53 (L) 0.8 - 1.5 MG/DL    GFR est AA >60 >60 ml/min/1.73m2    GFR est non-AA >60 >60 ml/min/1.73m2    Calcium 8.4 8.3 - 10.4 MG/DL    Bilirubin, total 0.5 0.2 - 1.1 MG/DL    ALT (SGPT) 17 12 - 65 U/L    AST (SGOT) 54 (H) 15 - 37 U/L    Alk. phosphatase 68 50 - 136 U/L    Protein, total 5.6 (L) 6.3 - 8.2 g/dL    Albumin 2.4 (L) 3.2 - 4.6 g/dL    Globulin 3.2 2.3 - 3.5 g/dL    A-G Ratio 0.8 (L) 1.2 - 3.5     CK    Collection Time: 08/06/21  6:18 AM   Result Value Ref Range     (H) 21 - 215 U/L   GLUCOSE, POC    Collection Time: 08/06/21  7:01 AM   Result Value Ref Range    Glucose (POC) 139 (H) 65 - 100 mg/dL    Performed by [facility]          Physical Exam:  General -elderly male. HEENT - Normocephalic, atraumatic. Conjunctiva, tympanic membranes, and oropharynx are clear. Neck - Supple without masses, no bruits   Extremities - Peripheral pulses intact. No edema and no rashes.      Neurological examination - Comprehension, attention, memory and reasoning are impaired. Language and speech are bradyphrenic. On cranial nerve examination, (II, III, IV, VI) pupils are equal, round, and reactive to light. Visual acuity is adequate. Visual fields are full to finger confrontation. Extraocular motility is normal. (V, VII) Face is symmetric and sensation is intact to light touch. (VIII) Hearing is intact. (IX, X) Palate and uvula elevate symmetrically. Voice is normal. (XI) Shoulder shrug is strong and equal bilaterally. (XII)Tongue is midline. Motor examination - There is normal muscle tone and bulk. Moves all extremities against gravity and to commands. Muscle stretch reflexes 1+ throughout. Plantar response is flexor. Sensation is intact to light touch, pinprick in all extremities.  Unable to assess cerebellar examination or gait/stance.       Signed By: ARIEL Conley     August 6, 2021

## 2021-08-06 NOTE — PROGRESS NOTES
Hospitalist Progress Note   Admit Date:  2021 10:36 AM   Name:  Fahad Sarabia   Age:  81 y.o. Sex:  male  :  1935   MRN:  [id number]     Presenting Complaint: Unresponsive    Reason(s) for Admission: Respiratory failure with hypoxia [facility] Course & Interval History:     Fahad Sarabia is a 81 y.o. male with history of parkinson dementia, parkinson disease, peptic ulcer disease, anorexia who was admitted for acute metabolic encephalopathy. He presented as a transfer from facility. He had become acutely altered and was intubated. He was briefly in the ICU from -. On  he was extubated and stable for transfer to the floor where we resume care. Of note he has a DNR order. He he remains obtunded and neurology is consulted. Neurology is working under the diagnosis of rapidly progressive Parkinson disease and is ruling out other possible etiologies. He has chronic anemia and mild glucose impairment. He is requiring minimal medical support for these derangements. Nutrition has been consulted due to a BMI of 16 and the fact that he is currently on tube feeds through an NG tube. Subjective (21):  Patient getting prepped for EEG. Wife at bedside. Wife appears positive that patient condition is improving. Is more responsive to her today. Assessment & Plan:   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[address]  Altered mental status since transfer from facility.  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  Continue bromocriptine 2.5mg tid as per neurology  Continue sinemet     Monoclonal paraproteinemia  -consider hematology/oncology consult     Tremor  -Carbidopa levodopa     Required emergency intubation  -Empiric antibiotics azithromycin, ceftriaxone atbx D5/7     PUD (peptic ulcer disease)  -pantoprazole for omeprazole     GERD (gastroesophageal reflux disease)  -Famotidine     Severe protein-calorie malnutrition ([facility]  BMI 16.2  -nutrition consult   -on NG tube feed  - consideration for Speech eval tomorrow 8/6 if mentation improved enough for pt to cooperate    Hypophosphetemia  - replete as per nutrition replacement guidelines.      Weight loss  -nutrition consult  -PT, OT, PPD, CM  -If patient fails speech trials as above - would consult palliative care     Dispo/Discharge Planning:  Pending, prior to stay at [facility] was living with wife and using RW for ambulation. Diet:  DIET NPO  ADULT TUBE FEEDING Nasoenteric; Standard with Fiber; Delivery Method: Continuous; Continuous Initial Rate (mL/hr): 30; Continuous Advance Tube Feeding: Yes; Advancement Volume (mL/hr): 10; Advancement Frequency: Q 4 hours; Continuous Goal Rate (mL. ..   DVT PPx: lovenox    Code status: DNR    Active Hospital Problems    Diagnosis Date Noted    Required emergency intubation 08/04/2021     Priority: 4 - Four    Severe protein-calorie malnutrition ([facility] 08/04/2021     Priority: 6 - Six    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[facility] 06/24/2021    Tremor 07/11/2013    Weight loss 01/18/2013    Monoclonal paraproteinemia 09/04/2012    GERD (gastroesophageal reflux disease) 09/04/2012    PUD (peptic ulcer disease) 09/04/2012       Objective:     Patient Vitals for the past 24 hrs:   Temp Pulse Resp BP SpO2   08/05/21 1935  (!) 111 22 (!) 153/69 91 %   08/05/21 1804 98.5 °F (36.9 °C) 74 20 121/79 91 %   08/05/21 1109 98.5 °F (36.9 °C) 78 20 (!) 143/78 93 %   08/05/21 0734 98.6 °F (37 °C) 83 20 (!) 156/80 91 %   08/05/21 0252 97.7 °F (36.5 °C) 90 28 (!) 162/48 93 %   08/04/21 2245 98.5 °F (36.9 °C) 85 28 129/77 93 %     Oxygen Therapy  O2 Sat (%): 91 % (08/05/21 1935)  Pulse via Oximetry: 77 beats per minute (08/04/21 0931)  O2 Device: Nasal cannula (08/05/21 0707)  Skin Assessment: Clean, dry, & intact (08/05/21 0801)  Skin Protection for O2 Device: No (08/04/21 1700)  Orientation: Bilateral (08/03/21 0701)  Location: Cheek (08/03/21 0701)  Interventions: Mouth Care (08/03/21 0701)  O2 Flow Rate (L/min): 2 l/min (08/05/21 0707)  FIO2 (%): 29 % (08/03/21 1001)    Estimated body mass index is 17.58 kg/m² as calculated from the following:    Height as of this encounter: 5' 10\" (1.778 m). Weight as of this encounter: 55.6 kg (122 lb 8 oz). Intake/Output Summary (Last 24 hours) at 8/5/2021 2035  Last data filed at 8/5/2021 1818  Gross per 24 hour   Intake 795 ml   Output 842 ml   Net -47 ml         Physical Exam:     General:    malnourished. No overt distress   Head:  Normocephalic, atraumatic NGT in place  Eyes:  Sclerae appear normal.  Pupils equally round. Sunken orbits  HENT:  Nares appear normal, no drainage. Moist mucous membranes  Neck:  No restricted ROM. Trachea midline  CV:   RRR. No m/r/g. No JVD  Lungs:   CTAB. No wheezing, rhonchi, or rales. Appears even, unlabored  Abdomen: Bowel sounds present. Soft, nontender, nondistended. Extremities: Warm and dry. No cyanosis or clubbing. No edema. Skin:     No rashes. Normal turgor. Normal coloration  Neuro:  Cranial nerves II-XII grossly intact. Sensation intact  Psych:  Normal mood and affect.   Alert and oriented x3    Data Ordered and Personally Reviewed:    Last 24hr Labs:  Recent Results (from the past 24 hour(s))   GLUCOSE, POC    Collection Time: 08/04/21 11:28 PM   Result Value Ref Range    Glucose (POC) 172 (H) 65 - 100 mg/dL    Performed by [facility]    CBC W/O DIFF    Collection Time: 08/05/21  5:14 AM   Result Value Ref Range    WBC 7.1 4.3 - 11.1 K/uL    RBC 3.32 (L) 4.23 - 5.6 M/uL    HGB 10.8 (L) 13.6 - 17.2 g/dL    HCT 31.6 (L) 41.1 - 50.3 %    MCV 95.2 79.6 - 97.8 FL    MCH 32.5 26.1 - 32.9 PG    MCHC 34.2 31.4 - 35.0 g/dL    RDW 13.3 11.9 - 14.6 %    PLATELET 476 215 - 382 K/uL    MPV 10.6 9.4 - 12.3 FL    ABSOLUTE NRBC 0.00 0.0 - 0.2 K/uL   METABOLIC PANEL, BASIC    Collection Time: 08/05/21  5:14 AM   Result Value Ref Range    Sodium 143 138 - 145 mmol/L Potassium 4.0 3.5 - 5.1 mmol/L    Chloride 111 (H) 98 - 107 mmol/L    CO2 28 21 - 32 mmol/L    Anion gap 4 (L) 7 - 16 mmol/L    Glucose 138 (H) 65 - 100 mg/dL    BUN 21 8 - 23 MG/DL    Creatinine 0.60 (L) 0.8 - 1.5 MG/DL    GFR est AA >60 >60 ml/min/1.73m2    GFR est non-AA >60 >60 ml/min/1.73m2    Calcium 8.6 8.3 - 10.4 MG/DL   PHOSPHORUS    Collection Time: 08/05/21  5:14 AM   Result Value Ref Range    Phosphorus 1.8 (L) 2.3 - 3.7 MG/DL   GLUCOSE, POC    Collection Time: 08/05/21  5:28 AM   Result Value Ref Range    Glucose (POC) 138 (H) 65 - 100 mg/dL    Performed by [facility]    CK    Collection Time: 08/05/21 10:04 AM   Result Value Ref Range     (H) 21 - 215 U/L   GLUCOSE, POC    Collection Time: 08/05/21 11:04 AM   Result Value Ref Range    Glucose (POC) 163 (H) 65 - 100 mg/dL    Performed by [facility] GLUCOSE, POC    Collection Time: 08/05/21  5:46 PM   Result Value Ref Range    Glucose (POC) 160 (H) 65 - 100 mg/dL    Performed by [facility]        All Micro Results     Procedure Component Value Units Date/Time    CULTURE, BLOOD [[id number]] Collected: 08/01/21 1622    Order Status: Completed Specimen: Blood Updated: 08/05/21 0928     Special Requests: --        RIGHT  ARM       Culture result: NO GROWTH 4 DAYS       CULTURE, BLOOD [[id number]] Collected: 08/01/21 1519    Order Status: Completed Specimen: Blood Updated: 08/05/21 0928     Special Requests: --        RIGHT  Antecubital       Culture result: NO GROWTH 4 DAYS       COVID-19 RAPID TEST [[id number]] Collected: 08/01/21 1056    Order Status: Completed Specimen: Nasopharyngeal Updated: 08/01/21 1258     Specimen source NASAL        COVID-19 rapid test Not detected        Comment:      The specimen is NEGATIVE for SARS-CoV-2, the novel coronavirus associated with COVID-19. A negative result does not rule out COVID-19.        This test has been authorized by the FDA under an Emergency Use Authorization (EUA) for use by authorized laboratories. Fact sheet for Healthcare Providers: ConventionUpdate.co.nz  Fact sheet for Patients: ConventionUpdate.co.nz       Methodology: Isothermal Nucleic Acid Amplification               Other Studies:  XR ABD (KUB)    Result Date: 8/5/2021  PORTABLE ABDOMEN, August 5, 2021 at 1134 hours: CLINICAL HISTORY:  Endogastric tube placement. COMPARISON:  August 3, 2021. FINDINGS:  AP supine image demonstrates a the gastric tube with the proximal sidehole just below the gastroesophageal junction. No dilated bowel loops are evident in the upper abdomen. There are overlying radiopaque support devices. Nasogastric tube proximal sidehole is just below the gastroesophageal junction. Advancement by approximately 10 cm is suggested.       Current Meds:  Current Facility-Administered Medications   Medication Dose Route Frequency    sodium phosphate 30 mmol in 0.9% sodium chloride 250 mL infusion   IntraVENous ONCE    lisinopriL (PRINIVIL, ZESTRIL) tablet 5 mg  5 mg Per NG tube DAILY    pantoprazole (PROTONIX) tablet 40 mg  40 mg Oral ACB    tuberculin injection 5 Units  5 Units IntraDERMal ONCE    cefTRIAXone (ROCEPHIN) 1 g in 0.9% sodium chloride (MBP/ADV) 50 mL MBP  1 g IntraVENous Q24H    carbidopa-levodopa (SINEMET)  mg per tablet 1 Tablet  1 Tablet Per NG tube QHS    carbidopa-levodopa (SINEMET)  mg per tablet 1 Tablet  1 Tablet Per NG tube TID    bromocriptine (PARLODEL) tablet 2.5 mg  2.5 mg Per NG tube TID    sodium chloride (NS) flush 5-40 mL  5-40 mL IntraVENous Q8H    sodium chloride (NS) flush 5-40 mL  5-40 mL IntraVENous PRN    acetaminophen (TYLENOL) tablet 650 mg  650 mg Oral Q6H PRN    Or    acetaminophen (TYLENOL) suppository 650 mg  650 mg Rectal Q6H PRN    polyethylene glycol (MIRALAX) packet 17 g  17 g Oral DAILY PRN    ondansetron (ZOFRAN ODT) tablet 4 mg  4 mg Oral Q8H PRN    Or    ondansetron (ZOFRAN) injection 4 mg  4 mg IntraVENous Q6H PRN    enoxaparin (LOVENOX) injection 40 mg  40 mg SubCUTAneous DAILY    NUTRITIONAL SUPPORT ELECTROLYTE PRN ORDERS   Does Not Apply PRN       Signed:  Vonnie Benavidez DO    Part of this note may have been written by using a voice dictation software. The note has been proof read but may still contain some grammatical/other typographical errors.

## 2021-08-06 NOTE — PROGRESS NOTES
Ran bladder scan due to pt not urinating scan showed over 300 ml notified on call hospitalist waiting for orders.

## 2021-08-06 NOTE — PROGRESS NOTES
Hospitalist Progress Note   Admit Date:  2021 10:36 AM   Name:  Lisseth Galicia   Age:  81 y.o. Sex:  male  :  1935   MRN:  [id number]     Presenting Complaint: Unresponsive    Reason(s) for Admission: Respiratory failure with hypoxia [facility] Course & Interval History:     Lisseth Galicia is a 81 y.o. male with history of parkinson dementia, parkinson disease, peptic ulcer disease, anorexia who was admitted for acute metabolic encephalopathy. He presented as a transfer from facility. He had become acutely altered and was intubated. He was briefly in the ICU from -. On  he was extubated and stable for transfer to the floor where we resume care. Of note he has a DNR order. He he remains obtunded and neurology is consulted. Neurology is working under the diagnosis of rapidly progressive Parkinson disease and is ruling out other possible etiologies. He has chronic anemia and mild glucose impairment. He is requiring minimal medical support for these derangements. Nutrition has been consulted due to a BMI of 16 and the fact that he is currently on tube feeds through an NG tube. Subjective (21): Much more alert today. Pt pulled out ngt on his own. Assessment & Plan:   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[address]  Altered mental status since transfer from facility.  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  Continue bromocriptine 2.5mg tid as per neurology  Continue sinemet     Monoclonal paraproteinemia  -consider hematology/oncology consult     Tremor  -Carbidopa levodopa     Required emergency intubation  -Empiric antibiotics azithromycin, ceftriaxone atbx D6/7     PUD (peptic ulcer disease)  -pantoprazole for omeprazole     GERD (gastroesophageal reflux disease)  -Famotidine     Severe protein-calorie malnutrition (HCC)  BMI 16.2  -nutrition consult   -speech has cleared for diet  - removed his NGT on his own today, tolerating pills    Hypophosphetemia  - replete as per nutrition replacement guidelines.      Weight loss  -nutrition consult  -PT, OT, PPD, CM  -If patient fails speech trials as above - would consult palliative care     Dispo/Discharge Planning:  Pending, prior to stay at [facility] was living with wife and using RW for ambulation.      Diet:  ADULT DIET Dysphagia - Pureed  ADULT ORAL NUTRITION SUPPLEMENT Breakfast, Lunch, Dinner; Standard High Calorie/High Protein  DVT PPx: lovenox    Code status: DNR    Active Hospital Problems    Diagnosis Date Noted    Required emergency intubation 08/04/2021     Priority: 4 - Four    Severe protein-calorie malnutrition ([facility] [address] 08/04/2021     Priority: 6 - Six    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[facility] [address] 06/24/2021    Tremor 07/11/2013    Weight loss 01/18/2013    Monoclonal paraproteinemia 09/04/2012    GERD (gastroesophageal reflux disease) 09/04/2012    PUD (peptic ulcer disease) 09/04/2012       Objective:     Patient Vitals for the past 24 hrs:   Temp Pulse Resp BP SpO2   08/06/21 1618 98.3 °F (36.8 °C) 88 20 (!) 140/65 93 %   08/06/21 1545  (!) 117      08/06/21 1200  78      08/06/21 1059 97.9 °F (36.6 °C) 79 20 129/72 92 %   08/06/21 0758  78      08/06/21 0722 97.9 °F (36.6 °C) 77 20 120/71 92 %   08/06/21 0334 98.7 °F (37.1 °C) 92 20 (!) 144/72 94 %   08/05/21 2354 98.4 °F (36.9 °C) 79 20 (!) 155/76 96 %   08/05/21 1935  (!) 111 22 (!) 153/69 91 %     Oxygen Therapy  O2 Sat (%): 93 % (08/06/21 1618)  Pulse via Oximetry: 77 beats per minute (08/04/21 0931)  O2 Device: Nasal cannula (08/05/21 0707)  Skin Assessment: Clean, dry, & intact (08/05/21 0707)  Skin Protection for O2 Device: No (08/04/21 5281)  Orientation: Bilateral (08/03/21 0701)  Location: Cheek (08/03/21 0701)  Interventions: Mouth Care (08/03/21 0701)  O2 Flow Rate (L/min): 2 l/min (08/05/21 0707)  FIO2 (%): 29 % (08/03/21 1001)    Estimated body mass index is 17.43 kg/m² as calculated from the following:    Height as of this encounter: 5' 10\" (1.778 m). Weight as of this encounter: 55.1 kg (121 lb 8 oz). Intake/Output Summary (Last 24 hours) at 8/6/2021 1902  Last data filed at 8/6/2021 1827  Gross per 24 hour   Intake 525 ml   Output 500 ml   Net 25 ml         Physical Exam:     General:    malnourished. No overt distress more alert  Head:  Normocephalic, atraumatic NGT in place  Eyes:  Sclerae appear normal.  Pupils equally round. Sunken orbits  HENT:  Nares appear normal, no drainage. Moist mucous membranes  Neck:  No restricted ROM. Trachea midline  CV:   RRR. No m/r/g. No JVD  Lungs:   CTAB. No wheezing, rhonchi, or rales. Appears even, unlabored  Abdomen: Bowel sounds present. Soft, nontender, nondistended. Extremities: Warm and dry. No cyanosis or clubbing. No edema. Skin:     No rashes. Normal turgor. Normal coloration  Neuro:  Cranial nerves II-XII grossly intact.    Sensation intact  Psych:  Alert, oriented to place only    Data Ordered and Personally Reviewed:    Last 24hr Labs:  Recent Results (from the past 24 hour(s))   GLUCOSE, POC    Collection Time: 08/06/21  1:37 AM   Result Value Ref Range    Glucose (POC) 118 (H) 65 - 100 mg/dL    Performed by [employer]    MAGNESIUM    Collection Time: 08/06/21  6:18 AM   Result Value Ref Range    Magnesium 2.2 1.8 - 2.4 mg/dL   PHOSPHORUS    Collection Time: 08/06/21  6:18 AM   Result Value Ref Range    Phosphorus 3.5 2.3 - 3.7 MG/DL   CBC WITH AUTOMATED DIFF    Collection Time: 08/06/21  6:18 AM   Result Value Ref Range    WBC 6.6 4.3 - 11.1 K/uL    RBC 2.94 (L) 4.23 - 5.6 M/uL    HGB 9.5 (L) 13.6 - 17.2 g/dL    HCT 27.4 (L) 41.1 - 50.3 %    MCV 93.2 79.6 - 97.8 FL    MCH 32.3 26.1 - 32.9 PG    MCHC 34.7 31.4 - 35.0 g/dL    RDW 13.1 11.9 - 14.6 %    PLATELET 526 797 - 325 K/uL    MPV 9.2 (L) 9.4 - 12.3 FL    ABSOLUTE NRBC 0.00 0.0 - 0.2 K/uL    DF AUTOMATED      NEUTROPHILS 79 (H) 43 - 78 %    LYMPHOCYTES 12 (L) 13 - 44 %    MONOCYTES 8 4.0 - 12.0 %    EOSINOPHILS 0 (L) 0.5 - 7.8 %    BASOPHILS 0 0.0 - 2.0 %    IMMATURE GRANULOCYTES 1 0.0 - 5.0 %    ABS. NEUTROPHILS 5.2 1.7 - 8.2 K/UL    ABS. LYMPHOCYTES 0.8 0.5 - 4.6 K/UL    ABS. MONOCYTES 0.5 0.1 - 1.3 K/UL    ABS. EOSINOPHILS 0.0 0.0 - 0.8 K/UL    ABS. BASOPHILS 0.0 0.0 - 0.2 K/UL    ABS. IMM. GRANS. 0.1 0.0 - 0.5 K/UL   METABOLIC PANEL, COMPREHENSIVE    Collection Time: 08/06/21  6:18 AM   Result Value Ref Range    Sodium 144 138 - 145 mmol/L    Potassium 3.3 (L) 3.5 - 5.1 mmol/L    Chloride 109 (H) 98 - 107 mmol/L    CO2 30 21 - 32 mmol/L    Anion gap 5 (L) 7 - 16 mmol/L    Glucose 137 (H) 65 - 100 mg/dL    BUN 21 8 - 23 MG/DL    Creatinine 0.53 (L) 0.8 - 1.5 MG/DL    GFR est AA >60 >60 ml/min/1.73m2    GFR est non-AA >60 >60 ml/min/1.73m2    Calcium 8.4 8.3 - 10.4 MG/DL    Bilirubin, total 0.5 0.2 - 1.1 MG/DL    ALT (SGPT) 17 12 - 65 U/L    AST (SGOT) 54 (H) 15 - 37 U/L    Alk.  phosphatase 68 50 - 136 U/L    Protein, total 5.6 (L) 6.3 - 8.2 g/dL    Albumin 2.4 (L) 3.2 - 4.6 g/dL    Globulin 3.2 2.3 - 3.5 g/dL    A-G Ratio 0.8 (L) 1.2 - 3.5     CK    Collection Time: 08/06/21  6:18 AM   Result Value Ref Range     (H) 21 - 215 U/L   GLUCOSE, POC    Collection Time: 08/06/21  7:01 AM   Result Value Ref Range    Glucose (POC) 139 (H) 65 - 100 mg/dL    Performed by [facility]        All Micro Results     Procedure Component Value Units Date/Time    CULTURE, BLOOD [[id number]] Collected: 08/01/21 1622    Order Status: Completed Specimen: Blood Updated: 08/06/21 0752     Special Requests: --        RIGHT  ARM       Culture result: NO GROWTH 5 DAYS       CULTURE, BLOOD [[id number]] Collected: 08/01/21 1519    Order Status: Completed Specimen: Blood Updated: 08/06/21 0752     Special Requests: --        RIGHT  Antecubital       Culture result: NO GROWTH 5 DAYS       COVID-19 RAPID TEST [[id number]] Collected: 08/01/21 1056    Order Status: Completed Specimen: Nasopharyngeal Updated: 08/01/21 1258     Specimen source NASAL        COVID-19 rapid test Not detected        Comment:      The specimen is NEGATIVE for SARS-CoV-2, the novel coronavirus associated with COVID-19. A negative result does not rule out COVID-19. This test has been authorized by the FDA under an Emergency Use Authorization (EUA) for use by authorized laboratories. Fact sheet for Healthcare Providers: [employer]  Fact sheet for Patients: [employer]       Methodology: Isothermal Nucleic Acid Amplification               Other Studies:  XR ABD (KUB)    Result Date: 8/6/2021  Clinical history: NG tube placement TECHNIQUE: AP supine abdominal x-ray. FINDINGS: Enteric tube courses below the diaphragm, with the tip in the area of the mid stomach. Cholecystectomy clips and left-sided cardiac pacer are noted. 1. Tip of the enteric tube is in the mid stomach.       Current Meds:  Current Facility-Administered Medications   Medication Dose Route Frequency    bromocriptine (PARLODEL) tablet 2.5 mg  2.5 mg Oral TID    carbidopa-levodopa (SINEMET)  mg per tablet 1 Tablet  1 Tablet Oral TID    [START ON 8/7/2021] lisinopriL (PRINIVIL, ZESTRIL) tablet 5 mg  5 mg Oral DAILY    pantoprazole (PROTONIX) tablet 40 mg  40 mg Oral ACB    cefTRIAXone (ROCEPHIN) 1 g in 0.9% sodium chloride (MBP/ADV) 50 mL MBP  1 g IntraVENous Q24H    carbidopa-levodopa (SINEMET)  mg per tablet 1 Tablet  1 Tablet Per NG tube QHS    sodium chloride (NS) flush 5-40 mL  5-40 mL IntraVENous Q8H    sodium chloride (NS) flush 5-40 mL  5-40 mL IntraVENous PRN    acetaminophen (TYLENOL) tablet 650 mg  650 mg Oral Q6H PRN    Or    acetaminophen (TYLENOL) suppository 650 mg  650 mg Rectal Q6H PRN    polyethylene glycol (MIRALAX) packet 17 g  17 g Oral DAILY PRN    ondansetron (ZOFRAN ODT) tablet 4 mg  4 mg Oral Q8H PRN    Or    ondansetron (ZOFRAN) injection 4 mg  4 mg IntraVENous Q6H PRN    enoxaparin (LOVENOX) injection 40 mg  40 mg SubCUTAneous DAILY    NUTRITIONAL SUPPORT ELECTROLYTE PRN ORDERS   Does Not Apply PRN       Signed:  Cathy Starr DO    Part of this note may have been written by using a voice dictation software. The note has been proof read but may still contain some grammatical/other typographical errors.

## 2021-08-07 LAB
ANION GAP SERPL CALC-SCNC: 6 MMOL/L (ref 7–16)
BASOPHILS # BLD: 0 K/UL (ref 0–0.2)
BASOPHILS NFR BLD: 0 % (ref 0–2)
BUN SERPL-MCNC: 19 MG/DL (ref 8–23)
CALCIUM SERPL-MCNC: 8.6 MG/DL (ref 8.3–10.4)
CHLORIDE SERPL-SCNC: 108 MMOL/L (ref 98–107)
CK SERPL-CCNC: 313 U/L (ref 21–215)
CO2 SERPL-SCNC: 29 MMOL/L (ref 21–32)
CREAT SERPL-MCNC: 0.62 MG/DL (ref 0.8–1.5)
DIFFERENTIAL METHOD BLD: ABNORMAL
EOSINOPHIL # BLD: 0 K/UL (ref 0–0.8)
EOSINOPHIL NFR BLD: 0 % (ref 0.5–7.8)
ERYTHROCYTE [DISTWIDTH] IN BLOOD BY AUTOMATED COUNT: 13.2 % (ref 11.9–14.6)
GLUCOSE SERPL-MCNC: 104 MG/DL (ref 65–100)
HCT VFR BLD AUTO: 28.5 % (ref 41.1–50.3)
HGB BLD-MCNC: 9.7 G/DL (ref 13.6–17.2)
IMM GRANULOCYTES # BLD AUTO: 0.1 K/UL (ref 0–0.5)
IMM GRANULOCYTES NFR BLD AUTO: 1 % (ref 0–5)
LYMPHOCYTES # BLD: 0.7 K/UL (ref 0.5–4.6)
LYMPHOCYTES NFR BLD: 12 % (ref 13–44)
MCH RBC QN AUTO: 32.9 PG (ref 26.1–32.9)
MCHC RBC AUTO-ENTMCNC: 34 G/DL (ref 31.4–35)
MCV RBC AUTO: 96.6 FL (ref 79.6–97.8)
MONOCYTES # BLD: 0.5 K/UL (ref 0.1–1.3)
MONOCYTES NFR BLD: 8 % (ref 4–12)
NEUTS SEG # BLD: 4.9 K/UL (ref 1.7–8.2)
NEUTS SEG NFR BLD: 79 % (ref 43–78)
NRBC # BLD: 0 K/UL (ref 0–0.2)
PLATELET # BLD AUTO: 266 K/UL (ref 150–450)
PMV BLD AUTO: 9.3 FL (ref 9.4–12.3)
POTASSIUM SERPL-SCNC: 3.9 MMOL/L (ref 3.5–5.1)
RBC # BLD AUTO: 2.95 M/UL (ref 4.23–5.6)
SODIUM SERPL-SCNC: 143 MMOL/L (ref 138–145)
WBC # BLD AUTO: 6.2 K/UL (ref 4.3–11.1)

## 2021-08-07 PROCEDURE — 74011250637 HC RX REV CODE- 250/637: Performed by: INTERNAL MEDICINE

## 2021-08-07 PROCEDURE — 74011000258 HC RX REV CODE- 258: Performed by: INTERNAL MEDICINE

## 2021-08-07 PROCEDURE — 74011250636 HC RX REV CODE- 250/636: Performed by: NURSE PRACTITIONER

## 2021-08-07 PROCEDURE — 36415 COLL VENOUS BLD VENIPUNCTURE: CPT

## 2021-08-07 PROCEDURE — 65270000029 HC RM PRIVATE

## 2021-08-07 PROCEDURE — 74011250636 HC RX REV CODE- 250/636: Performed by: INTERNAL MEDICINE

## 2021-08-07 PROCEDURE — 82550 ASSAY OF CK (CPK): CPT

## 2021-08-07 PROCEDURE — 80048 BASIC METABOLIC PNL TOTAL CA: CPT

## 2021-08-07 PROCEDURE — 74011250637 HC RX REV CODE- 250/637: Performed by: NURSE PRACTITIONER

## 2021-08-07 PROCEDURE — 92526 ORAL FUNCTION THERAPY: CPT

## 2021-08-07 PROCEDURE — 51798 US URINE CAPACITY MEASURE: CPT

## 2021-08-07 PROCEDURE — APPSS45 APP SPLIT SHARED TIME 31-45 MINUTES: Performed by: NURSE PRACTITIONER

## 2021-08-07 PROCEDURE — 99232 SBSQ HOSP IP/OBS MODERATE 35: CPT | Performed by: PSYCHIATRY & NEUROLOGY

## 2021-08-07 PROCEDURE — 85025 COMPLETE CBC W/AUTO DIFF WBC: CPT

## 2021-08-07 RX ORDER — BROMOCRIPTINE MESYLATE 2.5 MG/1
2.5 TABLET ORAL 2 TIMES DAILY
Status: DISCONTINUED | OUTPATIENT
Start: 2021-08-07 | End: 2021-08-08

## 2021-08-07 RX ADMIN — CARBIDOPA AND LEVODOPA 1 TABLET: 25; 100 TABLET ORAL at 14:28

## 2021-08-07 RX ADMIN — BROMOCRIPTINE MESYLATE 2.5 MG: 2.5 TABLET ORAL at 17:14

## 2021-08-07 RX ADMIN — ENOXAPARIN SODIUM 40 MG: 40 INJECTION SUBCUTANEOUS at 09:24

## 2021-08-07 RX ADMIN — Medication 10 ML: at 21:08

## 2021-08-07 RX ADMIN — Medication 10 ML: at 14:25

## 2021-08-07 RX ADMIN — LISINOPRIL 5 MG: 5 TABLET ORAL at 09:24

## 2021-08-07 RX ADMIN — CARBIDOPA AND LEVODOPA 1 TABLET: 25; 100 TABLET ORAL at 21:00

## 2021-08-07 RX ADMIN — CEFTRIAXONE 1 G: 1 INJECTION, POWDER, FOR SOLUTION INTRAMUSCULAR; INTRAVENOUS at 09:23

## 2021-08-07 RX ADMIN — CARBIDOPA AND LEVODOPA 1 TABLET: 25; 100 TABLET ORAL at 09:24

## 2021-08-07 RX ADMIN — ACETAMINOPHEN 650 MG: 325 TABLET ORAL at 16:16

## 2021-08-07 RX ADMIN — BROMOCRIPTINE MESYLATE 2.5 MG: 2.5 TABLET ORAL at 09:24

## 2021-08-07 RX ADMIN — Medication 10 ML: at 09:24

## 2021-08-07 NOTE — PROGRESS NOTES
Hospitalist Progress Note   Admit Date:  2021 10:36 AM   Name:  Marlen Pedraza   Age:  81 y.o. Sex:  male  :  1935   MRN:  [id number]     Presenting Complaint: Unresponsive    Reason(s) for Admission: Respiratory failure with hypoxia [facility] Course & Interval History:     Marlen Pedraza is a 81 y.o. male with history of parkinson dementia, parkinson disease, peptic ulcer disease, anorexia who was admitted for acute metabolic encephalopathy. He presented as a transfer from facility. He had become acutely altered and was intubated. He was briefly in the ICU from -. On  he was extubated and stable for transfer to the floor where we resume care. Of note he has a DNR order. He he remains obtunded and neurology is consulted. Neurology is working under the diagnosis of rapidly progressive Parkinson disease and is ruling out other possible etiologies. He has chronic anemia and mild glucose impairment. He is requiring minimal medical support for these derangements. Nutrition has been consulted due to a BMI of 16 and the fact that he is currently on tube feeds through an NG tube. Subjective (21): Much more alert today. Wife at bedside  Patient thinks he is driving a tractor- holding onto bed rails    Assessment & Plan:   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[address]  Altered mental status since transfer from facility. 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 outpt     Tremor  -Carbidopa levodopa     Required emergency intubation  -Empiric antibiotics azithromycin, ceftriaxone atbx D7/7     PUD (peptic ulcer disease)  -pantoprazole for omeprazole     GERD (gastroesophageal reflux disease)  -Famotidine     Severe protein-calorie malnutrition (HCC)  BMI 16.2  -nutrition consult   -speech has cleared for diet  - removed his NGT on his own today, tolerating pills    Hypophosphetemia  - replete as per nutrition replacement guidelines.      Weight loss  -nutrition consult  -PT, OT, PPD, CM  -speech has cleared for diet on 8/6, NGT removed    Dispo/Discharge Planning:  Pending, prior to stay at [facility] was living with wife and using RW for ambulation. Will need STR at discharge.  CM following      Diet:  ADULT ORAL NUTRITION SUPPLEMENT Breakfast, Lunch, Dinner; Standard High Calorie/High Protein  ADULT DIET Dysphagia - Soft & Bite Sized  DVT PPx: lovenox    Code status: DNR    Active Hospital Problems    Diagnosis Date Noted    Required emergency intubation 08/04/2021     Priority: 4 - Four    Severe protein-calorie malnutrition [address] [address] 08/04/2021     Priority: 6 - Six    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[facility] [address] 06/24/2021    Tremor 07/11/2013    Weight loss 01/18/2013    Monoclonal paraproteinemia 09/04/2012    GERD (gastroesophageal reflux disease) 09/04/2012    PUD (peptic ulcer disease) 09/04/2012       Objective:     Patient Vitals for the past 24 hrs:   Temp Pulse Resp BP SpO2   08/07/21 1118 98.1 °F (36.7 °C) 74 20 110/62 93 %   08/07/21 0805 98.3 °F (36.8 °C) 70 17 (!) 146/74 95 %   08/07/21 0759  78      08/07/21 0419 100 °F (37.8 °C) 76 16 131/65 92 %   08/06/21 1949 98.1 °F (36.7 °C) 77 16 137/70 92 %   08/06/21 1618 98.3 °F (36.8 °C) 88 20 (!) 140/65 93 %   08/06/21 1545  (!) 117        Oxygen Therapy  O2 Sat (%): 93 % (08/07/21 1118)  Pulse via Oximetry: 77 beats per minute (08/04/21 0931)  O2 Device: None (Room air) (08/07/21 1118)  Skin Assessment: Clean, dry, & intact (08/05/21 9385)  Skin Protection for O2 Device: No (08/04/21 4300)  Orientation: Bilateral (08/03/21 0701)  Location: Cheek (08/03/21 0701)  Interventions: Mouth Care (08/03/21 0701)  O2 Flow Rate (L/min): 2 l/min (08/05/21 0707)  FIO2 (%): 29 % (08/03/21 1001)    Estimated body mass index is 17.03 kg/m² as calculated from the following:    Height as of this encounter: 5' 10\" (1.778 m). Weight as of this encounter: 53.8 kg (118 lb 11.2 oz). Intake/Output Summary (Last 24 hours) at 8/7/2021 1342  Last data filed at 8/7/2021 0906  Gross per 24 hour   Intake 120 ml   Output 850 ml   Net -730 ml         Physical Exam:     General:    malnourished. No overt distress more alert  Head:  Normocephalic, atraumatic NGT in place  Eyes:  Sclerae appear normal.  Pupils equally round. Sunken orbits  HENT:  Nares appear normal, no drainage. Moist mucous membranes  Neck:  No restricted ROM. Trachea midline  CV:   RRR. No m/r/g. No JVD  Lungs:   CTAB. No wheezing, rhonchi, or rales. Appears even, unlabored  Abdomen: Bowel sounds present. Soft, nontender, nondistended. Extremities: Warm and dry. No cyanosis or clubbing. No edema. Skin:     No rashes. Normal turgor. Normal coloration  Neuro:  Cranial nerves II-XII grossly intact.    Sensation intact  Psych:  Alert, oriented to place only    Data Ordered and Personally Reviewed:    Last 24hr Labs:  Recent Results (from the past 24 hour(s))   PLEASE READ & DOCUMENT PPD TEST IN 48 HRS    Collection Time: 08/06/21  9:53 PM   Result Value Ref Range    PPD Negative Negative    mm Induration 0 0 - 5 mm   METABOLIC PANEL, BASIC    Collection Time: 08/07/21  6:06 AM   Result Value Ref Range    Sodium 143 138 - 145 mmol/L    Potassium 3.9 3.5 - 5.1 mmol/L    Chloride 108 (H) 98 - 107 mmol/L    CO2 29 21 - 32 mmol/L    Anion gap 6 (L) 7 - 16 mmol/L    Glucose 104 (H) 65 - 100 mg/dL    BUN 19 8 - 23 MG/DL    Creatinine 0.62 (L) 0.8 - 1.5 MG/DL    GFR est AA >60 >60 ml/min/1.73m2    GFR est non-AA >60 >60 ml/min/1.73m2    Calcium 8.6 8.3 - 10.4 MG/DL   CBC WITH AUTOMATED DIFF    Collection Time: 08/07/21  8:02 AM   Result Value Ref Range    WBC 6.2 4.3 - 11.1 K/uL    RBC 2.95 (L) 4.23 - 5.6 M/uL    HGB 9.7 (L) 13.6 - 17.2 g/dL    HCT 28.5 (L) 41.1 - 50.3 % MCV 96.6 79.6 - 97.8 FL    MCH 32.9 26.1 - 32.9 PG    MCHC 34.0 31.4 - 35.0 g/dL    RDW 13.2 11.9 - 14.6 %    PLATELET 721 146 - 999 K/uL    MPV 9.3 (L) 9.4 - 12.3 FL    ABSOLUTE NRBC 0.00 0.0 - 0.2 K/uL    DF AUTOMATED      NEUTROPHILS 79 (H) 43 - 78 %    LYMPHOCYTES 12 (L) 13 - 44 %    MONOCYTES 8 4.0 - 12.0 %    EOSINOPHILS 0 (L) 0.5 - 7.8 %    BASOPHILS 0 0.0 - 2.0 %    IMMATURE GRANULOCYTES 1 0.0 - 5.0 %    ABS. NEUTROPHILS 4.9 1.7 - 8.2 K/UL    ABS. LYMPHOCYTES 0.7 0.5 - 4.6 K/UL    ABS. MONOCYTES 0.5 0.1 - 1.3 K/UL    ABS. EOSINOPHILS 0.0 0.0 - 0.8 K/UL    ABS. BASOPHILS 0.0 0.0 - 0.2 K/UL    ABS. IMM. GRANS. 0.1 0.0 - 0.5 K/UL   CK    Collection Time: 08/07/21  8:02 AM   Result Value Ref Range     (H) 21 - 215 U/L       All Micro Results     Procedure Component Value Units Date/Time    CULTURE, BLOOD [[id number]] Collected: 08/01/21 1622    Order Status: Completed Specimen: Blood Updated: 08/06/21 0752     Special Requests: --        RIGHT  ARM       Culture result: NO GROWTH 5 DAYS       CULTURE, BLOOD [[id number]] Collected: 08/01/21 1519    Order Status: Completed Specimen: Blood Updated: 08/06/21 0752     Special Requests: --        RIGHT  Antecubital       Culture result: NO GROWTH 5 DAYS       COVID-19 RAPID TEST [[id number]] Collected: 08/01/21 1056    Order Status: Completed Specimen: Nasopharyngeal Updated: 08/01/21 1258     Specimen source NASAL        COVID-19 rapid test Not detected        Comment:      The specimen is NEGATIVE for SARS-CoV-2, the novel coronavirus associated with COVID-19. A negative result does not rule out COVID-19. This test has been authorized by the FDA under an Emergency Use Authorization (EUA) for use by authorized laboratories.         Fact sheet for Healthcare Providers: [employer]nz  Fact sheet for Patients: [employer]nz       Methodology: Isothermal Nucleic Acid Amplification               Other Studies:  No results found. Current Meds:  Current Facility-Administered Medications   Medication Dose Route Frequency    bromocriptine (PARLODEL) tablet 2.5 mg  2.5 mg Oral BID    carbidopa-levodopa (SINEMET)  mg per tablet 1 Tablet  1 Tablet Oral TID    lisinopriL (PRINIVIL, ZESTRIL) tablet 5 mg  5 mg Oral DAILY    pantoprazole (PROTONIX) tablet 40 mg  40 mg Oral ACB    cefTRIAXone (ROCEPHIN) 1 g in 0.9% sodium chloride (MBP/ADV) 50 mL MBP  1 g IntraVENous Q24H    carbidopa-levodopa (SINEMET)  mg per tablet 1 Tablet  1 Tablet Per NG tube QHS    sodium chloride (NS) flush 5-40 mL  5-40 mL IntraVENous Q8H    sodium chloride (NS) flush 5-40 mL  5-40 mL IntraVENous PRN    acetaminophen (TYLENOL) tablet 650 mg  650 mg Oral Q6H PRN    Or    acetaminophen (TYLENOL) suppository 650 mg  650 mg Rectal Q6H PRN    polyethylene glycol (MIRALAX) packet 17 g  17 g Oral DAILY PRN    ondansetron (ZOFRAN ODT) tablet 4 mg  4 mg Oral Q8H PRN    Or    ondansetron (ZOFRAN) injection 4 mg  4 mg IntraVENous Q6H PRN    enoxaparin (LOVENOX) injection 40 mg  40 mg SubCUTAneous DAILY    NUTRITIONAL SUPPORT ELECTROLYTE PRN ORDERS   Does Not Apply PRN       Signed:  Mervat Gann DO    Part of this note may have been written by using a voice dictation software. The note has been proof read but may still contain some grammatical/other typographical errors.

## 2021-08-07 NOTE — PROGRESS NOTES
Pt has new onset of 1+ pitting edema to LUE. No complaints voiced by patient, arm elevated. MD notified.

## 2021-08-07 NOTE — PROGRESS NOTES
Neurology Daily Progress Note     Assessment:     15-year-old man with worsening parkinsonism over the last 6 months. Presented with increased muscle tone, but not leadpipe rigidity, and myoclonus. CK and myoglobin were both elevated, CK improving. 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 No evidence of potent neuroleptic administration, which could also cause this result. EEG showed slowing consistent with a moderate to severe degree of encephalopathy. Continue Sinemet and bromocriptine. Plan:     Continue Sinemet     Decrease bromocriptine 2.5 mg BID    Continue to trend CK levels    Management of Delirium     Non-pharmacological intervention  · Reorient the patient throughout the day  · Window open and lights on during the day. Lights off, television off, noises down during the night. If able, decrease nursing checks at night  · Therapies as often as possible  · Avoid restraints to the best of your ability   · Avoid sensory deprivation by using glasses and hearing aids, if applicable       Pharmacological intervention  · Replete electrolyte abnormalities and correct metabolic abnormalities  · Limit benzodiazepines, antihistamines, narcotics, anticholinergics. Preference towards Precedex for sedation over fentanyl and benzodiazepines/GABAa agonists. · For dangerous behavior/aggression to self or others can consider low dose Zyprexa or Seroquel if benefits outweigh risk. The patient should not be given typical antipsychotics such as Haldol or Geodon. · For persistent insomnia can use melatonin four hours prior to bedtime       Subjective: Interval history:    Alert and oriented to person and place. Able to follow commands, moving all extremities against gravity. Bradyphrenic and bradykinetic. . Continue Sinemet and decrease Bromocriptine 2.5 mg po BID.       History:    Usha Segundo is a 81 y.o. male who is being seen for PD and rigidity. Unable to obtain ROS due to AMS.         Objective:     Vitals:    08/06/21 1949 08/07/21 0419 08/07/21 0759 08/07/21 0805   BP: 137/70 131/65  (!) 146/74   Pulse: 77 76 78 70   Resp: 16 16  17   Temp: 98.1 °F (36.7 °C) 100 °F (37.8 °C)  98.3 °F (36.8 °C)   SpO2: 92% 92%  95%   Weight:  118 lb 11.2 oz (53.8 kg)     Height:              Current Facility-Administered Medications:     bromocriptine (PARLODEL) tablet 2.5 mg, 2.5 mg, Oral, BID, Stefany Naik APRN    carbidopa-levodopa (SINEMET)  mg per tablet 1 Tablet, 1 Tablet, Oral, TID, Jose, Estefani C, DO, 1 Tablet at 08/07/21 0924    lisinopriL (PRINIVIL, ZESTRIL) tablet 5 mg, 5 mg, Oral, DAILY, Garcia, Estefani C, DO, 5 mg at 08/07/21 0924    pantoprazole (PROTONIX) tablet 40 mg, 40 mg, Oral, ACB, Garcia, Estefani C, DO, 40 mg at 08/06/21 4177    cefTRIAXone (ROCEPHIN) 1 g in 0.9% sodium chloride (MBP/ADV) 50 mL MBP, 1 g, IntraVENous, Q24H, Alfredo Juárez MD, Last Rate: 100 mL/hr at 08/07/21 0923, 1 g at 08/07/21 0923    carbidopa-levodopa (SINEMET)  mg per tablet 1 Tablet, 1 Tablet, Per NG tube, QHS, Garcia, Estefani C, DO, 1 Tablet at 08/06/21 2128    sodium chloride (NS) flush 5-40 mL, 5-40 mL, IntraVENous, Q8H, Deric Pettit, NP, 10 mL at 08/07/21 0924    sodium chloride (NS) flush 5-40 mL, 5-40 mL, IntraVENous, PRN, Andrew WITT, NP    acetaminophen (TYLENOL) tablet 650 mg, 650 mg, Oral, Q6H PRN, 650 mg at 08/06/21 1425 **OR** acetaminophen (TYLENOL) suppository 650 mg, 650 mg, Rectal, Q6H PRN, Andrew Lowing D, NP    polyethylene glycol (MIRALAX) packet 17 g, 17 g, Oral, DAILY PRN, Andrew Lowing D, NP    ondansetron (ZOFRAN ODT) tablet 4 mg, 4 mg, Oral, Q8H PRN **OR** ondansetron (ZOFRAN) injection 4 mg, 4 mg, IntraVENous, Q6H PRN, Andrew Lowing D, NP    enoxaparin (LOVENOX) injection 40 mg, 40 mg, SubCUTAneous, DAILY, Adnrew Lowing D, NP, 40 mg at 08/07/21 0983    NUTRITIONAL SUPPORT ELECTROLYTE PRN ORDERS, , Does Not Apply, PRN, Alfredo Juárez MD    Recent Results (from the past 12 hour(s))   METABOLIC PANEL, BASIC    Collection Time: 08/07/21  6:06 AM   Result Value Ref Range    Sodium 143 138 - 145 mmol/L    Potassium 3.9 3.5 - 5.1 mmol/L    Chloride 108 (H) 98 - 107 mmol/L    CO2 29 21 - 32 mmol/L    Anion gap 6 (L) 7 - 16 mmol/L    Glucose 104 (H) 65 - 100 mg/dL    BUN 19 8 - 23 MG/DL    Creatinine 0.62 (L) 0.8 - 1.5 MG/DL    GFR est AA >60 >60 ml/min/1.73m2    GFR est non-AA >60 >60 ml/min/1.73m2    Calcium 8.6 8.3 - 10.4 MG/DL   CBC WITH AUTOMATED DIFF    Collection Time: 08/07/21  8:02 AM   Result Value Ref Range    WBC 6.2 4.3 - 11.1 K/uL    RBC 2.95 (L) 4.23 - 5.6 M/uL    HGB 9.7 (L) 13.6 - 17.2 g/dL    HCT 28.5 (L) 41.1 - 50.3 %    MCV 96.6 79.6 - 97.8 FL    MCH 32.9 26.1 - 32.9 PG    MCHC 34.0 31.4 - 35.0 g/dL    RDW 13.2 11.9 - 14.6 %    PLATELET 315 792 - 023 K/uL    MPV 9.3 (L) 9.4 - 12.3 FL    ABSOLUTE NRBC 0.00 0.0 - 0.2 K/uL    DF AUTOMATED      NEUTROPHILS 79 (H) 43 - 78 %    LYMPHOCYTES 12 (L) 13 - 44 %    MONOCYTES 8 4.0 - 12.0 %    EOSINOPHILS 0 (L) 0.5 - 7.8 %    BASOPHILS 0 0.0 - 2.0 %    IMMATURE GRANULOCYTES 1 0.0 - 5.0 %    ABS. NEUTROPHILS 4.9 1.7 - 8.2 K/UL    ABS. LYMPHOCYTES 0.7 0.5 - 4.6 K/UL    ABS. MONOCYTES 0.5 0.1 - 1.3 K/UL    ABS. EOSINOPHILS 0.0 0.0 - 0.8 K/UL    ABS. BASOPHILS 0.0 0.0 - 0.2 K/UL    ABS. IMM. GRANS. 0.1 0.0 - 0.5 K/UL   CK    Collection Time: 08/07/21  8:02 AM   Result Value Ref Range     (H) 21 - 215 U/L         Physical Exam:  General -elderly male. Facial tremor  HEENT - Normocephalic, atraumatic. Conjunctiva, tympanic membranes, and oropharynx are clear. Neck - Supple without masses, no bruits   Extremities - Peripheral pulses intact. No edema and no rashes.      Neurological examination - Comprehension, attention, memory and reasoning are impaired. Language and speech are bradyphrenic.    On cranial nerve examination, (II, III, IV, VI) pupils are equal, round, and reactive to light. Visual acuity is adequate. Visual fields are full to finger confrontation. Extraocular motility is normal. (V, VII) Face is symmetric and sensation is intact to light touch. (VIII) Hearing is intact. (IX, X) Palate and uvula elevate symmetrically. Voice is normal. (XI) Shoulder shrug is strong and equal bilaterally. (XII)Tongue is midline. Motor examination - There is normal muscle tone and bulk. Moves all extremities against gravity and to commands. Muscle stretch reflexes 1+ throughout. Bilateral action tremor on right greater than left. Plantar response is flexor. Sensation is intact to light touch, pinprick in all extremities.  Unable to assess cerebellar examination or gait/stance.       Signed By: ARIEL Yeh     August 7, 2021

## 2021-08-07 NOTE — PROGRESS NOTES
LTG: Patient will tolerate least restrictive diet without overt signs or symptoms of airway compromise. STG: Patient will tolerate [soft and bite sized] diet with thin liquids without overt signs or symptoms of airway compromise. [upgraded 8/7/21]  STG: Patient will ongoing po trials to assess for diet advancement. SPEECH LANGUAGE PATHOLOGY: DYSPHAGIA- Daily Note 1    NAME/AGE/GENDER: Lisseth Galicia is a 81 y.o. male  DATE: 8/7/2021  PRIMARY DIAGNOSIS: Respiratory failure with hypoxia ([facility] [address] [J96.91]      ICD-10: Treatment Diagnosis: R13.12 Dysphagia, Oropharyngeal Phase    RECOMMENDATIONS   DIET:    Soft and Bite-Sized   Thin Liquids    MEDICATIONS: With liquid     ASPIRATION PRECAUTIONS  · Slow rate of intake  · Small bites/sips  · Upright at 90 degrees during meal     COMPENSATORY STRATEGIES/MODIFICATIONS  · Assistance with po intake     RECOMMENDATIONS for CONTINUED SPEECH THERAPY:   YES: Anticipate need for ongoing speech therapy during this hospitalization. ASSESSMENT   Patient presents with mild oropharyngeal dysphagia. No overt s/sx with any/all trials. Disliked breakfast (puree). Tolerated chewable textures without complaints of globus sensation this date. Recommend upgrade to soft and bite size diet with thin liquids. Will follow along for diet tolerance and po trials for potential diet advancement. EDUCATION:  · Recommendations discussed with Patient and Family  CONTINUATION OF SKILLED SERVICES/MEDICAL NECESSITY:   Patient is expected to demonstrate progress in  swallow strength, swallow timeliness, swallow function, diet tolerance and swallow safety in order to  improve swallow safety, work toward diet advancement and decrease aspiration risk. REHABILITATION POTENTIAL FOR STATED GOALS: Good    PLAN    FREQUENCY/DURATION: Continue to follow patient 3 times a week for duration of hospital stay to address above goals.     - Recommendations for next treatment session: Next treatment session will address diet tolerance and po trials    SUBJECTIVE   Patient asking for real food. Wife at bedside. Oxygen Device: room air  Pain: Pain Scale 1: Adult Nonverbal Pain Scale  Pain Intensity 1: 0    History of Present Injury/Illness: Mr. 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 Willi Bianchi He also  has a past surgical history that includes hx orthopaedic (rt shoulder); hx heent (1994); hx gastrectomy (1960); hx cholecystectomy (2000); hx hernia repair (1992); and hx back surgery (1990). PRECAUTIONS/ALLERGIES: Codeine and Gadolinium-containing contrast media     Problem List:  (Impairments causing functional limitations):  1. Oropharyngeal dysphagia  2.      Previous Dysphagia: NONE REPORTED  Diet Prior to Evaluation: per speech recommendation minced and moist, but orders for puree    Orientation:  Person  \"hospital\" in [place]      OBJECTIVE   Oral Motor:   · Labial: No impairment  · Dentition: Intact  · Oral Hygiene: Adequate  · Lingual: No impairment  · COMMENTS: Eye droop on R side. Wife reports droop at baseline but feels it is more significant at this time. Dysphagia evaluation:   Patient consumed trials of thin liquids without overt s/sx via straw and cup sip. Slowed but adequate mastication with mixed consistency fruit. No overt s/sx noted. Mildly prolonged oral prep with chewable textures. Tool Used: Dysphagia Outcome and Severity Scale (TUYET)    Score Comments   Normal Diet  [] 7 With no strategies or extra time needed   Functional Swallow  [] 6 May have mild oral or pharyngeal delay   Mild Dysphagia  [] 5 Which may require one diet consistency restricted    Mild-Moderate Dysphagia  [] 4 With 1-2 diet consistencies restricted   Moderate Dysphagia  [] 3 With 2 or more diet consistencies restricted   Moderate-Severe Dysphagia  [] 2 With partial PO strategies (trials with ST only)   Severe Dysphagia  [] 1 With inability to tolerate any PO safely      Score:  Initial: 4 Most Recent: 5 (Date 08/07/21 )   Interpretation of Tool: The Dysphagia Outcome and Severity Scale (TUYET) is a simple, easy-to-use, 7-point scale developed to systematically rate the functional severity of dysphagia based on objective assessment and make recommendations for diet level, independence level, and type of nutrition. Current Medications:   No current facility-administered medications on file prior to encounter. Current Outpatient Medications on File Prior to Encounter   Medication Sig Dispense Refill    atorvastatin (LIPITOR) 40 mg tablet Take 40 mg by mouth nightly.  carbidopa-levodopa (Sinemet)  mg per tablet Take 1 Tablet by mouth nightly.  carbidopa-levodopa (Sinemet)  mg per tablet Take 1 Tablet by mouth three (3) times daily.  lamoTRIgine (LaMICtal) 200 mg tablet Take 200 mg by mouth two (2) times a day.  loratadine 10 mg cap Take 10 mg by mouth daily.  omeprazole (PRILOSEC) 40 mg capsule Take 40 mg by mouth daily.       timolol (TIMOPTIC) 0.5 % ophthalmic solution Administer 1 Drop to both eyes daily.  rivaroxaban (XARELTO) 2.5 mg tablet Take 2.5 mg by mouth two (2) times daily (with meals).  OLANZapine (ZyPREXA) 2.5 mg tablet Take 2.5 mg by mouth every six (6) hours as needed (psychosis or agitation).  ondansetron hcl (ZOFRAN) 4 mg tablet Take 4 mg by mouth every eight (8) hours as needed for Nausea or Nausea or Vomiting.  lisinopril (PRINIVIL, ZESTRIL) 5 mg tablet Take 1 Tab by mouth daily. 90 Tab 3    meclizine (ANTIVERT) 12.5 mg tablet Take 1/2 to 1 tablet twice daily as needed for vertigo (Patient taking differently: Take 12.5 mg by mouth two (2) times a day. Take 1/2 to 1 tablet twice daily as needed for vertigo) 60 Tab 3    fluticasone (FLONASE) 50 mcg/actuation nasal spray PLACE 2 SPRAYS IN BOTH NOSTRILS DAILY. 1 Bottle 3    propranoloL (INDERAL) 10 mg tablet Take 30 mg by mouth two (2) times a day.  latanoprost (XALATAN) 0.005 % ophthalmic solution Administer 1 Drop to both eyes nightly. After treatment position/precautions:  · Upright in bed  · wife at bedside    Total Treatment Duration:   Time In: 3186  Time Out: [facility].  Shey Quintana MS, CCC-SLP         Speech Language Pathologist          Acute Rehabilitation Services                   Contact: Gil

## 2021-08-08 ENCOUNTER — APPOINTMENT (OUTPATIENT)
Dept: ULTRASOUND IMAGING | Age: 86
DRG: 056 | End: 2021-08-08
Attending: INTERNAL MEDICINE
Payer: MEDICARE

## 2021-08-08 LAB
ANION GAP SERPL CALC-SCNC: 4 MMOL/L (ref 7–16)
BASOPHILS # BLD: 0 K/UL (ref 0–0.2)
BASOPHILS NFR BLD: 0 % (ref 0–2)
BUN SERPL-MCNC: 23 MG/DL (ref 8–23)
CALCIUM SERPL-MCNC: 8.6 MG/DL (ref 8.3–10.4)
CHLORIDE SERPL-SCNC: 107 MMOL/L (ref 98–107)
CK SERPL-CCNC: 314 U/L (ref 21–215)
CO2 SERPL-SCNC: 30 MMOL/L (ref 21–32)
CREAT SERPL-MCNC: 0.71 MG/DL (ref 0.8–1.5)
DIFFERENTIAL METHOD BLD: ABNORMAL
EOSINOPHIL # BLD: 0.1 K/UL (ref 0–0.8)
EOSINOPHIL NFR BLD: 1 % (ref 0.5–7.8)
ERYTHROCYTE [DISTWIDTH] IN BLOOD BY AUTOMATED COUNT: 13.3 % (ref 11.9–14.6)
GLUCOSE SERPL-MCNC: 119 MG/DL (ref 65–100)
HCT VFR BLD AUTO: 30.8 % (ref 41.1–50.3)
HGB BLD-MCNC: 10.2 G/DL (ref 13.6–17.2)
IMM GRANULOCYTES # BLD AUTO: 0.1 K/UL (ref 0–0.5)
IMM GRANULOCYTES NFR BLD AUTO: 1 % (ref 0–5)
LYMPHOCYTES # BLD: 0.8 K/UL (ref 0.5–4.6)
LYMPHOCYTES NFR BLD: 11 % (ref 13–44)
MCH RBC QN AUTO: 32.4 PG (ref 26.1–32.9)
MCHC RBC AUTO-ENTMCNC: 33.1 G/DL (ref 31.4–35)
MCV RBC AUTO: 97.8 FL (ref 79.6–97.8)
MONOCYTES # BLD: 0.5 K/UL (ref 0.1–1.3)
MONOCYTES NFR BLD: 7 % (ref 4–12)
NEUTS SEG # BLD: 5.6 K/UL (ref 1.7–8.2)
NEUTS SEG NFR BLD: 81 % (ref 43–78)
NRBC # BLD: 0.02 K/UL (ref 0–0.2)
PLATELET # BLD AUTO: 329 K/UL (ref 150–450)
PMV BLD AUTO: 9.5 FL (ref 9.4–12.3)
POTASSIUM SERPL-SCNC: 3.6 MMOL/L (ref 3.5–5.1)
RBC # BLD AUTO: 3.15 M/UL (ref 4.23–5.6)
SODIUM SERPL-SCNC: 141 MMOL/L (ref 138–145)
WBC # BLD AUTO: 6.9 K/UL (ref 4.3–11.1)

## 2021-08-08 PROCEDURE — 74011250637 HC RX REV CODE- 250/637: Performed by: INTERNAL MEDICINE

## 2021-08-08 PROCEDURE — 85025 COMPLETE CBC W/AUTO DIFF WBC: CPT

## 2021-08-08 PROCEDURE — 93971 EXTREMITY STUDY: CPT

## 2021-08-08 PROCEDURE — 97530 THERAPEUTIC ACTIVITIES: CPT

## 2021-08-08 PROCEDURE — 80048 BASIC METABOLIC PNL TOTAL CA: CPT

## 2021-08-08 PROCEDURE — 65270000029 HC RM PRIVATE

## 2021-08-08 PROCEDURE — 82550 ASSAY OF CK (CPK): CPT

## 2021-08-08 PROCEDURE — 36415 COLL VENOUS BLD VENIPUNCTURE: CPT

## 2021-08-08 PROCEDURE — 74011250637 HC RX REV CODE- 250/637: Performed by: NURSE PRACTITIONER

## 2021-08-08 PROCEDURE — 74011250636 HC RX REV CODE- 250/636: Performed by: NURSE PRACTITIONER

## 2021-08-08 PROCEDURE — 97535 SELF CARE MNGMENT TRAINING: CPT

## 2021-08-08 PROCEDURE — 97112 NEUROMUSCULAR REEDUCATION: CPT

## 2021-08-08 PROCEDURE — 74011000258 HC RX REV CODE- 258: Performed by: INTERNAL MEDICINE

## 2021-08-08 PROCEDURE — 74011250636 HC RX REV CODE- 250/636: Performed by: INTERNAL MEDICINE

## 2021-08-08 RX ORDER — BROMOCRIPTINE MESYLATE 2.5 MG/1
2.5 TABLET ORAL DAILY
Status: COMPLETED | OUTPATIENT
Start: 2021-08-08 | End: 2021-08-09

## 2021-08-08 RX ADMIN — ENOXAPARIN SODIUM 40 MG: 40 INJECTION SUBCUTANEOUS at 08:33

## 2021-08-08 RX ADMIN — CARBIDOPA AND LEVODOPA 1 TABLET: 25; 100 TABLET ORAL at 15:23

## 2021-08-08 RX ADMIN — Medication 10 ML: at 15:23

## 2021-08-08 RX ADMIN — Medication 10 ML: at 21:59

## 2021-08-08 RX ADMIN — CEFTRIAXONE 1 G: 1 INJECTION, POWDER, FOR SOLUTION INTRAMUSCULAR; INTRAVENOUS at 12:03

## 2021-08-08 RX ADMIN — Medication 10 ML: at 05:41

## 2021-08-08 RX ADMIN — PANTOPRAZOLE SODIUM 40 MG: 40 TABLET, DELAYED RELEASE ORAL at 05:41

## 2021-08-08 RX ADMIN — ACETAMINOPHEN 650 MG: 325 TABLET ORAL at 21:59

## 2021-08-08 RX ADMIN — CARBIDOPA AND LEVODOPA 1 TABLET: 25; 100 TABLET ORAL at 21:59

## 2021-08-08 RX ADMIN — CARBIDOPA AND LEVODOPA 1 TABLET: 25; 100 TABLET ORAL at 08:33

## 2021-08-08 RX ADMIN — CARBIDOPA AND LEVODOPA 1 TABLET: 10; 100 TABLET ORAL at 21:59

## 2021-08-08 RX ADMIN — BROMOCRIPTINE MESYLATE 2.5 MG: 2.5 TABLET ORAL at 15:25

## 2021-08-08 RX ADMIN — LISINOPRIL 5 MG: 5 TABLET ORAL at 08:33

## 2021-08-08 NOTE — PROGRESS NOTES
ACUTE PHYSICAL THERAPY GOALS:  (Developed with and agreed upon by patient and/or caregiver.)  (1.) Veronica Gonzalez  will move from supine to sit and sit to supine , scoot up and down and roll side to side with CONTACT GUARD ASSIST within 7 treatment day(s). (2.) Veronica Gonzalez will transfer from bed to chair and chair to bed with MINIMAL ASSIST using the least restrictive device within 7 treatment day(s). (3.) Veronica Gonzalez will ambulate with MINIMAL ASSIST for 50 feet with the least restrictive device within 7 treatment day(s). (4.) Veronica Gonzalez will perform standing static and dynamic balance activities x 15 minutes with CONTACT GUARD ASSIST to improve safety within 7 treatment day(s). (5.) Veronica Gonzalez will perform bilateral lower extremity exercises x 15 min for HEP with SUPERVISION to improve strength, endurance, and functional mobility within 7 treatment day(s). PHYSICAL THERAPY: Daily Note Treatment Day # 2    Veronica Gonzalez is a 81 y.o. male   PRIMARY DIAGNOSIS: 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[address]  Respiratory failure with hypoxia ([facility] [address] [J96.91]         ASSESSMENT:     REHAB RECOMMENDATIONS: CURRENT LEVEL OF FUNCTION:  (Most Recently Demonstrated)   Recommendation to date pending progress:  Setting:   Short-term Rehab  Equipment:    To Be Determined Bed Mobility:   Moderate Assistance x 2  Sit to Stand:   Moderate Assistance x 2  Transfers:   Not tested  Gait/Mobility:   Not tested     ASSESSMENT:  Mr. Patt Milan was supine on contact. Worked on bed mobility. He made progress by being able to stand once he was at EOB but required a x 2. Worked on sit to stand and marching in place. Returned to bed. Again worked on more bed mobility. SUBJECTIVE:   Mr. Patt Milan states \"I'm dizzy\" when he first sat up. SOCIAL HISTORY/ LIVING ENVIRONMENT: From [facility] as of 3 days ago. Per chart, lives w/ wife. 1 story home. [address]. Uses RW.      OBJECTIVE:     PAIN: VITAL SIGNS: LINES/DRAINS:   Pre Treatment:  Appeared comfortable  Post Treatment:   sitting /84  Standing /81 none  O2 Device: None (Room air)     MOBILITY: I Mod I S SBA CGA Min Mod Max Total  NT x2 Comments:   Bed Mobility    Rolling [] [] [] [] [] [] [x] [] [] [] [x]    Supine to Sit [] [] [] [] [] [] [x] [] [] [] [x]    Scooting [] [] [] [] [] [] [x] [] [] [] [x]    Sit to Supine [] [] [] [] [] [] [x] [] [] [] [x]    Transfers    Sit to Stand [] [] [] [] [] [] [x] [] [] [] [x]    Bed to Chair [] [] [] [] [] [] [] [] [] [] []    Stand to Sit [] [] [] [] [] [] [x] [] [] [] [x]    I=Independent, Mod I=Modified Independent, S=Supervision, SBA=Standby Assistance, CGA=Contact Guard Assistance,   Min=Minimal Assistance, Mod=Moderate Assistance, Max=Maximal Assistance, Total=Total Assistance, NT=Not Tested    BALANCE: Good Fair+ Fair Fair- Poor NT Comments   Sitting Static [] [] [x] [] [] []    Sitting Dynamic [] [] [x] [] [] []              Standing Static [] [] [] [] [x] []    Standing Dynamic [] [] [] [] [x] []      GAIT: I Mod I S SBA CGA Min Mod Max Total  NT x2 Comments:   Level of Assistance [] [] [] [] [] [] [] [] [] [] []    Distance n/a    DME Rolling Walker for standing and marching    Gait Quality     Weightbearing  Status N/A     I=Independent, Mod I=Modified Independent, S=Supervision, SBA=Standby Assistance, CGA=Contact Guard Assistance,   Min=Minimal Assistance, Mod=Moderate Assistance, Max=Maximal Assistance, Total=Total Assistance, NT=Not Tested    PLAN:   FREQUENCY/DURATION: PT Plan of Care: 3 times/week for duration of hospital stay or until stated goals are met, whichever comes first.  TREATMENT:     TREATMENT:   ($$ Therapeutic Activity: 53-67 mins    )  Co-Treatment PT/OT necessary due to patient's decreased overall endurance/tolerance levels, as well as need for high level skilled assistance to complete functional transfers/mobility and functional tasks  Therapeutic Activity (53 Minutes): Therapeutic activity included Rolling, Supine to Sit, Sit to Supine, Scooting, Sitting balance  and Standing balance to improve functional Mobility, Strength and Activity tolerance.     TREATMENT GRID:  N/A    AFTER TREATMENT POSITION/PRECAUTIONS:  Bed, Needs within reach and RN notified    INTERDISCIPLINARY COLLABORATION:  RN/PCT, PT/PTA and OT/NORRIS    TOTAL TREATMENT DURATION:  PT Patient Time In/Time Out  Time In: 0375  Time Out: Candice 35, PTA

## 2021-08-08 NOTE — PROGRESS NOTES
LMSW follow up with spouse at bedside. SNF list provided and spouse has chosen referrals to [facility], [facility] and [facility] as she prefers pt to be in [facility] area. Referrals sent, await bed offer.

## 2021-08-08 NOTE — PROGRESS NOTES
Patient bladder scanned. 317cc noted in bladder. Physician notified. Order to be put  For in and out cath.

## 2021-08-08 NOTE — PROGRESS NOTES
ACUTE OT GOALS:  (Developed with and agreed upon by patient and/or caregiver.)    1. Patient will be alert and follow simple one step commands 75% of the time to improve participation with ADL. 2. Patient will complete bed mobility and sitting edge of bed with supervision to improve positioning for ADL. 3. Patient will tolerate 23 minutes of OT treatment with 2-3 rest breaks to increase activity tolerance for ADLs. 4. Patient will complete grooming tasks with minimal assistance to improve independence with self-care. 5. Patient will attempt transfers to the chair/BSC within 3 visits to demonstrate advancement with functional transfers. OCCUPATIONAL THERAPY: Daily Note OT Treatment Day # 2    Kenyatta Brunner is a 81 y.o. male   PRIMARY DIAGNOSIS: 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[facility] [address]  Respiratory failure with hypoxia ([facility] [address] [J96.91]       [place] / [place] / Product Type: Medicare /   ASSESSMENT:     REHAB RECOMMENDATIONS: CURRENT LEVEL OF FUNCTION:  (Most Recently Demonstrated)   Recommendation to date pending progress:  Setting:   Short-term Rehab  Equipment:    Rolling Walker   To Be Determined Bathing:   Not tested  Dressing:   Total Assistance  Feeding/Grooming:   Not tested  Toileting:   Total Assistance  Functional Mobility:   Not tested     ASSESSMENT:  Mr. Suma Banks presents in supine upon arrival. Pt confused and talking nonsensically. Pt completed bed mobility with mod a and additional time. Pt had bowel movement and required total assist with toileting and LB dressing. Pt sat edge of bed and worked on increasing sitting balance and tolerance. Pt stood with and worked on postural alignment, activity tolerance, and standing balance. Pt had another bowel movement and was assisted again with maryjo care and brief change. Minimal progress. Continue POC. SUBJECTIVE:   Mr. Suma Banks states, \"That hurts. \"    SOCIAL HISTORY/LIVING ENVIRONMENT:        OBJECTIVE: PAIN: VITAL SIGNS: LINES/DRAINS:   Pre Treatment: Pain Screen  Pain Scale 1: Visual  Pain Location 1: Rectal  Pain Description 1: Sore  Pain Intervention(s) 1: Nurse notified  Post Treatment: same   none  O2 Device: None (Room air)     ACTIVITIES OF DAILY LIVING: I Mod I S SBA CGA Min Mod Max Total NT Comments   BASIC ADLs:              Bathing/ Showering [] [] [] [] [] [] [] [] [] [x]    Toileting [] [] [] [] [] [] [] [] [x] []    Dressing [] [] [] [] [] [] [] [] [x] []    Feeding [] [] [] [] [] [] [] [] [] [x]    Grooming [] [] [] [] [] [] [] [] [] [x]    Personal Device Care [] [] [] [] [] [] [] [] [] [x]    Functional Mobility [] [] [] [] [] [] [] [] [] [x]    I=Independent, Mod I=Modified Independent, S=Supervision, SBA=Standby Assistance, CGA=Contact Guard Assistance,   Min=Minimal Assistance, Mod=Moderate Assistance, Max=Maximal Assistance, Total=Total Assistance, NT=Not Tested    MOBILITY: I Mod I S SBA CGA Min Mod Max Total  NT x2 Comments:   Supine to sit [] [] [] [] [] [] [x] [] [] [] []    Sit to supine [] [] [] [] [] [] [x] [] [] [] [x]    Sit to stand [] [] [] [] [] [] [x] [] [] [] [x]    Bed to chair [] [] [] [] [] [] [] [] [] [x] []    I=Independent, Mod I=Modified Independent, S=Supervision, SBA=Standby Assistance, CGA=Contact Guard Assistance,   Min=Minimal Assistance, Mod=Moderate Assistance, Max=Maximal Assistance, Total=Total Assistance, NT=Not Tested    BALANCE: Good Fair+ Fair Fair- Poor NT Comments   Sitting Static [] [x] [] [] [] []    Sitting Dynamic [] [] [x] [] [] []              Standing Static [] [] [] [x] [] []    Standing Dynamic [] [] [] [] [x] []      PLAN:   FREQUENCY/DURATION: OT Plan of Care: 3 times/week for duration of hospital stay or until stated goals are met, whichever comes first.    TREATMENT:   TREATMENT:   ($$ Self Care/Home Management: 8-22 mins$$ Neuromuscular Re-Education: 38-52 mins   )  Co-Treatment PT/OT necessary due to patient's decreased overall endurance/tolerance levels, as well as need for high level skilled assistance to complete functional transfers/mobility and functional tasks  Self Care (15 Minutes): Self care including Toileting and Lower Body Dressing to increase independence and decrease level of assistance required. Neuromuscular Re-education (38 Minutes): Neuromuscular Re-education included Balance Training, Postural training, Sitting balance training and Standing balance training to improve Balance, Functional Mobility and Postural Control.     TREATMENT GRID:  N/A    AFTER TREATMENT POSITION/PRECAUTIONS:  Alarm Activated, Chair, Needs within reach and RN notified    INTERDISCIPLINARY COLLABORATION:  RN/PCT, PT/PTA and OT/NORRIS    TOTAL TREATMENT DURATION:  OT Patient Time In/Time Out  Time In: 1345  Time Out: [address] [place]

## 2021-08-08 NOTE — PROGRESS NOTES
Hourly rounds performed through shift, pt denies needs at this time. Assisted in making phone call to wife. Bed in low position, locked and call light/personal items within reach. Will continue to monitor and report to oncoming nurse.

## 2021-08-08 NOTE — PROGRESS NOTES
Hospitalist Progress Note   Admit Date:  2021 10:36 AM   Name:  Mala England   Age:  81 y.o. Sex:  male  :  1935   MRN:  [id number]     Presenting Complaint: Unresponsive    Reason(s) for Admission: Respiratory failure with hypoxia [facility] Course & Interval History:     Mala England is a 81 y.o. male with history of parkinson dementia, parkinson disease, peptic ulcer disease, anorexia who was admitted for acute metabolic encephalopathy. He presented as a transfer from facility. He had become acutely altered and was intubated. He was briefly in the ICU from -. On  he was extubated and stable for transfer to the floor where we resume care. Of note he has a DNR order. He he remains obtunded and neurology is consulted. Neurology is working under the diagnosis of rapidly progressive Parkinson disease and is ruling out other possible etiologies. He has chronic anemia and mild glucose impairment. He is requiring minimal medical support for these derangements. Nutrition has been consulted due to a BMI of 16 and the fact that he is currently on tube feeds through an NG tube. Subjective (21):    Remains alert today. No acute concerns. Wife and son at bedside. Questions answered      Assessment & Plan:   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[address]  Altered mental status since transfer from facility. 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 outpt     Tremor  -Carbidopa levodopa     Required emergency intubation  -s/p course of rocephin/azithro     PUD (peptic ulcer disease)  -pantoprazole for omeprazole     GERD (gastroesophageal reflux disease)  -Famotidine     Severe protein-calorie malnutrition (HCC)  BMI 16.2  -nutrition consult   -speech has cleared for diet  - tolerating modified diet and pills    Hypophosphetemia  - replete as per nutrition replacement guidelines.      Weight loss  -nutrition consult  -PT, OT, PPD, CM  -speech has cleared for diet on 8/6, NGT removed    Dispo/Discharge Planning:  Pending, prior to stay at [facility] was living with wife and using RW for ambulation. Will need STR at discharge. CM following.  Patient is MEDICALLY STABLE      Diet:  ADULT ORAL NUTRITION SUPPLEMENT Breakfast, Lunch, Dinner; Standard High Calorie/High Protein  ADULT DIET Dysphagia - Soft & Bite Sized  DVT PPx: lovenox    Code status: DNR    Active Hospital Problems    Diagnosis Date Noted    Required emergency intubation 08/04/2021     Priority: 4 - Four    Severe protein-calorie malnutrition ([facility] [address] 08/04/2021     Priority: 6 - Six    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[facility] 06/24/2021    Tremor 07/11/2013    Weight loss 01/18/2013    Monoclonal paraproteinemia 09/04/2012    GERD (gastroesophageal reflux disease) 09/04/2012    PUD (peptic ulcer disease) 09/04/2012       Objective:     Patient Vitals for the past 24 hrs:   Temp Pulse Resp BP SpO2   08/08/21 1600  77      08/08/21 1554 99 °F (37.2 °C) 75 18 132/74 94 %   08/08/21 1200  82      08/08/21 1156 97.7 °F (36.5 °C) 78 20 (!) 140/77 98 %   08/08/21 0800  94      08/08/21 0711   18     08/08/21 0710 97.6 °F (36.4 °C) 75  (!) 143/76 97 %   08/08/21 0355 98.2 °F (36.8 °C) 82 20 (!) 158/92 91 %   08/07/21 2245 98.5 °F (36.9 °C) 81 20 (!) 147/82 94 %   08/07/21 1725  82        Oxygen Therapy  O2 Sat (%): 94 % (08/08/21 1554)  Pulse via Oximetry: 77 beats per minute (08/04/21 0931)  O2 Device: None (Room air) (08/08/21 1554)  Skin Assessment: Clean, dry, & intact (08/05/21 8422)  Skin Protection for O2 Device: No (08/04/21 1603)  Orientation: Bilateral (08/03/21 0701)  Location: Cheek (08/03/21 0701)  Interventions: Mouth Care (08/03/21 0701)  O2 Flow Rate (L/min): 2 l/min (08/05/21 0707)  FIO2 (%): 29 % (08/03/21 1001)    Estimated body mass index is 16.76 kg/m² as calculated from the following:    Height as of this encounter: 5' 10\" (1.778 m). Weight as of this encounter: 53 kg (116 lb 12.8 oz). No intake or output data in the 24 hours ending 08/08/21 1654      Physical Exam:     General:    malnourished. No overt distress more alert  Head:  Normocephalic, atraumatic NGT in place  Eyes:  Sclerae appear normal.  Pupils equally round. Sunken orbits  HENT:  Nares appear normal, no drainage. Moist mucous membranes  Neck:  No restricted ROM. Trachea midline  CV:   RRR. No m/r/g. No JVD  Lungs:   CTAB. No wheezing, rhonchi, or rales. Appears even, unlabored  Abdomen: Bowel sounds present. Soft, nontender, nondistended. Extremities: Warm and dry. No cyanosis or clubbing. No edema. Skin:     No rashes. Normal turgor. Normal coloration  Neuro:  Cranial nerves II-XII grossly intact. Sensation intact  Psych:  Alert, oriented to place only    Data Ordered and Personally Reviewed:    Last 24hr Labs:  Recent Results (from the past 24 hour(s))   CK    Collection Time: 08/08/21  6:10 AM   Result Value Ref Range     (H) 21 - 215 U/L   CBC WITH AUTOMATED DIFF    Collection Time: 08/08/21  6:10 AM   Result Value Ref Range    WBC 6.9 4.3 - 11.1 K/uL    RBC 3.15 (L) 4.23 - 5.6 M/uL    HGB 10.2 (L) 13.6 - 17.2 g/dL    HCT 30.8 (L) 41.1 - 50.3 %    MCV 97.8 79.6 - 97.8 FL    MCH 32.4 26.1 - 32.9 PG    MCHC 33.1 31.4 - 35.0 g/dL    RDW 13.3 11.9 - 14.6 %    PLATELET 929 705 - 027 K/uL    MPV 9.5 9.4 - 12.3 FL    ABSOLUTE NRBC 0.02 0.0 - 0.2 K/uL    DF AUTOMATED      NEUTROPHILS 81 (H) 43 - 78 %    LYMPHOCYTES 11 (L) 13 - 44 %    MONOCYTES 7 4.0 - 12.0 %    EOSINOPHILS 1 0.5 - 7.8 %    BASOPHILS 0 0.0 - 2.0 %    IMMATURE GRANULOCYTES 1 0.0 - 5.0 %    ABS. NEUTROPHILS 5.6 1.7 - 8.2 K/UL    ABS. LYMPHOCYTES 0.8 0.5 - 4.6 K/UL    ABS. MONOCYTES 0.5 0.1 - 1.3 K/UL    ABS.  EOSINOPHILS 0.1 0.0 - 0.8 K/UL    ABS. BASOPHILS 0.0 0.0 - 0.2 K/UL    ABS. IMM. GRANS. 0.1 0.0 - 0.5 K/UL   METABOLIC PANEL, BASIC    Collection Time: 08/08/21  6:10 AM   Result Value Ref Range    Sodium 141 138 - 145 mmol/L    Potassium 3.6 3.5 - 5.1 mmol/L    Chloride 107 98 - 107 mmol/L    CO2 30 21 - 32 mmol/L    Anion gap 4 (L) 7 - 16 mmol/L    Glucose 119 (H) 65 - 100 mg/dL    BUN 23 8 - 23 MG/DL    Creatinine 0.71 (L) 0.8 - 1.5 MG/DL    GFR est AA >60 >60 ml/min/1.73m2    GFR est non-AA >60 >60 ml/min/1.73m2    Calcium 8.6 8.3 - 10.4 MG/DL       All Micro Results     Procedure Component Value Units Date/Time    CULTURE, BLOOD [[id number]] Collected: 08/01/21 1622    Order Status: Completed Specimen: Blood Updated: 08/06/21 0752     Special Requests: --        RIGHT  ARM       Culture result: NO GROWTH 5 DAYS       CULTURE, BLOOD [[id number]] Collected: 08/01/21 1519    Order Status: Completed Specimen: Blood Updated: 08/06/21 0752     Special Requests: --        RIGHT  Antecubital       Culture result: NO GROWTH 5 DAYS       COVID-19 RAPID TEST [[id number]] Collected: 08/01/21 1056    Order Status: Completed Specimen: Nasopharyngeal Updated: 08/01/21 1258     Specimen source NASAL        COVID-19 rapid test Not detected        Comment:      The specimen is NEGATIVE for SARS-CoV-2, the novel coronavirus associated with COVID-19. A negative result does not rule out COVID-19. This test has been authorized by the FDA under an Emergency Use Authorization (EUA) for use by authorized laboratories. Fact sheet for Healthcare Providers: ConventionUpdate.co.nz  Fact sheet for Patients: ConventionUpdate.co.nz       Methodology: Isothermal Nucleic Acid Amplification               Other Studies:  DUPLEX UPPER EXT VENOUS LEFT    Result Date: 8/8/2021  LEFT UPPER EXTREMITY DOPPLER ULTRASOUND 8/8/2021 HISTORY: Mild left arm swelling yesterday.  TECHNIQUE: Grayscale, color Doppler and pulse wave Doppler imaging of the deep veins of the left arm was performed from the neck to the elbow. COMPARISON: None FINDINGS: The right innominate and subclavian veins are patent. An IV is present in the left cephalic vein at the level of the distal humerus. Superficial thrombus is present in the left cephalic vein at the level of the mid forearm. There is no DVT in left arm. The left internal jugular vein, innominate vein, subclavian vein, axillary vein, basilic vein, brachial, radial and ulnar veins are patent. No DVT in the left arm. See above report for details. Current Meds:  Current Facility-Administered Medications   Medication Dose Route Frequency    bromocriptine (PARLODEL) tablet 2.5 mg  2.5 mg Oral DAILY    carbidopa-levodopa (SINEMET)  mg per tablet 1 Tablet  1 Tablet Oral TID    lisinopriL (PRINIVIL, ZESTRIL) tablet 5 mg  5 mg Oral DAILY    pantoprazole (PROTONIX) tablet 40 mg  40 mg Oral ACB    carbidopa-levodopa (SINEMET)  mg per tablet 1 Tablet  1 Tablet Per NG tube QHS    sodium chloride (NS) flush 5-40 mL  5-40 mL IntraVENous Q8H    sodium chloride (NS) flush 5-40 mL  5-40 mL IntraVENous PRN    acetaminophen (TYLENOL) tablet 650 mg  650 mg Oral Q6H PRN    Or    acetaminophen (TYLENOL) suppository 650 mg  650 mg Rectal Q6H PRN    polyethylene glycol (MIRALAX) packet 17 g  17 g Oral DAILY PRN    ondansetron (ZOFRAN ODT) tablet 4 mg  4 mg Oral Q8H PRN    Or    ondansetron (ZOFRAN) injection 4 mg  4 mg IntraVENous Q6H PRN    enoxaparin (LOVENOX) injection 40 mg  40 mg SubCUTAneous DAILY    NUTRITIONAL SUPPORT ELECTROLYTE PRN ORDERS   Does Not Apply PRN       Signed:  Wilbur Noble DO    Part of this note may have been written by using a voice dictation software. The note has been proof read but may still contain some grammatical/other typographical errors.

## 2021-08-08 NOTE — PROGRESS NOTES
Neurology Daily Progress Note     Assessment:     18-year-old man with worsening parkinsonism over the last 6 months. Presented with increased muscle tone, but not leadpipe rigidity, and myoclonus. CK and myoglobin were both elevated, CK improving. 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 No evidence of potent neuroleptic administration, which could also cause this result. EEG showed slowing consistent with a moderate to severe degree of encephalopathy. Continue Sinemet. Will decrease bromocriptine 2.5 mg po daily, will plan to discontinue in AM.      No additional neurological workup is needed at this time. He already has standing appointment with Dr. Elena Bond at [facility]. Will sign off. Plan:     Continue Sinemet     Decrease bromocriptine 2.5 mg po daily. Will plan to discontinue in AM.    Orthostatic BP and HR    Continue PT/OT    Continue to trend CK levels    Management of Delirium     Non-pharmacological intervention  · Reorient the patient throughout the day  · Window open and lights on during the day. Lights off, television off, noises down during the night. If able, decrease nursing checks at night  · Therapies as often as possible  · Avoid restraints to the best of your ability   · Avoid sensory deprivation by using glasses and hearing aids, if applicable       Pharmacological intervention  · Replete electrolyte abnormalities and correct metabolic abnormalities  · Limit benzodiazepines, antihistamines, narcotics, anticholinergics. Preference towards Precedex for sedation over fentanyl and benzodiazepines/GABAa agonists. · For dangerous behavior/aggression to self or others can consider low dose Zyprexa or Seroquel if benefits outweigh risk. The patient should not be given typical antipsychotics such as Haldol or Geodon. · For persistent insomnia can use melatonin four hours prior to bedtime       Subjective:         Interval history:    Alert and oriented to person and place. Able to follow commands, moving all extremities against gravity. Bradyphrenic and bradykinetic. . Continue Sinemet and decrease Bromocriptine 2.5 mg po daily. History:    Verónica Conley is a 81 y.o. male who is being seen for PD and rigidity. Unable to obtain ROS due to AMS.         Objective:     Vitals:    08/08/21 0626 08/08/21 0710 08/08/21 0711 08/08/21 0800   BP:  (!) 143/76     Pulse:  75  94   Resp:   18    Temp:  97.6 °F (36.4 °C)     SpO2:  97%     Weight: 116 lb 12.8 oz (53 kg)      Height:              Current Facility-Administered Medications:     bromocriptine (PARLODEL) tablet 2.5 mg, 2.5 mg, Oral, DAILY, Stefany Naik APRN    carbidopa-levodopa (SINEMET)  mg per tablet 1 Tablet, 1 Tablet, Oral, TID, Villa England DO, 1 Tablet at 08/08/21 0359    lisinopriL (PRINIVIL, ZESTRIL) tablet 5 mg, 5 mg, Oral, DAILY, Estefani Garcia DO, 5 mg at 08/08/21 1875    pantoprazole (PROTONIX) tablet 40 mg, 40 mg, Oral, ACB, Estefani Garcia, DO, 40 mg at 08/08/21 0541    cefTRIAXone (ROCEPHIN) 1 g in 0.9% sodium chloride (MBP/ADV) 50 mL MBP, 1 g, IntraVENous, Q24H, Jie Medellin MD, Last Rate: 100 mL/hr at 08/07/21 0923, 1 g at 08/07/21 0923    carbidopa-levodopa (SINEMET)  mg per tablet 1 Tablet, 1 Tablet, Per NG tube, QHS, Estefani Garcia DO, 1 Tablet at 08/06/21 2128    sodium chloride (NS) flush 5-40 mL, 5-40 mL, IntraVENous, Q8H, Kianna Pettit, NP, 10 mL at 08/08/21 0541    sodium chloride (NS) flush 5-40 mL, 5-40 mL, IntraVENous, PRN, Radha WITT, NP    acetaminophen (TYLENOL) tablet 650 mg, 650 mg, Oral, Q6H PRN, 650 mg at 08/07/21 1616 **OR** acetaminophen (TYLENOL) suppository 650 mg, 650 mg, Rectal, Q6H PRN, Radha WITT, NP    polyethylene glycol (MIRALAX) packet 17 g, 17 g, Oral, DAILY PRN, Radha WITT, NP    ondansetron (ZOFRAN ODT) tablet 4 mg, 4 mg, Oral, Q8H PRN **OR** ondansetron (ZOFRAN) injection 4 mg, 4 mg, IntraVENous, Q6H PRN, Vicky WITT NP    enoxaparin (LOVENOX) injection 40 mg, 40 mg, SubCUTAneous, DAILY, Vicky WITT NP, 40 mg at 08/08/21 6760    NUTRITIONAL SUPPORT ELECTROLYTE PRN ORDERS, , Does Not Apply, PRN, Live Blanco MD    Recent Results (from the past 12 hour(s))   CK    Collection Time: 08/08/21  6:10 AM   Result Value Ref Range     (H) 21 - 215 U/L   CBC WITH AUTOMATED DIFF    Collection Time: 08/08/21  6:10 AM   Result Value Ref Range    WBC 6.9 4.3 - 11.1 K/uL    RBC 3.15 (L) 4.23 - 5.6 M/uL    HGB 10.2 (L) 13.6 - 17.2 g/dL    HCT 30.8 (L) 41.1 - 50.3 %    MCV 97.8 79.6 - 97.8 FL    MCH 32.4 26.1 - 32.9 PG    MCHC 33.1 31.4 - 35.0 g/dL    RDW 13.3 11.9 - 14.6 %    PLATELET 026 724 - 743 K/uL    MPV 9.5 9.4 - 12.3 FL    ABSOLUTE NRBC 0.02 0.0 - 0.2 K/uL    DF AUTOMATED      NEUTROPHILS 81 (H) 43 - 78 %    LYMPHOCYTES 11 (L) 13 - 44 %    MONOCYTES 7 4.0 - 12.0 %    EOSINOPHILS 1 0.5 - 7.8 %    BASOPHILS 0 0.0 - 2.0 %    IMMATURE GRANULOCYTES 1 0.0 - 5.0 %    ABS. NEUTROPHILS 5.6 1.7 - 8.2 K/UL    ABS. LYMPHOCYTES 0.8 0.5 - 4.6 K/UL    ABS. MONOCYTES 0.5 0.1 - 1.3 K/UL    ABS. EOSINOPHILS 0.1 0.0 - 0.8 K/UL    ABS. BASOPHILS 0.0 0.0 - 0.2 K/UL    ABS. IMM. GRANS. 0.1 0.0 - 0.5 K/UL   METABOLIC PANEL, BASIC    Collection Time: 08/08/21  6:10 AM   Result Value Ref Range    Sodium 141 138 - 145 mmol/L    Potassium 3.6 3.5 - 5.1 mmol/L    Chloride 107 98 - 107 mmol/L    CO2 30 21 - 32 mmol/L    Anion gap 4 (L) 7 - 16 mmol/L    Glucose 119 (H) 65 - 100 mg/dL    BUN 23 8 - 23 MG/DL    Creatinine 0.71 (L) 0.8 - 1.5 MG/DL    GFR est AA >60 >60 ml/min/1.73m2    GFR est non-AA >60 >60 ml/min/1.73m2    Calcium 8.6 8.3 - 10.4 MG/DL         Physical Exam:  General -elderly male. Facial tremor  HEENT - Normocephalic, atraumatic. Conjunctiva, tympanic membranes, and oropharynx are clear.    Neck - Supple without masses, no bruits Extremities - Peripheral pulses intact. No edema and no rashes. Neurological examination - Comprehension, attention, memory and reasoning are impaired. Language and speech are bradyphrenic. On cranial nerve examination, (II, III, IV, VI) pupils are equal, round, and reactive to light. Visual acuity is adequate. Visual fields are full to finger confrontation. Extraocular motility is normal. (V, VII) Face is symmetric and sensation is intact to light touch. (VIII) Hearing is intact. (IX, X) Palate and uvula elevate symmetrically. Voice is normal. (XI) Shoulder shrug is strong and equal bilaterally. (XII)Tongue is midline. Motor examination - There is normal muscle tone and bulk. Moves all extremities against gravity and to commands. Muscle stretch reflexes 1+ throughout. Bilateral action tremor on right greater than left. Plantar response is flexor. Sensation is intact to light touch, pinprick in all extremities. Unable to assess cerebellar examination or gait/stance.        Signed By: ARIEL Gomez     August 8, 2021

## 2021-08-09 PROBLEM — E44.0 MODERATE PROTEIN-CALORIE MALNUTRITION (HCC): Status: ACTIVE | Noted: 2021-08-04

## 2021-08-09 LAB
ANION GAP SERPL CALC-SCNC: 4 MMOL/L (ref 7–16)
B PERT DNA SPEC QL NAA+PROBE: NOT DETECTED
BASOPHILS # BLD: 0 K/UL (ref 0–0.2)
BASOPHILS NFR BLD: 0 % (ref 0–2)
BORDETELLA PARAPERTUSSIS PCR, BORPAR: NOT DETECTED
BUN SERPL-MCNC: 20 MG/DL (ref 8–23)
C PNEUM DNA SPEC QL NAA+PROBE: NOT DETECTED
CALCIUM SERPL-MCNC: 8.3 MG/DL (ref 8.3–10.4)
CHLORIDE SERPL-SCNC: 109 MMOL/L (ref 98–107)
CK SERPL-CCNC: 309 U/L (ref 21–215)
CO2 SERPL-SCNC: 28 MMOL/L (ref 21–32)
CREAT SERPL-MCNC: 0.65 MG/DL (ref 0.8–1.5)
DIFFERENTIAL METHOD BLD: ABNORMAL
EOSINOPHIL # BLD: 0.1 K/UL (ref 0–0.8)
EOSINOPHIL NFR BLD: 1 % (ref 0.5–7.8)
ERYTHROCYTE [DISTWIDTH] IN BLOOD BY AUTOMATED COUNT: 13.3 % (ref 11.9–14.6)
FLUAV SUBTYP SPEC NAA+PROBE: NOT DETECTED
FLUBV RNA SPEC QL NAA+PROBE: NOT DETECTED
GLUCOSE SERPL-MCNC: 105 MG/DL (ref 65–100)
HADV DNA SPEC QL NAA+PROBE: NOT DETECTED
HCOV 229E RNA SPEC QL NAA+PROBE: NOT DETECTED
HCOV HKU1 RNA SPEC QL NAA+PROBE: NOT DETECTED
HCOV NL63 RNA SPEC QL NAA+PROBE: NOT DETECTED
HCOV OC43 RNA SPEC QL NAA+PROBE: NOT DETECTED
HCT VFR BLD AUTO: 32.5 % (ref 41.1–50.3)
HGB BLD-MCNC: 10.6 G/DL (ref 13.6–17.2)
HMPV RNA SPEC QL NAA+PROBE: NOT DETECTED
HPIV1 RNA SPEC QL NAA+PROBE: NOT DETECTED
HPIV2 RNA SPEC QL NAA+PROBE: NOT DETECTED
HPIV3 RNA SPEC QL NAA+PROBE: NOT DETECTED
HPIV4 RNA SPEC QL NAA+PROBE: NOT DETECTED
IMM GRANULOCYTES # BLD AUTO: 0.1 K/UL (ref 0–0.5)
IMM GRANULOCYTES NFR BLD AUTO: 1 % (ref 0–5)
LYMPHOCYTES # BLD: 0.9 K/UL (ref 0.5–4.6)
LYMPHOCYTES NFR BLD: 14 % (ref 13–44)
M PNEUMO DNA SPEC QL NAA+PROBE: NOT DETECTED
MAGNESIUM SERPL-MCNC: 2.3 MG/DL (ref 1.8–2.4)
MCH RBC QN AUTO: 32.2 PG (ref 26.1–32.9)
MCHC RBC AUTO-ENTMCNC: 32.6 G/DL (ref 31.4–35)
MCV RBC AUTO: 98.8 FL (ref 79.6–97.8)
MONOCYTES # BLD: 0.5 K/UL (ref 0.1–1.3)
MONOCYTES NFR BLD: 7 % (ref 4–12)
NEUTS SEG # BLD: 5.2 K/UL (ref 1.7–8.2)
NEUTS SEG NFR BLD: 77 % (ref 43–78)
NRBC # BLD: 0 K/UL (ref 0–0.2)
PHOSPHATE SERPL-MCNC: 3.3 MG/DL (ref 2.3–3.7)
PLATELET # BLD AUTO: 331 K/UL (ref 150–450)
PMV BLD AUTO: 9.5 FL (ref 9.4–12.3)
POTASSIUM SERPL-SCNC: 3.5 MMOL/L (ref 3.5–5.1)
RBC # BLD AUTO: 3.29 M/UL (ref 4.23–5.6)
RSV RNA SPEC QL NAA+PROBE: NOT DETECTED
RV+EV RNA SPEC QL NAA+PROBE: NOT DETECTED
SARS-COV-2 PCR, COVPCR: NOT DETECTED
SODIUM SERPL-SCNC: 141 MMOL/L (ref 136–145)
WBC # BLD AUTO: 6.8 K/UL (ref 4.3–11.1)

## 2021-08-09 PROCEDURE — 74011250636 HC RX REV CODE- 250/636: Performed by: INTERNAL MEDICINE

## 2021-08-09 PROCEDURE — 82550 ASSAY OF CK (CPK): CPT

## 2021-08-09 PROCEDURE — 65270000029 HC RM PRIVATE

## 2021-08-09 PROCEDURE — 36415 COLL VENOUS BLD VENIPUNCTURE: CPT

## 2021-08-09 PROCEDURE — 92526 ORAL FUNCTION THERAPY: CPT

## 2021-08-09 PROCEDURE — 97530 THERAPEUTIC ACTIVITIES: CPT

## 2021-08-09 PROCEDURE — 83735 ASSAY OF MAGNESIUM: CPT

## 2021-08-09 PROCEDURE — 74011250637 HC RX REV CODE- 250/637: Performed by: INTERNAL MEDICINE

## 2021-08-09 PROCEDURE — 74011250636 HC RX REV CODE- 250/636: Performed by: NURSE PRACTITIONER

## 2021-08-09 PROCEDURE — 74011250637 HC RX REV CODE- 250/637: Performed by: NURSE PRACTITIONER

## 2021-08-09 PROCEDURE — 85025 COMPLETE CBC W/AUTO DIFF WBC: CPT

## 2021-08-09 PROCEDURE — 0202U NFCT DS 22 TRGT SARS-COV-2: CPT

## 2021-08-09 PROCEDURE — 84100 ASSAY OF PHOSPHORUS: CPT

## 2021-08-09 PROCEDURE — 80048 BASIC METABOLIC PNL TOTAL CA: CPT

## 2021-08-09 RX ORDER — ONDANSETRON 2 MG/ML
4 INJECTION INTRAMUSCULAR; INTRAVENOUS ONCE
Status: COMPLETED | OUTPATIENT
Start: 2021-08-09 | End: 2021-08-09

## 2021-08-09 RX ADMIN — LISINOPRIL 5 MG: 5 TABLET ORAL at 08:59

## 2021-08-09 RX ADMIN — ONDANSETRON 4 MG: 4 TABLET, ORALLY DISINTEGRATING ORAL at 13:21

## 2021-08-09 RX ADMIN — BROMOCRIPTINE MESYLATE 2.5 MG: 2.5 TABLET ORAL at 08:59

## 2021-08-09 RX ADMIN — CARBIDOPA AND LEVODOPA 1 TABLET: 25; 100 TABLET ORAL at 08:59

## 2021-08-09 RX ADMIN — Medication 10 ML: at 05:07

## 2021-08-09 RX ADMIN — CARBIDOPA AND LEVODOPA 1 TABLET: 25; 100 TABLET ORAL at 13:22

## 2021-08-09 RX ADMIN — ENOXAPARIN SODIUM 40 MG: 40 INJECTION SUBCUTANEOUS at 08:59

## 2021-08-09 RX ADMIN — ONDANSETRON 4 MG: 2 INJECTION INTRAMUSCULAR; INTRAVENOUS at 19:23

## 2021-08-09 RX ADMIN — CARBIDOPA AND LEVODOPA 1 TABLET: 25; 100 TABLET ORAL at 21:10

## 2021-08-09 RX ADMIN — CARBIDOPA AND LEVODOPA 1 TABLET: 10; 100 TABLET ORAL at 21:10

## 2021-08-09 RX ADMIN — Medication 10 ML: at 13:22

## 2021-08-09 RX ADMIN — Medication 10 ML: at 21:10

## 2021-08-09 RX ADMIN — PANTOPRAZOLE SODIUM 40 MG: 40 TABLET, DELAYED RELEASE ORAL at 06:33

## 2021-08-09 NOTE — PROGRESS NOTES
Hourly rounds complete this shift. Patient alert and oriented x4. No new complaints at this time. Bed in low, locked position, call light and bedside table within reach. Patient in bed resting. Prescribed medications given. Patient complained of nausea, prn medications and ginger ale given. IV clean, dry, and intact. Will continue to monitor. Report given to night shift nurse.

## 2021-08-09 NOTE — PROGRESS NOTES
Comprehensive Nutrition Assessment    Type and Reason for Visit: Reassess  Tube Feeding Management (Hospitalist)    Nutrition Recommendations/Plan:   Meals and Snacks:  Continue current diet. Modifications per SLP. Nutrition Supplement Therapy:   Medical food supplement therapy:  Continue Ensure Enlive three times per day (this provides 350 kcal and 20 grams protein per bottle)   Labs:   Mag and Phos d/c as pt is no longer on nutrition support. Malnutrition Assessment:  Malnutrition Status: Moderate malnutrition  Context: Chronic illness  Findings of clinical characteristics of malnutrition:   Energy Intake:  Unable to assess  Weight Loss:  Mild weight loss (specify amount and time period) (3-8% wt loss over the past yr)     Body Fat Loss:  7 - Severe body fat loss, Buccal region, Triceps   Muscle Mass Loss:  1 - Mild muscle mass loss, Temples (temporalis), Calf (gastrocnemius), Thigh (quadriceps) (moderate)  Fluid Accumulation:  Unable to assess,     Strength:  Not performed     Nutrition Assessment:   Nutrition History: Per initial assessmnet: Reported not to be eating or drinking at [facility] times 2 days. Review of IM office records indicate ongoing struggles with intake and weight since January. Mrs. Duff reports his intake was his baseline low prior to admission at [facility]. Ate all meals. Nutrition Background: PMH remarkable for bronchiectasis, GERD, TIAs, HLD, HTN, tremor, partial gastrectomy. Admitted with respiratory failure w hypoxia, intubated by EMS, atypical parkinsonism. Daily Update:  Visit with pt and wife at bedside. Pt is alert and oriented to person, place. Wife assists him with po intake. He ate 100% of the waffle this am, takes portions of oral supplement throughout the day. He has a hx of fluctuations between constipation and loose stool per wife. 6 stools recorded past 24 hours.     Lab Results   Component Value Date/Time    Sodium 141 08/09/2021 05:31 AM Potassium 3.5 08/09/2021 05:31 AM    Chloride 109 (H) 08/09/2021 05:31 AM    CO2 28 08/09/2021 05:31 AM    Anion gap 4 (L) 08/09/2021 05:31 AM    Glucose 105 (H) 08/09/2021 05:31 AM    BUN 20 08/09/2021 05:31 AM    Creatinine 0.65 (L) 08/09/2021 05:31 AM    Calcium 8.3 08/09/2021 05:31 AM    Albumin 2.4 (L) 08/06/2021 06:18 AM    Magnesium 2.3 08/09/2021 05:31 AM    Phosphorus 3.3 08/09/2021 05:31 AM    Labs are remarkable for corrected K, stable Mg and Phos. Nutrition Related Findings:   NG placed 8/1. TF started w transfer to ICU 8/1, extubated 8/3. TF changed and restarted 8/3, progressed to goal 8/4 at 0603. Pt transferred from ICU 8/4, TF was resumed at initation rate s/p transfer. FT replaced 8/5, TF resumed. Diet advanced per SLP 8/6.  TF d/c after pt self d/c FT 8/9.       Current Nutrition Therapies:  ADULT ORAL NUTRITION SUPPLEMENT Breakfast, Lunch, Dinner; Standard High Calorie/High Protein  ADULT DIET Dysphagia - Soft & Bite Sized    Current Intake:   Average Meal Intake: 26-50% Average Supplement Intake: 26-50%      Anthropometric Measures:  Height: 5' 10\" (177.8 cm)  Current Body Wt: 57.5 kg (126 lb 12.2 oz) (8/9), Weight source: Bed scale  BMI: 18.2, Underweight (BMI less than 22) age over 72  Admission Body Weight: 125 lb (source not specified)  Ideal Body Weight (lbs) (Calculated): 166 lbs (75 kg), 75.3 %  Usual Body Wt:  (Wt range x 1 year per IM records 54 kg to 56.8 kg), Percent weight change:            Edema: No data recorded   Estimated Daily Nutrient Needs:  Energy (kcal/day): 6681-1757 (Kcal/kg (30-35), Weight Used: Current (52.3 kg 8/3))  Protein (g/day): 63-78 (1.2-1.5 g/kg) Weight Used: (Current)  Fluid (ml/day):   (1 ml/kcal)    Nutrition Diagnosis:   · Inadequate oral intake related to cognitive or neurological impairment, altered GI structure (hx partial gastrectomy) as evidenced by  (diet per SLP, varied po, potentially meeting ~50% needs)    · Moderate malnutrition, In context of chronic illness related to cognitive or neurological impairment as evidenced by  (criteria identified in malnutrition assessment above)    Nutrition Interventions:   Food and/or Nutrient Delivery: Continue current diet, Continue oral nutrition supplement     Coordination of Nutrition Care: Continue to monitor while inpatient, Interdisciplinary rounds  Plan of Care discussed with Carol Medina RN.       Goals:   Previous Goal Met:  (Discontinued)  Active Goal: Meet >75% estimated needs by nutrition follow-up    Nutrition Monitoring and Evaluation:      Food/Nutrient Intake Outcomes: Food and nutrient intake, Supplement intake  Physical Signs/Symptoms Outcomes: Biochemical data, GI status, Meal time behavior, Weight    Discharge Planning:    Continue oral nutrition supplement    Corey Choudhary MA, RD, LDN on 8/9/2021 at 10:38 AM  Contact: [phone]

## 2021-08-09 NOTE — PROGRESS NOTES
LTG: Patient will tolerate least restrictive diet without overt signs or symptoms of airway compromise. STG: Patient will tolerate [soft and bite sized] diet with thin liquids without overt signs or symptoms of airway compromise. [upgraded 8/7/21]  STG: Patient will ongoing po trials to assess for diet advancement. SPEECH LANGUAGE PATHOLOGY: DYSPHAGIA- Daily Note 2    NAME/AGE/GENDER: Vira Niño is a 81 y.o. male  DATE: 8/9/2021  PRIMARY DIAGNOSIS: Respiratory failure with hypoxia ([facility] [address] [J96.91]      ICD-10: Treatment Diagnosis: R13.12 Dysphagia, Oropharyngeal Phase    RECOMMENDATIONS   DIET:    Soft and Bite-Sized   Thin Liquids    MEDICATIONS: With liquid     ASPIRATION PRECAUTIONS  · Slow rate of intake  · Small bites/sips  · Upright at 90 degrees during meal     COMPENSATORY STRATEGIES/MODIFICATIONS  · Assistance with po intake     RECOMMENDATIONS for CONTINUED SPEECH THERAPY:   YES: Anticipate need for ongoing speech therapy during this hospitalization. ASSESSMENT   Patient consuming prescribed diet, soft/bite sized and thin liquids, without overt s/sx airway compromise. Mildly prolonged, but functional oral prep with chewables. Appears to have difficulty initiating swallow at times, which patient confirms. Recommend continue soft and bite size diet with thin liquids. Will follow along for diet tolerance and po trials for potential diet advancement. Modified barium swallow study tomorrow, 8/9 to objectively assess oropharyngeal swallow function if patient is still hospitalized. If discharged today, can be completed at next level of care. Patient may also benefit from speech therapy evaluation to assess if appropriate for [facility] Voice Treatment (LSVT) at next level of care as patient noted to have reduced vocal intensity.     EDUCATION:  · Recommendations discussed with Patient, Family and hospitalist  CONTINUATION OF SKILLED SERVICES/MEDICAL NECESSITY:   Patient is expected to demonstrate progress in  swallow strength, swallow timeliness, swallow function, diet tolerance and swallow safety in order to  improve swallow safety, work toward diet advancement and decrease aspiration risk. REHABILITATION POTENTIAL FOR STATED GOALS: Good    PLAN    FREQUENCY/DURATION: Continue to follow patient 3 times a week for duration of hospital stay to address above goals. - Recommendations for next treatment session: Next treatment session will address Modified Barium Swallow Study    SUBJECTIVE   Patient alert upright in bed for session. Pleasant and friendly. Oxygen Device: room air  Pain: Pain Scale 1: Numeric (0 - 10)  Pain Intensity 1: 0    History of Present Injury/Illness: Mr. 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 [address] (8/1/2021), Tremor (7/11/2013), Vertigo (7/11/2013), Vitamin D deficiency (7/11/2013), and Weight loss (1/18/2013). Sulma Cuevas He also  has a past surgical history that includes hx orthopaedic (rt shoulder); hx heent (1994); hx gastrectomy (1960); hx cholecystectomy (2000); hx hernia repair (1992); and hx back surgery (1990).  PRECAUTIONS/ALLERGIES: Codeine and Gadolinium-containing contrast media     Problem List:  (Impairments causing functional limitations):  1. Oropharyngeal dysphagia  2. Previous Dysphagia: NONE REPORTED  Diet Prior to Evaluation: per speech recommendation minced and moist, but orders for puree    Orientation:  Person  \"hospital\" in [place] [address]   Dysphagia treatment:   Patient consumed trials of thin liquids by straw/serial sips and mixed fruit without overt s/sx airway compromise. Mildly prolonged, but functional oral prep with mixed fruit. Appearing to have difficulty intiating swallow, which patient confirms. Tool Used: Dysphagia Outcome and Severity Scale (TUYET)    Score Comments   Normal Diet  [] 7 With no strategies or extra time needed   Functional Swallow  [] 6 May have mild oral or pharyngeal delay   Mild Dysphagia  [] 5 Which may require one diet consistency restricted    Mild-Moderate Dysphagia  [] 4 With 1-2 diet consistencies restricted   Moderate Dysphagia  [] 3 With 2 or more diet consistencies restricted   Moderate-Severe Dysphagia  [] 2 With partial PO strategies (trials with ST only)   Severe Dysphagia  [] 1 With inability to tolerate any PO safely      Score:  Initial: 4 Most Recent: 5 (Date 08/09/21 )   Interpretation of Tool: The Dysphagia Outcome and Severity Scale (TUYET) is a simple, easy-to-use, 7-point scale developed to systematically rate the functional severity of dysphagia based on objective assessment and make recommendations for diet level, independence level, and type of nutrition. Current Medications:   No current facility-administered medications on file prior to encounter. Current Outpatient Medications on File Prior to Encounter   Medication Sig Dispense Refill    atorvastatin (LIPITOR) 40 mg tablet Take 40 mg by mouth nightly.  carbidopa-levodopa (Sinemet)  mg per tablet Take 1 Tablet by mouth nightly.       carbidopa-levodopa (Sinemet)  mg per tablet Take 1 Tablet by mouth three (3) times daily.  lamoTRIgine (LaMICtal) 200 mg tablet Take 200 mg by mouth two (2) times a day.  loratadine 10 mg cap Take 10 mg by mouth daily.  omeprazole (PRILOSEC) 40 mg capsule Take 40 mg by mouth daily.  timolol (TIMOPTIC) 0.5 % ophthalmic solution Administer 1 Drop to both eyes daily.  rivaroxaban (XARELTO) 2.5 mg tablet Take 2.5 mg by mouth two (2) times daily (with meals).  OLANZapine (ZyPREXA) 2.5 mg tablet Take 2.5 mg by mouth every six (6) hours as needed (psychosis or agitation).  ondansetron hcl (ZOFRAN) 4 mg tablet Take 4 mg by mouth every eight (8) hours as needed for Nausea or Nausea or Vomiting.  lisinopril (PRINIVIL, ZESTRIL) 5 mg tablet Take 1 Tab by mouth daily. 90 Tab 3    meclizine (ANTIVERT) 12.5 mg tablet Take 1/2 to 1 tablet twice daily as needed for vertigo (Patient taking differently: Take 12.5 mg by mouth two (2) times a day. Take 1/2 to 1 tablet twice daily as needed for vertigo) 60 Tab 3    fluticasone (FLONASE) 50 mcg/actuation nasal spray PLACE 2 SPRAYS IN BOTH NOSTRILS DAILY. 1 Bottle 3    propranoloL (INDERAL) 10 mg tablet Take 30 mg by mouth two (2) times a day.  latanoprost (XALATAN) 0.005 % ophthalmic solution Administer 1 Drop to both eyes nightly.               After treatment position/precautions:  · Upright in bed  · wife at bedside    Total Treatment Duration:   Time In: 1108  Time Out: [address] [facility], [place] Matt [address], CCC-SLP

## 2021-08-09 NOTE — DISCHARGE INSTR - DIET
Discharge Nutrition Plan: Continue Oral Nutrition Supplement (ONS) at discharge. Recommend Ensure Enlive or a comparable/similar product Three times daily for 30 days unless otherwise directed by your Primary Care Physician.

## 2021-08-09 NOTE — PROGRESS NOTES
Problem: Ventilator Management  Goal: *Adequate oxygenation and ventilation  Outcome: Progressing Towards Goal  Goal: *Patient maintains clear airway/free of aspiration  Outcome: Progressing Towards Goal  Goal: *Absence of infection signs and symptoms  Outcome: Progressing Towards Goal  Goal: *Normal spontaneous ventilation  Outcome: Progressing Towards Goal     Problem: Patient Education: Go to Patient Education Activity  Goal: Patient/Family Education  Outcome: Progressing Towards Goal     Problem: Pressure Injury - Risk of  Goal: *Prevention of pressure injury  Description: Document Chandler Scale and appropriate interventions in the flowsheet.   Outcome: Progressing Towards Goal  Note: Pressure Injury Interventions:  Sensory Interventions: Assess changes in LOC    Moisture Interventions: Absorbent underpads    Activity Interventions: Increase time out of bed    Mobility Interventions: Pressure redistribution bed/mattress (bed type), HOB 30 degrees or less    Nutrition Interventions: Document food/fluid/supplement intake    Friction and Shear Interventions: HOB 30 degrees or less, Minimize layers                Problem: Patient Education: Go to Patient Education Activity  Goal: Patient/Family Education  Outcome: Progressing Towards Goal     Problem: Delirium Treatment  Goal: *Level of consciousness restored to baseline  Outcome: Progressing Towards Goal  Goal: *Level of environmental perceptions restored to baseline  Outcome: Progressing Towards Goal  Goal: *Sensory perception restored to baseline  Outcome: Progressing Towards Goal  Goal: *Emotional stability restored to baseline  Outcome: Progressing Towards Goal  Goal: *Functional assessment restored to baseline  Outcome: Progressing Towards Goal  Goal: *Absence of falls  Outcome: Progressing Towards Goal  Goal: *Will remain free of delirium, CAM Score negative  Outcome: Progressing Towards Goal  Goal: *Cognitive status will be restored to baseline  Outcome: Progressing Towards Goal  Goal: Interventions  Outcome: Progressing Towards Goal     Problem: Patient Education: Go to Patient Education Activity  Goal: Patient/Family Education  Outcome: Progressing Towards Goal     Problem: Falls - Risk of  Goal: *Absence of Falls  Description: Document Ayo Verduzco Fall Risk and appropriate interventions in the flowsheet.   Outcome: Progressing Towards Goal  Note: Fall Risk Interventions:       Mentation Interventions: Adequate sleep, hydration, pain control    Medication Interventions: Bed/chair exit alarm    Elimination Interventions: Bed/chair exit alarm              Problem: Patient Education: Go to Patient Education Activity  Goal: Patient/Family Education  Outcome: Progressing Towards Goal     Problem: Non-Violent Restraints  Goal: Removal from restraints as soon as assessed to be safe  Outcome: Progressing Towards Goal  Goal: No harm/injury to patient while restraints in use  Outcome: Progressing Towards Goal  Goal: Patient's dignity will be maintained  Outcome: Progressing Towards Goal  Goal: Patient Interventions  Outcome: Progressing Towards Goal     Problem: Patient Education: Go to Patient Education Activity  Goal: Patient/Family Education  Outcome: Progressing Towards Goal     Problem: Patient Education: Go to Patient Education Activity  Goal: Patient/Family Education  Outcome: Progressing Towards Goal     Problem: Patient Education: Go to Patient Education Activity  Goal: Patient/Family Education  Outcome: Progressing Towards Goal

## 2021-08-09 NOTE — PROGRESS NOTES
Problem: Ventilator Management  Goal: *Adequate oxygenation and ventilation  8/9/2021 0156 by Hillary Chavira RN  Outcome: Progressing Towards Goal  8/9/2021 0155 by Hillary Chavira RN  Outcome: Progressing Towards Goal  Goal: *Patient maintains clear airway/free of aspiration  8/9/2021 0156 by Hillary Chavira RN  Outcome: Progressing Towards Goal  8/9/2021 0155 by Hillary Chavira RN  Outcome: Progressing Towards Goal  Goal: *Absence of infection signs and symptoms  8/9/2021 0156 by Hillary Chavira RN  Outcome: Progressing Towards Goal  8/9/2021 0155 by Hillary Chavira RN  Outcome: Progressing Towards Goal  Goal: *Normal spontaneous ventilation  8/9/2021 0156 by Hillary Chavira RN  Outcome: Progressing Towards Goal  8/9/2021 0155 by Hillary Chavira RN  Outcome: Progressing Towards Goal     Problem: Patient Education: Go to Patient Education Activity  Goal: Patient/Family Education  8/9/2021 0156 by Hillary Chavira RN  Outcome: Progressing Towards Goal  8/9/2021 0155 by Hillary Chavira RN  Outcome: Progressing Towards Goal     Problem: Pressure Injury - Risk of  Goal: *Prevention of pressure injury  Description: Document Chandler Scale and appropriate interventions in the flowsheet.   8/9/2021 0156 by Hillary Chavira RN  Outcome: Progressing Towards Goal  Note: Pressure Injury Interventions:  Sensory Interventions: Assess changes in LOC    Moisture Interventions: Absorbent underpads    Activity Interventions: Increase time out of bed    Mobility Interventions: Pressure redistribution bed/mattress (bed type), HOB 30 degrees or less    Nutrition Interventions: Document food/fluid/supplement intake    Friction and Shear Interventions: HOB 30 degrees or less, Minimize layers             8/9/2021 0155 by Hillary Chavira RN  Outcome: Progressing Towards Goal  Note: Pressure Injury Interventions:  Sensory Interventions: Assess changes in LOC    Moisture Interventions: Absorbent underpads    Activity Interventions: Increase time out of bed    Mobility Interventions: Pressure redistribution bed/mattress (bed type), HOB 30 degrees or less    Nutrition Interventions: Document food/fluid/supplement intake    Friction and Shear Interventions: HOB 30 degrees or less, Minimize layers                Problem: Patient Education: Go to Patient Education Activity  Goal: Patient/Family Education  8/9/2021 0156 by Valeriy Melgar, RN  Outcome: Progressing Towards Goal  8/9/2021 0155 by Valeriy Melgar, RN  Outcome: Progressing Towards Goal     Problem: Delirium Treatment  Goal: *Level of consciousness restored to baseline  8/9/2021 0156 by Valeriy Friday, RN  Outcome: Progressing Towards Goal  8/9/2021 0155 by Valeriy Friday, RN  Outcome: Progressing Towards Goal  Goal: *Level of environmental perceptions restored to baseline  8/9/2021 0156 by Valeriy Friday, RN  Outcome: Progressing Towards Goal  8/9/2021 0155 by Valeriy Friday, RN  Outcome: Progressing Towards Goal  Goal: *Sensory perception restored to baseline  8/9/2021 0156 by Valeriy Friday, RN  Outcome: Progressing Towards Goal  8/9/2021 0155 by Valeriy Friday, RN  Outcome: Progressing Towards Goal  Goal: *Emotional stability restored to baseline  8/9/2021 0156 by Valeriy Friday, RN  Outcome: Progressing Towards Goal  8/9/2021 0155 by Valeriy Friday, RN  Outcome: Progressing Towards Goal  Goal: *Functional assessment restored to baseline  8/9/2021 0156 by Valeriy Friday, RN  Outcome: Progressing Towards Goal  8/9/2021 0155 by Valeriy Friday, RN  Outcome: Progressing Towards Goal  Goal: *Absence of falls  8/9/2021 0156 by Valeriy Friday, RN  Outcome: Progressing Towards Goal  8/9/2021 0155 by Valeriy Friday, RN  Outcome: Progressing Towards Goal  Goal: *Will remain free of delirium, CAM Score negative  8/9/2021 0156 by Valeriy Friday, RN  Outcome: Progressing Towards Goal  8/9/2021 0155 by Valeriy Friday, RN  Outcome: Progressing Towards Goal  Goal: *Cognitive status will be restored to baseline  8/9/2021 0156 by Valeriy Friday, RN  Outcome: Progressing Towards Goal  8/9/2021 0155 by Valeriy Melgar RN  Outcome: Progressing Towards Goal  Goal: Interventions  8/9/2021 0156 by Valeriy Melgar RN  Outcome: Progressing Towards Goal  8/9/2021 0155 by Valeriy Melgar RN  Outcome: Progressing Towards Goal     Problem: Patient Education: Go to Patient Education Activity  Goal: Patient/Family Education  8/9/2021 0156 by Valeriy Melgar RN  Outcome: Progressing Towards Goal  8/9/2021 0155 by Valeriy Melgar RN  Outcome: Progressing Towards Goal     Problem: Falls - Risk of  Goal: *Absence of Falls  Description: Document [facility] Fall Risk and appropriate interventions in the flowsheet.   8/9/2021 0156 by Valeryi Melgar RN  Outcome: Progressing Towards Goal  Note: Fall Risk Interventions:       Mentation Interventions: Adequate sleep, hydration, pain control    Medication Interventions: Bed/chair exit alarm    Elimination Interventions: Bed/chair exit alarm           8/9/2021 0155 by Valeriy Melgar RN  Outcome: Progressing Towards Goal  Note: Fall Risk Interventions:       Mentation Interventions: Adequate sleep, hydration, pain control    Medication Interventions: Bed/chair exit alarm    Elimination Interventions: Bed/chair exit alarm              Problem: Patient Education: Go to Patient Education Activity  Goal: Patient/Family Education  8/9/2021 0156 by Valeriy Melgar RN  Outcome: Progressing Towards Goal  8/9/2021 0155 by Valeriy Melgar RN  Outcome: Progressing Towards Goal     Problem: Non-Violent Restraints  Goal: Removal from restraints as soon as assessed to be safe  8/9/2021 0156 by Valeriy Melgar RN  Outcome: Progressing Towards Goal  8/9/2021 0155 by Valeriy Melgar RN  Outcome: Progressing Towards Goal  Goal: No harm/injury to patient while restraints in use  8/9/2021 0156 by Valeriy Melgar RN  Outcome: Progressing Towards Goal  8/9/2021 0155 by Valeriy Melgar RN  Outcome: Progressing Towards Goal  Goal: Patient's dignity will be maintained  8/9/2021 0156 by Sunday Gomez RN  Outcome: Progressing Towards Goal  8/9/2021 0155 by Sunday Gomez RN  Outcome: Progressing Towards Goal  Goal: Patient Interventions  8/9/2021 0156 by Sunday Gomez RN  Outcome: Progressing Towards Goal  8/9/2021 0155 by Sunday Gomez RN  Outcome: Progressing Towards Goal     Problem: Patient Education: Go to Patient Education Activity  Goal: Patient/Family Education  8/9/2021 0156 by Sunday Gomez RN  Outcome: Progressing Towards Goal  8/9/2021 0155 by Sunday Gomez RN  Outcome: Progressing Towards Goal     Problem: Patient Education: Go to Patient Education Activity  Goal: Patient/Family Education  8/9/2021 0156 by Sunday Gomez RN  Outcome: Progressing Towards Goal  8/9/2021 0155 by Sunday Gomez RN  Outcome: Progressing Towards Goal     Problem: Patient Education: Go to Patient Education Activity  Goal: Patient/Family Education  8/9/2021 0156 by Sunday Gomez RN  Outcome: Progressing Towards Goal  8/9/2021 0155 by Sunday Gomez RN  Outcome: Progressing Towards Goal

## 2021-08-09 NOTE — PROGRESS NOTES
ACUTE PHYSICAL THERAPY GOALS:  (Developed with and agreed upon by patient and/or caregiver.)  (1.) Usha Segundo  will move from supine to sit and sit to supine , scoot up and down and roll side to side with CONTACT GUARD ASSIST within 7 treatment day(s). (2.) Usha Segundo will transfer from bed to chair and chair to bed with STAND BY ASSIST using the least restrictive device within 7 treatment day(s). (3.) Usha Segundo will ambulate with MINIMAL ASSIST for 50 feet with the least restrictive device within 7 treatment day(s). (4.) Usha Segundo will perform standing static and dynamic balance activities x 15 minutes with CONTACT GUARD ASSIST to improve safety within 7 treatment day(s). (5.) Usha Segundo will perform bilateral lower extremity exercises x 15 min for HEP with SUPERVISION to improve strength, endurance, and functional mobility within 7 treatment day(s). PHYSICAL THERAPY: Daily Note Treatment Day # 3    Usha Segundo is a 81 y.o. male   PRIMARY DIAGNOSIS: 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[facility] [address]  Respiratory failure with hypoxia ([facility] [address] [J96.91]     ASSESSMENT:     REHAB RECOMMENDATIONS: CURRENT LEVEL OF FUNCTION:  (Most Recently Demonstrated)   Recommendation to date pending progress:  Setting:   Short-term Rehab  Equipment:    To Be Determined Bed Mobility:   Minimal Assistance  Sit to Stand:   Minimal Assistance x 2  Transfers:   Minimal Assistance x 2  Gait/Mobility:   Minimal Assistance x 2     ASSESSMENT:  Mr. Solange Booker presents resting in bed, wife at bedside, quite agreeable to therapy. He is more alert and clear mentally today, states he is feeling well. Progressing well with mobility, Min A bed mobility, Min Ax1-2 for sit <> stand transfers, able to take side steps EOB and take steps to bedside chair. Less assist required with transfers, improved standing balance and tolerance today. Pt will continue to benefit from skilled PT services during his acute stay. Stated goals continue to be appropriate. SUBJECTIVE:   Mr. Moris Curiel states \"It feels good to sit up. \"    SOCIAL HISTORY/ LIVING ENVIRONMENT: From [facility] as of 3 days ago. Per chart, lives w/ wife. 1 story home. [address]. Uses RW.      OBJECTIVE:     PAIN: VITAL SIGNS: LINES/DRAINS:   Pre Treatment: Pain Screen  Pain Scale 1: Numeric (0 - 10)  Pain Intensity 1: 0  Post Treatment: 0/10 Vital Signs  O2 Device: None (Room air) none  O2 Device: None (Room air)     MOBILITY: I Mod I S SBA CGA Min Mod Max Total  NT x2 Comments:   Bed Mobility    Rolling [] [] [] [] [] [] [] [] [] [] [x]    Supine to Sit [] [] [] [] [] [x] [] [] [] [] []    Scooting [] [] [] [] [] [] [] [] [] [] [x]    Sit to Supine [] [] [] [] [] [] [] [] [] [] [x]    Transfers    Sit to Stand [] [] [] [] [] [x] [] [] [] [] [x]    Bed to Chair [] [] [] [] [] [x] [] [] [] [] [x]    Stand to Sit [] [] [] [] [] [x] [] [] [] [] [x]    I=Independent, Mod I=Modified Independent, S=Supervision, SBA=Standby Assistance, CGA=Contact Guard Assistance,   Min=Minimal Assistance, Mod=Moderate Assistance, Max=Maximal Assistance, Total=Total Assistance, NT=Not Tested    BALANCE: Good Fair+ Fair Fair- Poor NT Comments   Sitting Static [] [x] [] [] [] []    Sitting Dynamic [] [x] [] [] [] []              Standing Static [] [] [x] [] [] []    Standing Dynamic [] [] [x] [] [] []      GAIT: I Mod I S SBA CGA Min Mod Max Total  NT x2 Comments:   Level of Assistance [] [] [] [] [] [x] [] [] [] [] [x]    Distance 2 x 5 ft    DME Rolling Walker and Gait Belt    Gait Quality Short, shuffled steps    Weightbearing  Status N/A     I=Independent, Mod I=Modified Independent, S=Supervision, SBA=Standby Assistance, CGA=Contact Guard Assistance,   Min=Minimal Assistance, Mod=Moderate Assistance, Max=Maximal Assistance, Total=Total Assistance, NT=Not Tested    PLAN:   FREQUENCY/DURATION: PT Plan of Care: 3 times/week for duration of hospital stay or until stated goals are met, whichever comes first.  TREATMENT:     TREATMENT:   ($$ Therapeutic Activity: 23-37 mins    )  Therapeutic Activity (23 Minutes): Therapeutic activity included Rolling, Supine to Sit, Sit to Supine, Scooting, Transfer Training, Ambulation on level ground, Sitting balance  and Standing balance to improve functional Mobility, Strength and Activity tolerance.     TREATMENT GRID:  N/A    AFTER TREATMENT POSITION/PRECAUTIONS:  Alarm Activated, Bed, Needs within reach, RN notified and Visitors at bedside    INTERDISCIPLINARY COLLABORATION:  RN/PCT and PT/PTA    TOTAL TREATMENT DURATION:  PT Patient Time In/Time Out  Time In: 1119  Time Out: Radha 59, PT

## 2021-08-09 NOTE — PROGRESS NOTES
Problem: Ventilator Management  Goal: *Adequate oxygenation and ventilation  Outcome: Progressing Towards Goal  Goal: *Patient maintains clear airway/free of aspiration  Outcome: Progressing Towards Goal  Goal: *Absence of infection signs and symptoms  Outcome: Progressing Towards Goal  Goal: *Normal spontaneous ventilation  Outcome: Progressing Towards Goal     Problem: Pressure Injury - Risk of  Goal: *Prevention of pressure injury  Description: Document Chandler Scale and appropriate interventions in the flowsheet. Outcome: Progressing Towards Goal  Note: Pressure Injury Interventions:  Sensory Interventions: Assess changes in LOC    Moisture Interventions: Absorbent underpads    Activity Interventions: Increase time out of bed    Mobility Interventions: Pressure redistribution bed/mattress (bed type), PT/OT evaluation    Nutrition Interventions: Document food/fluid/supplement intake    Friction and Shear Interventions: HOB 30 degrees or less, Minimize layers                Problem: Delirium Treatment  Goal: *Level of consciousness restored to baseline  Outcome: Progressing Towards Goal  Goal: *Level of environmental perceptions restored to baseline  Outcome: Progressing Towards Goal  Goal: *Sensory perception restored to baseline  Outcome: Progressing Towards Goal  Goal: *Emotional stability restored to baseline  Outcome: Progressing Towards Goal  Goal: *Functional assessment restored to baseline  Outcome: Progressing Towards Goal  Goal: *Absence of falls  Outcome: Progressing Towards Goal  Goal: *Will remain free of delirium, CAM Score negative  Outcome: Progressing Towards Goal  Goal: *Cognitive status will be restored to baseline  Outcome: Progressing Towards Goal  Goal: Interventions  Outcome: Progressing Towards Goal     Problem: Falls - Risk of  Goal: *Absence of Falls  Description: Document Gio Fall Risk and appropriate interventions in the flowsheet.   Outcome: Progressing Towards Goal  Note: Fall Risk Interventions:       Mentation Interventions: Adequate sleep, hydration, pain control, Bed/chair exit alarm    Medication Interventions: Bed/chair exit alarm    Elimination Interventions: Bed/chair exit alarm              Problem: Non-Violent Restraints  Goal: Removal from restraints as soon as assessed to be safe  Outcome: Progressing Towards Goal  Goal: No harm/injury to patient while restraints in use  Outcome: Progressing Towards Goal  Goal: Patient's dignity will be maintained  Outcome: Progressing Towards Goal  Goal: Patient Interventions  Outcome: Progressing Towards Goal

## 2021-08-10 ENCOUNTER — APPOINTMENT (OUTPATIENT)
Dept: GENERAL RADIOLOGY | Age: 86
DRG: 056 | End: 2021-08-10
Attending: INTERNAL MEDICINE
Payer: MEDICARE

## 2021-08-10 VITALS
DIASTOLIC BLOOD PRESSURE: 89 MMHG | HEART RATE: 74 BPM | SYSTOLIC BLOOD PRESSURE: 136 MMHG | RESPIRATION RATE: 18 BRPM | TEMPERATURE: 98.1 F | WEIGHT: 126.8 LBS | BODY MASS INDEX: 18.15 KG/M2 | HEIGHT: 70 IN | OXYGEN SATURATION: 97 %

## 2021-08-10 LAB — CK SERPL-CCNC: 216 U/L (ref 21–215)

## 2021-08-10 PROCEDURE — 36415 COLL VENOUS BLD VENIPUNCTURE: CPT

## 2021-08-10 PROCEDURE — 74011250637 HC RX REV CODE- 250/637: Performed by: INTERNAL MEDICINE

## 2021-08-10 PROCEDURE — 74011250636 HC RX REV CODE- 250/636: Performed by: NURSE PRACTITIONER

## 2021-08-10 PROCEDURE — 74011000250 HC RX REV CODE- 250: Performed by: INTERNAL MEDICINE

## 2021-08-10 PROCEDURE — 92611 MOTION FLUOROSCOPY/SWALLOW: CPT

## 2021-08-10 PROCEDURE — 82550 ASSAY OF CK (CPK): CPT

## 2021-08-10 PROCEDURE — 74230 X-RAY XM SWLNG FUNCJ C+: CPT

## 2021-08-10 RX ORDER — POLYETHYLENE GLYCOL 3350 17 G/17G
17 POWDER, FOR SOLUTION ORAL DAILY
Qty: 1 EACH | Refills: 0 | Status: SHIPPED | OUTPATIENT
Start: 2021-08-10 | End: 2022-02-09

## 2021-08-10 RX ADMIN — BARIUM SULFATE 45 ML: 980 POWDER, FOR SUSPENSION ORAL at 10:40

## 2021-08-10 RX ADMIN — CARBIDOPA AND LEVODOPA 1 TABLET: 25; 100 TABLET ORAL at 09:05

## 2021-08-10 RX ADMIN — BARIUM SULFATE 15 ML: 400 PASTE ORAL at 10:39

## 2021-08-10 RX ADMIN — ENOXAPARIN SODIUM 40 MG: 40 INJECTION SUBCUTANEOUS at 09:05

## 2021-08-10 RX ADMIN — LISINOPRIL 5 MG: 5 TABLET ORAL at 09:05

## 2021-08-10 RX ADMIN — Medication 10 ML: at 06:04

## 2021-08-10 RX ADMIN — PANTOPRAZOLE SODIUM 40 MG: 40 TABLET, DELAYED RELEASE ORAL at 07:30

## 2021-08-10 NOTE — DISCHARGE SUMMARY
Hospitalist Discharge Summary   Admit Date:  2021 10:36 AM   Name:  Vania Jeans   Age:  81 y.o. Sex:  male  :  1935   MRN:  [id number]   PCP:  Leidy Johnson MD    Problem List for this Hospitalization:  Hospital Problems as of 8/10/2021 Date Reviewed: 2016        Codes Class Noted - Resolved POA    Required emergency intubation ICD-10-CM: Z98.890  ICD-9-CM: V72.83  2021 - Present Yes        Moderate protein-calorie malnutrition ([facility] ICD-10-CM: E44.0  ICD-9-CM: 263.0  2021 - Present Unknown        * (Principal) 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[facility] ICD-10-CM: Vena Edu  ICD-9-CM: 332.0  2021 - Present Yes        Tremor ICD-10-CM: R25.1  ICD-9-CM: 781.0  2013 - Present Yes        Weight loss ICD-10-CM: R63.4  ICD-9-CM: 783.21  2013 - Present Yes        Murmur ICD-10-CM: R01.1  ICD-9-CM: 785.2  2013 - Present         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 o. male with history of parkinson dementia, parkinson disease, peptic ulcer disease, anorexia who was admitted for acute metabolic encephalopathy.  He presented as a transfer from facility. [facility] had become acutely altered and was intubated. [facility] was briefly in the ICU from -.  On  he was extubated and stable for transfer to the floor where we resume care.  Of note he has a DNR order. [facility] remained obtunded and neurology is consulted.  Neurology is working under the diagnosis of rapidly progressive Parkinson disease with hyperpyrexia. He was treated with bromocriptine.  Initially requiring nutritional support by NGT but clinically improved and now tolerating a diet.      Remains weak and will need STR. Accepted to facility and medically ready to discharge today. Assessment/Plan:    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[address]  Altered mental status since transfer from facility. 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 Tapers off bromocriptine and remains improved. Continue sinemet        Tremor  -Carbidopa levodopa     Required emergency intubation  -s/p course of rocephin/azithro     PUD (peptic ulcer disease)  -pantoprazole for omeprazole     GERD (gastroesophageal reflux disease)  -Famotidine     Severe protein-calorie malnutrition (HCC)  BMI 16.2  -nutrition consult   -speech has cleared for diet  - tolerating modified diet and pills     Weight loss  -consulted by speech during hospital stay  - tolerating mechanical soft meals    Disposition: Rehab Facility  Diet: ADULT ORAL NUTRITION SUPPLEMENT Breakfast, Lunch, Dinner; Standard High Calorie/High Protein  ADULT DIET Easy to HungerTime  Code Status: DNR    Follow Up Orders: Follow-up Appointments   Procedures    FOLLOW UP VISIT Appointment in: Other [facility]) Neurology follow up scheduled with Dr. Chavo Sla at [facility] on 8/24/2021. Neurology follow up scheduled with Dr. Chavo Sal at [facility] on 8/24/2021. Standing Status:   Standing     Number of Occurrences:   1     Order Specific Question:   Appointment in     Answer:    Other (Specify)       Follow-up Information     Follow up With Specialties Details Why Ulises Mcconnell, [address] [address]  Kashif Lo5, Kita Llamas MD Internal Medicine   [address] [place] [address] 300  [address] [place] [address]  [id number] [phone]            Time spent in patient discharge and coordination 36 minutes. Plan was discussed with . All questions answered. Patient was stable at time of discharge. Instructions given to call a physician or return if any concerns. Discharge Info:   Current Discharge Medication List      START taking these medications    Details   polyethylene glycol (MIRALAX) 17 gram packet Take 1 Packet by mouth daily. Qty: 1 Each, Refills: 0  Start date: 8/10/2021         CONTINUE these medications which have NOT CHANGED    Details   atorvastatin (LIPITOR) 40 mg tablet Take 40 mg by mouth nightly. carbidopa-levodopa (Sinemet)  mg per tablet Take 1 Tablet by mouth nightly. carbidopa-levodopa (Sinemet)  mg per tablet Take 1 Tablet by mouth three (3) times daily. loratadine 10 mg cap Take 10 mg by mouth daily. omeprazole (PRILOSEC) 40 mg capsule Take 40 mg by mouth daily. timolol (TIMOPTIC) 0.5 % ophthalmic solution Administer 1 Drop to both eyes daily. rivaroxaban (XARELTO) 2.5 mg tablet Take 2.5 mg by mouth two (2) times daily (with meals). lisinopril (PRINIVIL, ZESTRIL) 5 mg tablet Take 1 Tab by mouth daily. Qty: 90 Tab, Refills: 3      fluticasone (FLONASE) 50 mcg/actuation nasal spray PLACE 2 SPRAYS IN BOTH NOSTRILS DAILY. Qty: 1 Bottle, Refills: 3      propranoloL (INDERAL) 10 mg tablet Take 30 mg by mouth two (2) times a day. latanoprost (XALATAN) 0.005 % ophthalmic solution Administer 1 Drop to both eyes nightly.       Associated Diagnoses: Glaucoma         STOP taking these medications       lamoTRIgine (LaMICtal) 200 mg tablet Comments:   Reason for Stopping:         OLANZapine (ZyPREXA) 2.5 mg tablet Comments:   Reason for Stopping:         ondansetron hcl (ZOFRAN) 4 mg tablet Comments:   Reason for Stopping:         meclizine (ANTIVERT) 12.5 mg tablet Comments:   Reason for Stopping:               Procedures done this admission:  * No surgery found *    Consults this admission:  IP CONSULT TO NEUROLOGY  IP CONSULT TO HOSPITALIST    Echocardiogram/EKG results:  No results found for this visit on 08/01/21. EKG Results     Procedure 720 Value Units Date/Time    EKG, 12 LEAD, INITIAL [[id number]] Collected: 08/02/21 1000    Order Status: Completed Updated: 08/02/21 1115     Ventricular Rate 83 BPM      Atrial Rate 83 BPM      P-R Interval 222 ms      QRS Duration 144 ms      Q-T Interval 412 ms      QTC Calculation (Bezet) 484 ms      Calculated P Axis 84 degrees      Calculated R Axis -73 degrees      Calculated T Axis 79 degrees      Diagnosis --     Ventricular-paced rhythm  Possible Left atrial enlargement  Right bundle branch block  Left anterior fascicular block  Possible Left ventricular hypertrophy  No previous ECGs available  Confirmed by Vadim Bowers ([id number]) on 8/2/2021 11:15:02 AM            Diagnostic Imaging/Tests:   XR CHEST SNGL V    Result Date: 8/3/2021   Portable view of the chest COMPARISON: Yesterday. CLINICAL HISTORY: Follow-up intubated. FINDINGS: Endotracheal tube, enteric tube and left-sided cardiac pacer are stable. Airspace densities are noted in the medial right lung base, similar to prior exam. Pleural-parenchymal scarring at the lung apices. No pulmonary edema. Cardiac mediastinal silhouette and the surrounding bones are stable. 1. Persistent increased opacity in the medial right lung base, suspicious for atelectasis or consolidation. 2. ET tube tip is in satisfactory position. XR ABD (KUB)    Result Date: 8/6/2021  Clinical history: NG tube placement TECHNIQUE: AP supine abdominal x-ray. FINDINGS: Enteric tube courses below the diaphragm, with the tip in the area of the mid stomach. Cholecystectomy clips and left-sided cardiac pacer are noted. 1. Tip of the enteric tube is in the mid stomach. XR ABD (KUB)    Result Date: 8/5/2021  PORTABLE ABDOMEN, August 5, 2021 at 1134 hours: CLINICAL HISTORY:  Endogastric tube placement. COMPARISON:  August 3, 2021. FINDINGS:  AP supine image demonstrates a the gastric tube with the proximal sidehole just below the gastroesophageal junction. No dilated bowel loops are evident in the upper abdomen. There are overlying radiopaque support devices. Nasogastric tube proximal sidehole is just below the gastroesophageal junction. Advancement by approximately 10 cm is suggested. XR ABD (KUB)    Result Date: 8/3/2021  Clinical history: NG tube placement verification. TECHNIQUE: AP supine abdominal x-ray. FINDINGS: Enteric tube courses below the diaphragm, with the tip in the mid stomach region. Bowel gas pattern appears within normal limits. Cholecystectomy clips are noted. Cardiac pacer wires are partially seen. 1. NG tube tip is in the stomach. XR ABD (KUB)    Result Date: 8/3/2021  Abdominal film for feeding GI tube placement. History: GI tube placement. Technique: Single AP view of the abdomen. NG tube tip is below the diaphragm. XR ABD (KUB)    Result Date: 8/1/2021  KUB Clinical location: Evaluate NG tube FINDINGS: Single supine view of the abdomen shows an NG tube in satisfactory position in the left upper abdominal quadrant. NG tube in satisfactory position. XR SWALLOW FUNC VIDEO    Result Date: 8/10/2021  MODIFIED BARIUM SWALLOW. HISTORY: Dysphagia. Feeding difficulties. TECHNIQUE: Fluoroscopic guidance was provided for the speech pathologist. Multiple consistencies of barium were given. FINDINGS: No acute tracheal aspiration. No laryngeal penetration. Swallowing mechanism was unremarkable. Please see speech pathologist report for further details. Fluoroscopy time: 1.5 minutes. Static images: None. Normal modified barium swallow. Please see speech pathologist report for further details.      CT HEAD WO CONT    Result Date: 8/1/2021  CT of the head without contrast. CLINICAL INDICATION: Altered mental status PROCEDURE: Serial thin section axial images are obtained from the cranial vertex through the skull base without the administration of intravenous contrast. Radiation dose reduction techniques were used for this study. Our CT scanners use one or all of the following: Automated exposure control, adjusted of the mA and/or kV according to patient size, iterative reconstruction COMPARISON: No prior. FINDINGS: There is no acute intracranial hemorrhage, mass, or mass effect. No abnormal extra-axial fluid collections identified. There is no hydrocephalus. The basilar cisterns are widely patent. Moderate bilateral cerebral atrophy. There is patchy white matter hypoattenuation noted bilaterally. No findings present to indicate large, cortical-based territorial infarction. No skull fracture or aggressive osseous lesion noted. The mastoid air cells and included paranasal sinuses are clear. 1. No acute intracranial abnormality. 2. Moderate cerebral atrophy with moderate patchy white matter hypoattenuation most in keeping with chronic microangiopathic disease. XR CHEST PORT    Result Date: 8/2/2021  Portable chest xray  COMPARISON: August 1, 2021 INDICATION: Intubated, follow-up FINDINGS: Endotracheal tube tip is at 4.5 cm above the tunde. Left-sided cardiac pacer is stable. Cardiomediastinal silhouette is within normal limits. There is increased density in the medial right lung base. There is no pneumothorax, pulmonary edema or large pleural effusion. There is pleural-parenchymal scarring at the lung apices. 1. Increase densities in the medial right lower lung, suspicious for atelectasis or consolidation. 2. ET tube tip is in good position. XR CHEST PORT    Result Date: 8/1/2021  Portable chest x-ray Clinical location: Altered mental status FINDINGS: Single AP view of the chest compared to a similar exam dated 3/29/2011 show the lungs to be well-expanded without focal airspace opacity. Scarring is noted at the bilateral lung apices. A left-sided pacer device is in place.  The ETT is in good position. The cardiac silhouette and mediastinum are unremarkable. There is no pneumothorax. Hyperexpanded lungs with scarring at the bilateral lung apices. DUPLEX UPPER EXT VENOUS LEFT    Result Date: 8/8/2021  LEFT UPPER EXTREMITY DOPPLER ULTRASOUND 8/8/2021 HISTORY: Mild left arm swelling yesterday. TECHNIQUE: Grayscale, color Doppler and pulse wave Doppler imaging of the deep veins of the left arm was performed from the neck to the elbow. COMPARISON: None FINDINGS: The right innominate and subclavian veins are patent. An IV is present in the left cephalic vein at the level of the distal humerus. Superficial thrombus is present in the left cephalic vein at the level of the mid forearm. There is no DVT in left arm. The left internal jugular vein, innominate vein, subclavian vein, axillary vein, basilic vein, brachial, radial and ulnar veins are patent. No DVT in the left arm. See above report for details.       All Micro Results     Procedure Component Value Units Date/Time    RESPIRATORY VIRUS PANEL W/COVID-19, PCR [[id number]] Collected: 08/09/21 1756    Order Status: Completed Specimen: Nasopharyngeal Updated: 08/09/21 2116     Adenovirus NOT DETECTED        Coronavirus 229E NOT DETECTED        Coronavirus HKU1 NOT DETECTED        Coronavirus CVNL63 NOT DETECTED        Coronavirus OC43 NOT DETECTED        SARS-CoV-2, PCR NOT DETECTED        Metapneumovirus NOT DETECTED        Rhinovirus and Enterovirus NOT DETECTED        Influenza A NOT DETECTED        Influenza B NOT DETECTED        Parainfluenza 1 NOT DETECTED        Parainfluenza 2 NOT DETECTED        Parainfluenza 3 NOT DETECTED        Parainfluenza virus 4 NOT DETECTED        RSV by PCR NOT DETECTED        B. parapertussis, PCR NOT DETECTED        Bordetella pertussis - PCR NOT DETECTED        Chlamydophila pneumoniae DNA, QL, PCR NOT DETECTED        Mycoplasma pneumoniae DNA, QL, PCR NOT DETECTED       CULTURE, BLOOD [[id number]] Collected: 08/01/21 1622    Order Status: Completed Specimen: Blood Updated: 08/06/21 0752     Special Requests: --        RIGHT  ARM       Culture result: NO GROWTH 5 DAYS       CULTURE, BLOOD [[id number]] Collected: 08/01/21 1519    Order Status: Completed Specimen: Blood Updated: 08/06/21 0752     Special Requests: --        RIGHT  Antecubital       Culture result: NO GROWTH 5 DAYS       COVID-19 RAPID TEST [[id number]] Collected: 08/01/21 1056    Order Status: Completed Specimen: Nasopharyngeal Updated: 08/01/21 1258     Specimen source NASAL        COVID-19 rapid test Not detected        Comment:      The specimen is NEGATIVE for SARS-CoV-2, the novel coronavirus associated with COVID-19. A negative result does not rule out COVID-19. This test has been authorized by the FDA under an Emergency Use Authorization (EUA) for use by authorized laboratories. Fact sheet for Healthcare Providers: iTendency.uy  Fact sheet for Patients: iTendency.uy       Methodology: Isothermal Nucleic Acid Amplification               Labs: Results:       BMP, Mg, Phos Recent Labs     08/09/21  0531 08/08/21  0610    141   K 3.5 3.6   * 107   CO2 28 30   AGAP 4* 4*   BUN 20 23   CREA 0.65* 0.71*   CA 8.3 8.6   * 119*   MG 2.3  --    PHOS 3.3  --       CBC Recent Labs     08/09/21  0531 08/08/21  0610   WBC 6.8 6.9   RBC 3.29* 3.15*   HGB 10.6* 10.2*   HCT 32.5* 30.8*    329   GRANS 77 81*   LYMPH 14 11*   EOS 1 1   MONOS 7 7   BASOS 0 0   IG 1 1   ANEU 5.2 5.6   ABL 0.9 0.8   JUNO 0.1 0.1   ABM 0.5 0.5   ABB 0.0 0.0   AIG 0.1 0.1      LFT No results for input(s): ALT, TBIL, AP, TP, ALB, GLOB, AGRAT in the last 72 hours.     No lab exists for component: SGOT, GPT   Cardiac Testing Lab Results   Component Value Date/Time     (H) 08/10/2021 06:27 AM     (H) 08/09/2021 05:31 AM     (H) 08/08/2021 06:10 AM      Coagulation Tests No results found for: PTP, INR, APTT, INREXT   A1c Lab Results   Component Value Date/Time    Hemoglobin A1c 5.6 02/08/2016 08:30 AM    Hemoglobin A1c 6.0 (H) 11/03/2015 08:32 AM    Hemoglobin A1c 5.9 (H) 06/22/2015 08:18 AM      Lipid Panel Lab Results   Component Value Date/Time    Cholesterol, total 106 05/09/2016 09:56 AM    HDL Cholesterol 40 (L) 05/09/2016 09:56 AM    LDL, calculated 58 05/09/2016 09:56 AM    VLDL, calculated 9 05/09/2016 09:56 AM    Triglyceride 44 05/09/2016 09:56 AM    CHOL/HDL Ratio 2.7 05/09/2016 09:56 AM      Thyroid Panel Lab Results   Component Value Date/Time    TSH 7.770 (H) 08/02/2021 03:05 AM    T4, Free 0.81 05/23/2016 10:12 AM        Most Recent UA Lab Results   Component Value Date/Time    Color CHRIS 08/01/2021 10:55 AM    Appearance CLEAR 08/01/2021 10:55 AM    pH (UA) 6.5 08/01/2021 10:55 AM    Protein Negative 08/01/2021 10:55 AM    Glucose Negative 08/01/2021 10:55 AM    Ketone 15 (A) 08/01/2021 10:55 AM    Bilirubin SMALL (A) 08/01/2021 10:55 AM    Blood Negative 08/01/2021 10:55 AM    Urobilinogen 1.0 08/01/2021 10:55 AM    Nitrites Negative 08/01/2021 10:55 AM    Leukocyte Esterase Negative 08/01/2021 10:55 AM          All Labs from Last 24 Hrs:  Recent Results (from the past 24 hour(s))   RESPIRATORY VIRUS PANEL W/COVID-19, PCR    Collection Time: 08/09/21  5:56 PM    Specimen: Nasopharyngeal   Result Value Ref Range    Adenovirus NOT DETECTED NOTDET      Coronavirus 229E NOT DETECTED NOTDET      Coronavirus HKU1 NOT DETECTED NOTDET      Coronavirus CVNL63 NOT DETECTED NOTDET      Coronavirus OC43 NOT DETECTED NOTDET      SARS-CoV-2, PCR NOT DETECTED NOTDET      Metapneumovirus NOT DETECTED NOTDET      Rhinovirus and Enterovirus NOT DETECTED NOTDET      Influenza A NOT DETECTED NOTDET      Influenza B NOT DETECTED NOTDET      Parainfluenza 1 NOT DETECTED NOTDET      Parainfluenza 2 NOT DETECTED NOTDET      Parainfluenza 3 NOT DETECTED NOTDET      Parainfluenza virus 4 NOT DETECTED NOTDET      RSV by PCR NOT DETECTED NOTDET      B. parapertussis, PCR NOT DETECTED NOTDET      Bordetella pertussis - PCR NOT DETECTED NOTDET      Chlamydophila pneumoniae DNA, QL, PCR NOT DETECTED NOTDET      Mycoplasma pneumoniae DNA, QL, PCR NOT DETECTED NOTDET     CK    Collection Time: 08/10/21  6:27 AM   Result Value Ref Range     (H) 21 - 215 U/L       Current Med List in Hospital:   Current Facility-Administered Medications   Medication Dose Route Frequency    barium sulfate (VARIBAR HONEY) 40 % (w/v) 29% (w/w) contrast suspension 15 mL  15 mL Oral RAD ONCE    barium sulfate (VARIBAR NECTAR) 40 % (w/v) contrast suspension 15 mL  15 mL Oral RAD ONCE    carbidopa-levodopa (SINEMET)  mg per tablet 1 Tablet  1 Tablet Oral TID    lisinopriL (PRINIVIL, ZESTRIL) tablet 5 mg  5 mg Oral DAILY    pantoprazole (PROTONIX) tablet 40 mg  40 mg Oral ACB    carbidopa-levodopa (SINEMET)  mg per tablet 1 Tablet  1 Tablet Per NG tube QHS    sodium chloride (NS) flush 5-40 mL  5-40 mL IntraVENous Q8H    sodium chloride (NS) flush 5-40 mL  5-40 mL IntraVENous PRN    acetaminophen (TYLENOL) tablet 650 mg  650 mg Oral Q6H PRN    Or    acetaminophen (TYLENOL) suppository 650 mg  650 mg Rectal Q6H PRN    polyethylene glycol (MIRALAX) packet 17 g  17 g Oral DAILY PRN    ondansetron (ZOFRAN ODT) tablet 4 mg  4 mg Oral Q8H PRN    Or    ondansetron (ZOFRAN) injection 4 mg  4 mg IntraVENous Q6H PRN    enoxaparin (LOVENOX) injection 40 mg  40 mg SubCUTAneous DAILY    NUTRITIONAL SUPPORT ELECTROLYTE PRN ORDERS   Does Not Apply PRN       Allergies   Allergen Reactions    Codeine Nausea and Vomiting    Gadolinium-Containing Contrast Media Nausea and Vomiting     Immunization History   Administered Date(s) Administered    COVID-19, PFIZER, MRNA, LNHARSH-S, PF, 30MCG/0.3ML DOSE 02/11/2021    Influenza High Dose Vaccine PF 09/01/2015    Influenza Vaccine 10/17/2013, 11/06/2014    Influenza Vaccine Split 10/11/2012    Pneumococcal Conjugate (PCV-13) 07/02/2015    Pneumococcal Polysaccharide (PPSV-23) 12/05/2013    TB Skin Test (PPD) Intradermal 01/18/2013, 08/04/2021    TDAP Vaccine 10/11/2012    Zoster Vaccine, Live 12/10/2014       Recent Vital Data:  Patient Vitals for the past 24 hrs:   Temp Pulse Resp BP SpO2   08/10/21 0653 98.1 °F (36.7 °C) 74 18 136/89 97 %   08/10/21 0450 98.1 °F (36.7 °C) 81 19 121/87 97 %   08/09/21 2244 97.4 °F (36.3 °C) 82 16 (!) 102/54 95 %   08/09/21 1927 97.6 °F (36.4 °C) 75 17 (!) 142/78 99 %   08/09/21 1557 98.3 °F (36.8 °C) 72 17 129/81 96 %     Oxygen Therapy  O2 Sat (%): 97 % (08/10/21 0653)  Pulse via Oximetry: 77 beats per minute (08/04/21 0931)  O2 Device: None (Room air) (08/09/21 1119)  Skin Assessment: Clean, dry, & intact (08/05/21 0707)  Skin Protection for O2 Device: No (08/04/21 1506)  Orientation: Bilateral (08/03/21 0701)  Location: Cheek (08/03/21 0701)  Interventions: Mouth Care (08/03/21 0701)  O2 Flow Rate (L/min): 2 l/min (08/05/21 0707)  FIO2 (%): 29 % (08/03/21 1001)    Estimated body mass index is 18.19 kg/m² as calculated from the following:    Height as of this encounter: 5' 10\" (1.778 m). Weight as of this encounter: 57.5 kg (126 lb 12.8 oz). Intake/Output Summary (Last 24 hours) at 8/10/2021 1243  Last data filed at 8/9/2021 1559  Gross per 24 hour   Intake    Output 400 ml   Net -400 ml         Physical Exam:    General:    Thin appearing. No overt distress  Head:  Normocephalic, atraumatic  Eyes:  Sclerae appear normal.  Pupils equally round. HENT:  Nares appear normal, no drainage. Moist mucous membranes  Neck:  No restricted ROM. Trachea midline  CV:   RRR. No m/r/g. No JVD  Lungs:   CTAB. No wheezing, rhonchi, or rales. Appears even, unlabored  Abdomen:   Soft, nontender, nondistended. Extremities: Warm and dry. No cyanosis or clubbing. No edema. Skin:     No rashes. Normal turgor.   Normal coloration  Neuro:  Cranial nerves II-XII grossly intact. Sensation intact mild pill rolling tremor  Psych:  Normal mood and affect. Signed:  Adan Salvador DO    Part of this note may have been written by using a voice dictation software. The note has been proof read but may still contain some grammatical/other typographical errors.

## 2021-08-10 NOTE — PROGRESS NOTES
Hourly rounds complete this shift. Patient alert and oriented x2. No new complaints at this time. Bed in low, locked position, call light and bedside table within reach. Patient in bed resting. Prescribed medications given. IV clean, dry, and intact. Will continue to monitor. Report given to oncoming nurse.

## 2021-08-10 NOTE — PROGRESS NOTES
Hospitalist Progress Note   Admit Date:  2021 10:36 AM   Name:  Delmar Phelan   Age:  81 y.o. Sex:  male  :  1935   MRN:  [id number]     Presenting Complaint: Unresponsive    Reason(s) for Admission: Respiratory failure with hypoxia [facility] Course & Interval History:     Delmar Phelan is a 81 y.o. male with history of parkinson dementia, parkinson disease, peptic ulcer disease, anorexia who was admitted for acute metabolic encephalopathy. He presented as a transfer from facility. He had become acutely altered and was intubated. He was briefly in the ICU from -. On  he was extubated and stable for transfer to the floor where we resume care. Of note he has a DNR order. He remained obtunded and neurology is consulted. Neurology is working under the diagnosis of rapidly progressive Parkinson disease with hyperpyrexia. He was treated with bromocriptine. INitially requiring nutritional support by NGT but clinically improved and now tolerating a diet. Remains weak and will need STR    Subjective (21): Much more alert today still. Recalls his visual hallucinations. Now oriented. Wife at bedside. Very sweet man. Assessment & Plan:   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[address]  Altered mental status since transfer from facility. 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 outpt     Tremor  -Carbidopa levodopa     Required emergency intubation  -s/p course of rocephin/azithro     PUD (peptic ulcer disease)  -pantoprazole for omeprazole     GERD (gastroesophageal reflux disease)  -Famotidine     Severe protein-calorie malnutrition (HCC)  BMI 16.2  -nutrition consult   -speech has cleared for diet  - tolerating modified diet and pills    Hypophosphetemia  - replete as per nutrition replacement guidelines.      Weight loss  -nutrition consult  -PT, OT, PPD, CM  -speech has cleared for diet on 8/6, NGT removed    Dispo/Discharge Planning:  Pending, prior to stay at [facility] was living with wife and using RW for ambulation. Will need STR at discharge. CM following.  Patient is MEDICALLY STABLE      Diet:  ADULT ORAL NUTRITION SUPPLEMENT Breakfast, Lunch, Dinner; Standard High Calorie/High Protein  ADULT DIET Dysphagia - Soft & Bite Sized  DVT PPx: lovenox    Code status: DNR    Active Hospital Problems    Diagnosis Date Noted    Required emergency intubation 08/04/2021     Priority: 4 - Four    Moderate protein-calorie malnutrition ([facility] 08/04/2021     Priority: 6 - Six    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[facility] 06/24/2021    Tremor 07/11/2013    Weight loss 01/18/2013    Monoclonal paraproteinemia 09/04/2012    GERD (gastroesophageal reflux disease) 09/04/2012    PUD (peptic ulcer disease) 09/04/2012       Objective:     Patient Vitals for the past 24 hrs:   Temp Pulse Resp BP SpO2   08/09/21 1927 97.6 °F (36.4 °C) 75 17 (!) 142/78 99 %   08/09/21 1557 98.3 °F (36.8 °C) 72 17 129/81 96 %   08/09/21 1120 97.6 °F (36.4 °C) 79 17 (!) 101/57 96 %   08/09/21 0809    (!) 145/71    08/09/21 0808    (!) 162/86    08/09/21 0800  74      08/09/21 0748 98.3 °F (36.8 °C) 78 17 (!) 180/79 96 %   08/09/21 0400  71      08/09/21 0355 98.3 °F (36.8 °C) 79 17 125/69 93 %   08/09/21 0031 97.8 °F (36.6 °C) 72 17 (!) 142/66 97 %   08/09/21 0000  74        Oxygen Therapy  O2 Sat (%): 99 % (08/09/21 1927)  Pulse via Oximetry: 77 beats per minute (08/04/21 0931)  O2 Device: None (Room air) (08/09/21 1119)  Skin Assessment: Clean, dry, & intact (08/05/21 0738)  Skin Protection for O2 Device: No (08/04/21 5934)  Orientation: Bilateral (08/03/21 0701)  Location: Cheek (08/03/21 0701)  Interventions: Mouth Care (08/03/21 0701)  O2 Flow Rate (L/min): 2 l/min (08/05/21 0707)  FIO2 (%): 29 % (08/03/21 1001)    Estimated body mass index is 18.19 kg/m² as calculated from the following:    Height as of this encounter: 5' 10\" (1.778 m). Weight as of this encounter: 57.5 kg (126 lb 12.2 oz). Intake/Output Summary (Last 24 hours) at 8/9/2021 2051  Last data filed at 8/9/2021 1559  Gross per 24 hour   Intake    Output 400 ml   Net -400 ml         Physical Exam:     General:    malnourished. No overt distress more alert  Head:  Normocephalic, atraumatic   Eyes:  Sclerae appear normal.  Pupils equally round. Sunken orbits  HENT:  Nares appear normal, no drainage. Moist mucous membranes  Neck:  No restricted ROM. Trachea midline  CV:   RRR. No m/r/g. No JVD  Lungs:   CTAB. No wheezing, rhonchi, or rales. Appears even, unlabored  Abdomen: Bowel sounds present. Soft, nontender, nondistended. Extremities: Warm and dry. No cyanosis or clubbing. No edema. Skin:     No rashes. Normal turgor. Normal coloration  Neuro:  Cranial nerves II-XII grossly intact.    Sensation intact  Psych:  Alert, oriented to place only    Data Ordered and Personally Reviewed:    Last 24hr Labs:  Recent Results (from the past 24 hour(s))   MAGNESIUM    Collection Time: 08/09/21  5:31 AM   Result Value Ref Range    Magnesium 2.3 1.8 - 2.4 mg/dL   PHOSPHORUS    Collection Time: 08/09/21  5:31 AM   Result Value Ref Range    Phosphorus 3.3 2.3 - 3.7 MG/DL   CK    Collection Time: 08/09/21  5:31 AM   Result Value Ref Range     (H) 21 - 215 U/L   CBC WITH AUTOMATED DIFF    Collection Time: 08/09/21  5:31 AM   Result Value Ref Range    WBC 6.8 4.3 - 11.1 K/uL    RBC 3.29 (L) 4.23 - 5.6 M/uL    HGB 10.6 (L) 13.6 - 17.2 g/dL    HCT 32.5 (L) 41.1 - 50.3 %    MCV 98.8 (H) 79.6 - 97.8 FL    MCH 32.2 26.1 - 32.9 PG    MCHC 32.6 31.4 - 35.0 g/dL    RDW 13.3 11.9 - 14.6 %    PLATELET 619 088 - 933 K/uL    MPV 9.5 9.4 - 12.3 FL    ABSOLUTE NRBC 0.00 0.0 - 0.2 K/uL    DF AUTOMATED NEUTROPHILS 77 43 - 78 %    LYMPHOCYTES 14 13 - 44 %    MONOCYTES 7 4.0 - 12.0 %    EOSINOPHILS 1 0.5 - 7.8 %    BASOPHILS 0 0.0 - 2.0 %    IMMATURE GRANULOCYTES 1 0.0 - 5.0 %    ABS. NEUTROPHILS 5.2 1.7 - 8.2 K/UL    ABS. LYMPHOCYTES 0.9 0.5 - 4.6 K/UL    ABS. MONOCYTES 0.5 0.1 - 1.3 K/UL    ABS. EOSINOPHILS 0.1 0.0 - 0.8 K/UL    ABS. BASOPHILS 0.0 0.0 - 0.2 K/UL    ABS. IMM. GRANS. 0.1 0.0 - 0.5 K/UL   METABOLIC PANEL, BASIC    Collection Time: 08/09/21  5:31 AM   Result Value Ref Range    Sodium 141 136 - 145 mmol/L    Potassium 3.5 3.5 - 5.1 mmol/L    Chloride 109 (H) 98 - 107 mmol/L    CO2 28 21 - 32 mmol/L    Anion gap 4 (L) 7 - 16 mmol/L    Glucose 105 (H) 65 - 100 mg/dL    BUN 20 8 - 23 MG/DL    Creatinine 0.65 (L) 0.8 - 1.5 MG/DL    GFR est AA >60 >60 ml/min/1.73m2    GFR est non-AA >60 >60 ml/min/1.73m2    Calcium 8.3 8.3 - 10.4 MG/DL       All Micro Results     Procedure Component Value Units Date/Time    RESPIRATORY VIRUS PANEL W/COVID-19, PCR [[id number]] Collected: 08/09/21 1756    Order Status: Completed Specimen: NASOPHARYNGEAL SWAB Updated: 08/09/21 1911    CULTURE, BLOOD [[id number]] Collected: 08/01/21 1622    Order Status: Completed Specimen: Blood Updated: 08/06/21 0752     Special Requests: --        RIGHT  ARM       Culture result: NO GROWTH 5 DAYS       CULTURE, BLOOD [[id number]] Collected: 08/01/21 1519    Order Status: Completed Specimen: Blood Updated: 08/06/21 0752     Special Requests: --        RIGHT  Antecubital       Culture result: NO GROWTH 5 DAYS       COVID-19 RAPID TEST [[id number]] Collected: 08/01/21 1056    Order Status: Completed Specimen: Nasopharyngeal Updated: 08/01/21 1258     Specimen source NASAL        COVID-19 rapid test Not detected        Comment:      The specimen is NEGATIVE for SARS-CoV-2, the novel coronavirus associated with COVID-19. A negative result does not rule out COVID-19.        This test has been authorized by the FDA under an Emergency Use Authorization (EUA) for use by authorized laboratories. Fact sheet for Healthcare Providers: ConventionUpdate.co.nz  Fact sheet for Patients: ConventionUpdate.co.nz       Methodology: Isothermal Nucleic Acid Amplification               Other Studies:  No results found. Current Meds:  Current Facility-Administered Medications   Medication Dose Route Frequency    carbidopa-levodopa (SINEMET)  mg per tablet 1 Tablet  1 Tablet Oral TID    lisinopriL (PRINIVIL, ZESTRIL) tablet 5 mg  5 mg Oral DAILY    pantoprazole (PROTONIX) tablet 40 mg  40 mg Oral ACB    carbidopa-levodopa (SINEMET)  mg per tablet 1 Tablet  1 Tablet Per NG tube QHS    sodium chloride (NS) flush 5-40 mL  5-40 mL IntraVENous Q8H    sodium chloride (NS) flush 5-40 mL  5-40 mL IntraVENous PRN    acetaminophen (TYLENOL) tablet 650 mg  650 mg Oral Q6H PRN    Or    acetaminophen (TYLENOL) suppository 650 mg  650 mg Rectal Q6H PRN    polyethylene glycol (MIRALAX) packet 17 g  17 g Oral DAILY PRN    ondansetron (ZOFRAN ODT) tablet 4 mg  4 mg Oral Q8H PRN    Or    ondansetron (ZOFRAN) injection 4 mg  4 mg IntraVENous Q6H PRN    enoxaparin (LOVENOX) injection 40 mg  40 mg SubCUTAneous DAILY    NUTRITIONAL SUPPORT ELECTROLYTE PRN ORDERS   Does Not Apply PRN       Signed:  Alina Bingham DO    Part of this note may have been written by using a voice dictation software. The note has been proof read but may still contain some grammatical/other typographical errors.

## 2021-08-10 NOTE — PROGRESS NOTES
Problem: Ventilator Management  Goal: *Adequate oxygenation and ventilation  Outcome: Progressing Towards Goal  Goal: *Patient maintains clear airway/free of aspiration  Outcome: Progressing Towards Goal  Goal: *Absence of infection signs and symptoms  Outcome: Progressing Towards Goal  Goal: *Normal spontaneous ventilation  Outcome: Progressing Towards Goal     Problem: Patient Education: Go to Patient Education Activity  Goal: Patient/Family Education  Outcome: Progressing Towards Goal     Problem: Pressure Injury - Risk of  Goal: *Prevention of pressure injury  Description: Document Chandler Scale and appropriate interventions in the flowsheet.   Outcome: Progressing Towards Goal  Note: Pressure Injury Interventions:  Sensory Interventions: Assess changes in LOC    Moisture Interventions: Absorbent underpads    Activity Interventions: Increase time out of bed    Mobility Interventions: Pressure redistribution bed/mattress (bed type), PT/OT evaluation    Nutrition Interventions: Document food/fluid/supplement intake    Friction and Shear Interventions: HOB 30 degrees or less, Minimize layers                Problem: Patient Education: Go to Patient Education Activity  Goal: Patient/Family Education  Outcome: Progressing Towards Goal     Problem: Delirium Treatment  Goal: *Level of consciousness restored to baseline  Outcome: Progressing Towards Goal  Goal: *Level of environmental perceptions restored to baseline  Outcome: Progressing Towards Goal  Goal: *Sensory perception restored to baseline  Outcome: Progressing Towards Goal  Goal: *Emotional stability restored to baseline  Outcome: Progressing Towards Goal  Goal: *Functional assessment restored to baseline  Outcome: Progressing Towards Goal  Goal: *Absence of falls  Outcome: Progressing Towards Goal  Goal: *Will remain free of delirium, CAM Score negative  Outcome: Progressing Towards Goal  Goal: *Cognitive status will be restored to baseline  Outcome: Progressing Towards Goal  Goal: Interventions  Outcome: Progressing Towards Goal     Problem: Patient Education: Go to Patient Education Activity  Goal: Patient/Family Education  Outcome: Progressing Towards Goal     Problem: Falls - Risk of  Goal: *Absence of Falls  Description: Document Amadou Hudson Fall Risk and appropriate interventions in the flowsheet.   Outcome: Progressing Towards Goal     Problem: Patient Education: Go to Patient Education Activity  Goal: Patient/Family Education  Outcome: Progressing Towards Goal     Problem: Non-Violent Restraints  Goal: Removal from restraints as soon as assessed to be safe  Outcome: Progressing Towards Goal  Goal: No harm/injury to patient while restraints in use  Outcome: Progressing Towards Goal  Goal: Patient's dignity will be maintained  Outcome: Progressing Towards Goal  Goal: Patient Interventions  Outcome: Progressing Towards Goal     Problem: Patient Education: Go to Patient Education Activity  Goal: Patient/Family Education  Outcome: Progressing Towards Goal     Problem: Patient Education: Go to Patient Education Activity  Goal: Patient/Family Education  Outcome: Progressing Towards Goal     Problem: Patient Education: Go to Patient Education Activity  Goal: Patient/Family Education  Outcome: Progressing Towards Goal

## 2021-08-10 NOTE — PROGRESS NOTES
Pt had bed offer at [facility] for today accepted by spouse.   Pt discharged to [address] [place] [address]

## 2021-08-10 NOTE — PROGRESS NOTES
LTG: Patient will tolerate least restrictive diet without overt signs or symptoms of airway compromise. STG: Patient will tolerate  easy to chew diet with thin liquids without overt signs or symptoms of airway compromise. [upgraded 8/7/21] Upgraded 8/10  STG: Patient will ongoing po trials to assess for diet advancement. Progressing 8/10  STG: Patient will complete lingual strengthening exercises to improve A-P bolus propulsion with 90% accuracy given min-mod cues. Added 8/10    SPEECH LANGUAGE PATHOLOGY: MODIFIED BARIUM SWALLOW STUDY  Initial Assessment    NAME/AGE/GENDER: Vira Niño is a 81 y.o. male  DATE: 8/10/2021  PRIMARY DIAGNOSIS: Respiratory failure with hypoxia ([facility] [address] [J96.91]      ICD-10: Treatment Diagnosis: Oral dysphagia (R13.11)  INTERDISCIPLINARY COLLABORATION: Radiologist, Dr. Pollard Channel  PRECAUTIONS/ALLERGIES: Codeine and Gadolinium-containing contrast media     RECOMMENDATIONS/PLAN   DIET:    Easy to Chew   Thin Liquids    MEDICATIONS: 1 at time with liquid rinse or floated in puree     COMPENSATORY STRATEGIES/MODIFICATIONS INCLUDING:  · Fully awake/alert  · Upright for all PO  · Alternate liquids/solids     OTHER RECOMMENDATIONS (including follow up treatment recommendations):   · Treatment to improve/facilitate oral/pharyngeal skills   · Training in use of compensatory safe swallowing strategies/feeding guidelines     RECOMMENDATIONS for CONTINUED SPEECH THERAPY:   YES: Anticipate need for ongoing speech therapy during this hospitalization and at next level of care. FREQUENCY/DURATION: Continue to follow patient 2 times a week for duration of hospital stay to address above goals. Next treatment session will address dysphagia exercises       ASSESSMENT   Mr. Ligia Cameron presents with mild oral dysphagia characterized by delayed bolus propulsion and tongue pumping with chewable trials.  Only transient shallow penetration occurred with serial sips of thin by straw, which is appropriate for age. No further aspiration/penetration with any consistencies. Min-mild vallecular residue post swallow with pudding and cracker, which effectively cleared with liquid rinse. Recommend upgrade to easy to chew diet/thin liquids. Meds 1 at a time with liquid rinse of floated in puree. Will continue to follow for diet tolerance and lingual strengthening exercises to improve oral phase of swallow. SUBJECTIVE   Patient alert upright in stretcher for assessment. Friendly and pleasant. Mild confusion. \"Are you sure you haven't come over to my house before? \"    History of Present Injury/Illness: Mr. Ilia Lombardi  has a past medical history of Anemia (7/11/2013), Anorexia (7/11/2013), Chest pain (1/18/2013), Coronary artery calcification seen on CAT scan (1/18/2013), Cough (1/18/2013), Cough (1/18/2013), Dyspepsia (1/18/2013), Dyspepsia (1/18/2013), GERD (gastroesophageal reflux disease) (9/4/2012), peptic ulcer (1/18/2013), Hyperglycemia (12/10/2014), Hyperglycemia (12/10/2014), Hyperlipidemia (7/11/2013), Hypertension (9/4/2012), Low back pain syndrome (9/4/2012), Monoclonal paraproteinemia (9/4/2012), Murmur (1/18/2013), Nonspecific (abnormal) findings on radiological and other examination of other intrathoracic organs (9/4/2012), Osteopenia (7/11/2013), Peptic ulcer, unspecified site, unspecified as acute or chronic, without mention of hemorrhage, perforation, or obstruction (9/4/2012), Pulmonary infiltrate (1/18/2013), Respiratory failure with hypoxia ([address] (8/1/2021), Tremor (7/11/2013), Vertigo (7/11/2013), Vitamin D deficiency (7/11/2013), and Weight loss (1/18/2013). Clement Fly He also  has a past surgical history that includes hx orthopaedic (rt shoulder); hx heent (1994); hx gastrectomy (1960); hx cholecystectomy (2000); hx hernia repair (1992); and hx back surgery (1990).    Pain:  Pain Intensity 1: 0  Pain Location 1: Rectal  Pain Intervention(s) 1: Nurse notified    Current dietary status prior to evaluation today:  Soft/bite sized and thin liquids    Previous Modified Barium Swallow studies: N/A    Current Medications:   No current facility-administered medications on file prior to encounter. Current Outpatient Medications on File Prior to Encounter   Medication Sig Dispense Refill    atorvastatin (LIPITOR) 40 mg tablet Take 40 mg by mouth nightly.  carbidopa-levodopa (Sinemet)  mg per tablet Take 1 Tablet by mouth nightly.  carbidopa-levodopa (Sinemet)  mg per tablet Take 1 Tablet by mouth three (3) times daily.  lamoTRIgine (LaMICtal) 200 mg tablet Take 200 mg by mouth two (2) times a day.  loratadine 10 mg cap Take 10 mg by mouth daily.  omeprazole (PRILOSEC) 40 mg capsule Take 40 mg by mouth daily.  timolol (TIMOPTIC) 0.5 % ophthalmic solution Administer 1 Drop to both eyes daily.  rivaroxaban (XARELTO) 2.5 mg tablet Take 2.5 mg by mouth two (2) times daily (with meals).  OLANZapine (ZyPREXA) 2.5 mg tablet Take 2.5 mg by mouth every six (6) hours as needed (psychosis or agitation).  ondansetron hcl (ZOFRAN) 4 mg tablet Take 4 mg by mouth every eight (8) hours as needed for Nausea or Nausea or Vomiting.  lisinopril (PRINIVIL, ZESTRIL) 5 mg tablet Take 1 Tab by mouth daily. 90 Tab 3    meclizine (ANTIVERT) 12.5 mg tablet Take 1/2 to 1 tablet twice daily as needed for vertigo (Patient taking differently: Take 12.5 mg by mouth two (2) times a day. Take 1/2 to 1 tablet twice daily as needed for vertigo) 60 Tab 3    fluticasone (FLONASE) 50 mcg/actuation nasal spray PLACE 2 SPRAYS IN BOTH NOSTRILS DAILY. 1 Bottle 3    propranoloL (INDERAL) 10 mg tablet Take 30 mg by mouth two (2) times a day.  latanoprost (XALATAN) 0.005 % ophthalmic solution Administer 1 Drop to both eyes nightly.            OBJECTIVE   Orientation:    Person   Place    Oral Assessment:  Labial: No impairment  Lingual: No impairment  Dentition: Intact  Oral Hygiene: Adequate  Vocal Quality: Low volume    Modified barium swallow study was performed in the radiology suite using c-arm with Mr. Олег Santa in the lateral plane upright 90° in a stretcher. To evaluate his swallow function, barium coated liquid and food was administered in the form of thin liquids (by spoon, cup sip, cup gulp, straw sip and serial swallows), pudding, mixed consistency and cracker. Oral phase of swallow:    prolonged mastication   slow oral transit   reduced posterior propulsion skills   lingual pumping    Pharyngeal phase of swallow:    Swallows of thin were triggered at the pyriforms. Only shallow transient penetration occurred with thin by serial straw sips. Otherwise, no aspiration/penetration.  Swallow of pudding and mixed fruit were triggered at the vallecula. No aspiration/penetration. Min vallecular reside post swallow   Swallows of cracker were triggered at BOT. No aspiration/penetration. Mild vallecular residue post swallow, which cleared with liquid rinse. Pharyngeal characteristics:   functional pharyngeal swallow  Attempted strategies:    none  Effective strategies:    none  Aspiration/Penetration Scale: 2 (Penetration/no residue. Contrast enters the larynx, remains above the folds/cords, and is cleared.)    Cervical esophageal phase of swallow:    adequate and timely clearance of all boluses through cervical esophagus  **Distal esophagus not assessed due to limitations of MBS study. Assessment/Reassessment only, no treatment provided today.       Tool Used: Dysphagia Outcome and Severity Scale (TUYET)    Comments   Normal Diet With no strategies or extra time needed   Functional Swallow May have mild oral or pharyngeal delay   Mild Dysphagia Which may require one diet consistency restricted    Mild-Moderate Dysphagia With 1-2 diet consistencies restricted   Moderate Dysphagia With 2 or more diet consistencies restricted   Moderately Severe Dysphagia With partial PO strategies (trials with ST only)   Severe Dysphagia With inability to tolerate any PO safely      Score:  Initial: 5 Most Recent: x (Date:8/10/2021)   Interpretation of Tool: The Dysphagia Outcome and Severity Scale (TUYET) is a simple, easy-to-use, 7-point scale developed to systematically rate the functional severity of dysphagia based on objective assessment and make recommendations for diet level, independence level, and type of nutrition. [place] / [place] / Product Type: Medicare /     Education:  · Recommendations discussed with patient. Safety:   After treatment position/precautions:  · Patient waiting in radiology holding bay for transport back to room.     Total Treatment Duration:  Time In: 1030   Time Out: [address], [address] [facility]

## 2021-08-17 NOTE — PROGRESS NOTES
Chart Open: As CM received VM from spouse Emperatriz Denney [phone], requesting neurologist patient was seen by, while inpatient. CM provided this information as requested via voicemail. CM remains available.

## 2022-02-09 ENCOUNTER — HOSPITAL ENCOUNTER (OUTPATIENT)
Dept: LAB | Age: 87
Discharge: HOME | End: 2022-02-09
Payer: MEDICARE

## 2022-02-09 DIAGNOSIS — E55.9 VITAMIN D DEFICIENCY: ICD-10-CM

## 2022-02-09 DIAGNOSIS — R41.0 EPISODE OF CONFUSION: ICD-10-CM

## 2022-02-09 PROBLEM — R63.6 UNDERWEIGHT: Status: ACTIVE | Noted: 2021-07-11

## 2022-02-09 PROBLEM — R73.03 PREDIABETES: Status: ACTIVE | Noted: 2018-01-19

## 2022-02-09 PROBLEM — Z95.0 PACEMAKER: Status: ACTIVE | Noted: 2019-03-04

## 2022-02-09 PROBLEM — R93.1 ABNORMAL ECHOCARDIOGRAM: Status: ACTIVE | Noted: 2018-01-14

## 2022-02-09 PROBLEM — T82.120A DISPLACEMENT OF ELECTRODE LEAD OF CARDIAC PACEMAKER: Status: ACTIVE | Noted: 2019-03-05

## 2022-02-09 PROBLEM — G40.209 NONINTRACTABLE EPILEPSY WITH COMPLEX PARTIAL SEIZURES (HCC): Status: ACTIVE | Noted: 2018-02-26

## 2022-02-09 PROBLEM — R47.01 APHASIA: Status: ACTIVE | Noted: 2018-01-12

## 2022-02-09 PROBLEM — I49.5 SINUS NODE DYSFUNCTION (HCC): Status: ACTIVE | Noted: 2019-03-12

## 2022-02-09 PROBLEM — E87.1 HYPONATREMIA: Status: ACTIVE | Noted: 2021-07-08
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
## 2022-02-09 PROBLEM — M54.2 NECK PAIN OF OVER 3 MONTHS DURATION: Status: ACTIVE | Noted: 2020-10-14

## 2022-02-09 PROBLEM — G81.90 HEMIPLEGIA (HCC): Status: ACTIVE | Noted: 2017-07-24

## 2022-02-09 PROBLEM — S51.011A SKIN TEAR OF RIGHT ELBOW WITHOUT COMPLICATION: Status: ACTIVE | Noted: 2021-07-08

## 2022-02-09 PROBLEM — I44.2 CHB (COMPLETE HEART BLOCK) (HCC): Status: ACTIVE | Noted: 2018-11-06

## 2022-02-09 PROBLEM — I25.10 ATHEROSCLEROSIS OF CORONARY ARTERY: Status: ACTIVE | Noted: 2022-02-09

## 2022-02-09 LAB
25(OH)D3 SERPL-MCNC: 11.8 NG/ML (ref 30–100)
ANION GAP SERPL CALC-SCNC: 4 MMOL/L (ref 7–16)
APPEARANCE UR: CLEAR
BACTERIA URNS QL MICRO: 0 /HPF
BASOPHILS # BLD: 0 K/UL (ref 0–0.2)
BASOPHILS NFR BLD: 0 % (ref 0–2)
BILIRUB UR QL: NEGATIVE
BUN SERPL-MCNC: 16 MG/DL (ref 8–23)
CALCIUM SERPL-MCNC: 8.8 MG/DL (ref 8.3–10.4)
CASTS URNS QL MICRO: ABNORMAL /LPF
CHLORIDE SERPL-SCNC: 102 MMOL/L (ref 98–107)
CO2 SERPL-SCNC: 30 MMOL/L (ref 21–32)
COLOR UR: ABNORMAL
CREAT SERPL-MCNC: 1 MG/DL (ref 0.8–1.5)
DIFFERENTIAL METHOD BLD: ABNORMAL
EOSINOPHIL # BLD: 0 K/UL (ref 0–0.8)
EOSINOPHIL NFR BLD: 0 % (ref 0.5–7.8)
EPI CELLS #/AREA URNS HPF: ABNORMAL /HPF
ERYTHROCYTE [DISTWIDTH] IN BLOOD BY AUTOMATED COUNT: 15.5 % (ref 11.9–14.6)
GLUCOSE SERPL-MCNC: 119 MG/DL (ref 65–100)
GLUCOSE UR STRIP.AUTO-MCNC: NEGATIVE MG/DL
HCT VFR BLD AUTO: 36.4 % (ref 41.1–50.3)
HGB BLD-MCNC: 11.6 G/DL (ref 13.6–17.2)
HGB UR QL STRIP: NEGATIVE
IMM GRANULOCYTES # BLD AUTO: 0 K/UL (ref 0–0.5)
IMM GRANULOCYTES NFR BLD AUTO: 0 % (ref 0–5)
KETONES UR QL STRIP.AUTO: ABNORMAL MG/DL
LEUKOCYTE ESTERASE UR QL STRIP.AUTO: NEGATIVE
LYMPHOCYTES # BLD: 1.3 K/UL (ref 0.5–4.6)
LYMPHOCYTES NFR BLD: 20 % (ref 13–44)
MCH RBC QN AUTO: 28.8 PG (ref 26.1–32.9)
MCHC RBC AUTO-ENTMCNC: 31.9 G/DL (ref 31.4–35)
MCV RBC AUTO: 90.3 FL (ref 79.6–97.8)
MONOCYTES # BLD: 0.4 K/UL (ref 0.1–1.3)
MONOCYTES NFR BLD: 6 % (ref 4–12)
NEUTS SEG # BLD: 4.7 K/UL (ref 1.7–8.2)
NEUTS SEG NFR BLD: 73 % (ref 43–78)
NITRITE UR QL STRIP.AUTO: NEGATIVE
NRBC # BLD: 0 K/UL (ref 0–0.2)
PH UR STRIP: 6 [PH] (ref 5–9)
PLATELET # BLD AUTO: 230 K/UL (ref 150–450)
PMV BLD AUTO: 10.4 FL (ref 9.4–12.3)
POTASSIUM SERPL-SCNC: 4.8 MMOL/L (ref 3.5–5.1)
PROT UR STRIP-MCNC: NEGATIVE MG/DL
RBC # BLD AUTO: 4.03 M/UL (ref 4.23–5.6)
RBC #/AREA URNS HPF: ABNORMAL /HPF
SODIUM SERPL-SCNC: 136 MMOL/L (ref 136–145)
SP GR UR REFRACTOMETRY: 1.02 (ref 1–1.02)
UROBILINOGEN UR QL STRIP.AUTO: 1 EU/DL (ref 0.2–1)
WBC # BLD AUTO: 6.5 K/UL (ref 4.3–11.1)
WBC URNS QL MICRO: ABNORMAL /HPF

## 2022-02-09 PROCEDURE — 80048 BASIC METABOLIC PNL TOTAL CA: CPT

## 2022-02-09 PROCEDURE — 81003 URINALYSIS AUTO W/O SCOPE: CPT

## 2022-02-09 PROCEDURE — 36415 COLL VENOUS BLD VENIPUNCTURE: CPT

## 2022-02-09 PROCEDURE — 87086 URINE CULTURE/COLONY COUNT: CPT

## 2022-02-09 PROCEDURE — 82306 VITAMIN D 25 HYDROXY: CPT

## 2022-02-09 PROCEDURE — 85025 COMPLETE CBC W/AUTO DIFF WBC: CPT

## 2022-02-10 NOTE — PROGRESS NOTES
Spoke with spouse. Vitamin d level is low, will start on Vitamin D supplement 6,000 units daily for 8 weeks. Na level is normal.UA is normal. Hgb has improved.

## 2022-02-12 LAB
BACTERIA SPEC CULT: NORMAL
SERVICE CMNT-IMP: NORMAL

## 2022-03-18 PROBLEM — I44.2 CHB (COMPLETE HEART BLOCK) (HCC): Status: ACTIVE | Noted: 2018-11-06

## 2022-03-18 PROBLEM — M54.2 NECK PAIN OF OVER 3 MONTHS DURATION: Status: ACTIVE | Noted: 2020-10-14

## 2022-03-18 PROBLEM — R73.03 PREDIABETES: Status: ACTIVE | Noted: 2018-01-19

## 2022-03-18 PROBLEM — R47.01 APHASIA: Status: ACTIVE | Noted: 2018-01-12

## 2022-03-19 PROBLEM — R63.6 UNDERWEIGHT: Status: ACTIVE | Noted: 2021-07-11

## 2022-03-19 PROBLEM — E44.0 MODERATE PROTEIN-CALORIE MALNUTRITION (HCC): Status: ACTIVE | Noted: 2021-08-04

## 2022-03-19 PROBLEM — G81.90 HEMIPLEGIA (HCC): Status: ACTIVE | Noted: 2017-07-24

## 2022-03-19 PROBLEM — I25.10 ATHEROSCLEROSIS OF CORONARY ARTERY: Status: ACTIVE | Noted: 2022-02-09

## 2022-03-19 PROBLEM — G20.C ATYPICAL PARKINSONISM: Status: ACTIVE | Noted: 2021-06-24

## 2022-03-19 PROBLEM — G20 ATYPICAL PARKINSONISM (HCC): Status: ACTIVE | Noted: 2021-06-24
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
## 2022-03-19 PROBLEM — Z86.73 HISTORY OF TIA (TRANSIENT ISCHEMIC ATTACK): Status: ACTIVE | Noted: 2017-08-28

## 2022-03-19 PROBLEM — Z98.890 REQUIRED EMERGENCY INTUBATION: Status: ACTIVE | Noted: 2021-08-04

## 2022-03-19 PROBLEM — S51.011A SKIN TEAR OF RIGHT ELBOW WITHOUT COMPLICATION: Status: ACTIVE | Noted: 2021-07-08

## 2022-03-19 PROBLEM — E87.1 HYPONATREMIA: Status: ACTIVE | Noted: 2021-07-08

## 2022-03-19 PROBLEM — G25.0 BENIGN ESSENTIAL TREMOR: Status: ACTIVE | Noted: 2018-01-30

## 2022-03-19 PROBLEM — T82.120A DISPLACEMENT OF ELECTRODE LEAD OF CARDIAC PACEMAKER: Status: ACTIVE | Noted: 2019-03-05

## 2022-03-19 PROBLEM — R93.1 ABNORMAL ECHOCARDIOGRAM: Status: ACTIVE | Noted: 2018-01-14

## 2022-03-19 PROBLEM — Z95.0 PACEMAKER: Status: ACTIVE | Noted: 2019-03-04

## 2022-03-20 PROBLEM — G40.209 NONINTRACTABLE EPILEPSY WITH COMPLEX PARTIAL SEIZURES (HCC): Status: ACTIVE | Noted: 2018-02-26

## 2022-03-20 PROBLEM — I49.5 SINUS NODE DYSFUNCTION (HCC): Status: ACTIVE | Noted: 2019-03-12

## 2022-06-09 RX ORDER — ACETAMINOPHEN 160 MG
TABLET,DISINTEGRATING ORAL
Qty: 90 CAPSULE | Refills: 1 | OUTPATIENT
Start: 2022-06-09

## 2022-11-24 NOTE — PROGRESS NOTES
Pt removed NG tube reported to Dr. Evander Siemens, new orders received to discontinue tube feeding. Reported to Dietician. warm and dry/color normal/no rashes

## 2024-01-02 ENCOUNTER — OFFICE VISIT (OUTPATIENT)
Dept: NEUROLOGY | Age: 89
End: 2024-01-02

## 2024-01-02 VITALS
BODY MASS INDEX: 16.46 KG/M2 | HEART RATE: 88 BPM | SYSTOLIC BLOOD PRESSURE: 147 MMHG | HEIGHT: 70 IN | DIASTOLIC BLOOD PRESSURE: 88 MMHG | WEIGHT: 115 LBS

## 2024-01-02 DIAGNOSIS — R26.9 GAIT DISTURBANCE: ICD-10-CM

## 2024-01-02 DIAGNOSIS — R47.01 EXPRESSIVE APHASIA: ICD-10-CM

## 2024-01-02 DIAGNOSIS — G20.C PRIMARY PARKINSONISM: Primary | ICD-10-CM

## 2024-01-02 DIAGNOSIS — Z86.73 HISTORY OF ISCHEMIC STROKE: ICD-10-CM

## 2024-01-02 DIAGNOSIS — G81.11 RIGHT SPASTIC HEMIPARESIS (HCC): ICD-10-CM

## 2024-01-02 PROCEDURE — 99215 OFFICE O/P EST HI 40 MIN: CPT | Performed by: PSYCHIATRY & NEUROLOGY

## 2024-01-02 PROCEDURE — 1123F ACP DISCUSS/DSCN MKR DOCD: CPT | Performed by: PSYCHIATRY & NEUROLOGY

## 2024-01-02 ASSESSMENT — ENCOUNTER SYMPTOMS
ABDOMINAL PAIN: 0
COUGH: 0
TROUBLE SWALLOWING: 0

## 2024-01-02 NOTE — PROGRESS NOTES
needed.       Signature: Manish Orantes MD      Date:  1/3/2024    [address], [place] [address]  Ph: [phone]  Fax: [phone]         I have personally interviewed and examined Mr. Baljit Macias and I have personally reviewed all relevant records including labs and imaging as noted above. I have written all aspects of this note. More than 50% of this time was used for counseling regarding my diagnosis, prognosis, and plans for management. Total visit time: 50 minutes.